# Patient Record
Sex: FEMALE | Race: WHITE | Employment: FULL TIME | URBAN - METROPOLITAN AREA
[De-identification: names, ages, dates, MRNs, and addresses within clinical notes are randomized per-mention and may not be internally consistent; named-entity substitution may affect disease eponyms.]

---

## 2017-12-04 RX ORDER — ASPIRIN 81 MG/1
81 TABLET ORAL EVERY EVENING
COMMUNITY

## 2017-12-04 NOTE — PROGRESS NOTES
Patient pre-assessment complete for [facility] poss with DR Thomason scheduled for 17 at 9:30am, arrival time 7:30am. Patient verified using . Patient instructed to bring all home medications in labeled bottles on the day of procedure. NPO status reinforced. Patient informed to take a full dose aspirin 325mg  or 81 mg x 4 on the day of procedure. Instructed they can take all other medications excluding vitamins & supplements. Patient verbalizes understanding of all instructions & denies any questions at this time.

## 2017-12-05 ENCOUNTER — HOSPITAL ENCOUNTER (OUTPATIENT)
Dept: CARDIAC CATH/INVASIVE PROCEDURES | Age: 47
Discharge: HOME | End: 2017-12-05
Attending: INTERNAL MEDICINE | Admitting: INTERNAL MEDICINE
Payer: COMMERCIAL

## 2017-12-05 VITALS
RESPIRATION RATE: 16 BRPM | DIASTOLIC BLOOD PRESSURE: 58 MMHG | TEMPERATURE: 98.4 F | HEART RATE: 88 BPM | OXYGEN SATURATION: 94 % | BODY MASS INDEX: 32.18 KG/M2 | HEIGHT: 67 IN | WEIGHT: 205 LBS | SYSTOLIC BLOOD PRESSURE: 105 MMHG

## 2017-12-05 LAB
ALBUMIN SERPL-MCNC: 3.4 G/DL (ref 3.5–5)
ALBUMIN/GLOB SERPL: 0.8 {RATIO} (ref 1.2–3.5)
ALP SERPL-CCNC: 91 U/L (ref 50–136)
ALT SERPL-CCNC: 37 U/L (ref 12–65)
ANION GAP SERPL CALC-SCNC: 12 MMOL/L (ref 7–16)
AST SERPL-CCNC: 22 U/L (ref 15–37)
ATRIAL RATE: 80 BPM
BILIRUB SERPL-MCNC: 0.3 MG/DL (ref 0.2–1.1)
BUN SERPL-MCNC: 22 MG/DL (ref 6–23)
CALCIUM SERPL-MCNC: 8.8 MG/DL (ref 8.3–10.4)
CALCULATED P AXIS, ECG09: 12 DEGREES
CALCULATED R AXIS, ECG10: 1 DEGREES
CALCULATED T AXIS, ECG11: 27 DEGREES
CHLORIDE SERPL-SCNC: 99 MMOL/L (ref 98–107)
CO2 SERPL-SCNC: 29 MMOL/L (ref 21–32)
CREAT SERPL-MCNC: 0.72 MG/DL (ref 0.6–1)
DIAGNOSIS, 93000: NORMAL
ERYTHROCYTE [DISTWIDTH] IN BLOOD BY AUTOMATED COUNT: 13.1 % (ref 11.9–14.6)
GLOBULIN SER CALC-MCNC: 4.2 G/DL (ref 2.3–3.5)
GLUCOSE SERPL-MCNC: 129 MG/DL (ref 65–100)
HCT VFR BLD AUTO: 44.8 % (ref 35.8–46.3)
HGB BLD-MCNC: 15.4 G/DL (ref 11.7–15.4)
INR PPP: 1 (ref 0.9–1.2)
MCH RBC QN AUTO: 29.5 PG (ref 26.1–32.9)
MCHC RBC AUTO-ENTMCNC: 34.4 G/DL (ref 31.4–35)
MCV RBC AUTO: 85.8 FL (ref 79.6–97.8)
P-R INTERVAL, ECG05: 136 MS
PLATELET # BLD AUTO: 249 K/UL (ref 150–450)
PMV BLD AUTO: 11.9 FL (ref 10.8–14.1)
POTASSIUM SERPL-SCNC: 3.4 MMOL/L (ref 3.5–5.1)
PROT SERPL-MCNC: 7.6 G/DL (ref 6.3–8.2)
PROTHROMBIN TIME: 10.9 SEC (ref 9.6–12)
Q-T INTERVAL, ECG07: 392 MS
QRS DURATION, ECG06: 100 MS
QTC CALCULATION (BEZET), ECG08: 452 MS
RBC # BLD AUTO: 5.22 M/UL (ref 4.05–5.25)
SODIUM SERPL-SCNC: 140 MMOL/L (ref 136–145)
VENTRICULAR RATE, ECG03: 80 BPM
WBC # BLD AUTO: 12.3 K/UL (ref 4.3–11.1)

## 2017-12-05 PROCEDURE — 99153 MOD SED SAME PHYS/QHP EA: CPT

## 2017-12-05 PROCEDURE — 85610 PROTHROMBIN TIME: CPT | Performed by: INTERNAL MEDICINE

## 2017-12-05 PROCEDURE — 93458 L HRT ARTERY/VENTRICLE ANGIO: CPT

## 2017-12-05 PROCEDURE — 74011250636 HC RX REV CODE- 250/636: Performed by: INTERNAL MEDICINE

## 2017-12-05 PROCEDURE — C1769 GUIDE WIRE: HCPCS

## 2017-12-05 PROCEDURE — 92920 PRQ TRLUML C ANGIOP 1ART&/BR: CPT

## 2017-12-05 PROCEDURE — 77030012468 HC VLV BLEEDBK CNTRL ABBT -B

## 2017-12-05 PROCEDURE — C1725 CATH, TRANSLUMIN NON-LASER: HCPCS

## 2017-12-05 PROCEDURE — 80053 COMPREHEN METABOLIC PANEL: CPT | Performed by: INTERNAL MEDICINE

## 2017-12-05 PROCEDURE — 77030002888 HC SUT CHRMC J&J -A

## 2017-12-05 PROCEDURE — 74011250637 HC RX REV CODE- 250/637: Performed by: INTERNAL MEDICINE

## 2017-12-05 PROCEDURE — 77030004534 HC CATH ANGI DX INFN CARD -A

## 2017-12-05 PROCEDURE — 74011250636 HC RX REV CODE- 250/636

## 2017-12-05 PROCEDURE — C1894 INTRO/SHEATH, NON-LASER: HCPCS

## 2017-12-05 PROCEDURE — 99152 MOD SED SAME PHYS/QHP 5/>YRS: CPT

## 2017-12-05 PROCEDURE — 74011636320 HC RX REV CODE- 636/320: Performed by: INTERNAL MEDICINE

## 2017-12-05 PROCEDURE — 82810 BLOOD GASES O2 SAT ONLY: CPT

## 2017-12-05 PROCEDURE — C1887 CATHETER, GUIDING: HCPCS

## 2017-12-05 PROCEDURE — 74011000258 HC RX REV CODE- 258: Performed by: INTERNAL MEDICINE

## 2017-12-05 PROCEDURE — 77030013794 HC KT TRNSDUC BLD EDWD -B

## 2017-12-05 PROCEDURE — 85027 COMPLETE CBC AUTOMATED: CPT | Performed by: INTERNAL MEDICINE

## 2017-12-05 PROCEDURE — 74011000250 HC RX REV CODE- 250: Performed by: INTERNAL MEDICINE

## 2017-12-05 PROCEDURE — 93005 ELECTROCARDIOGRAM TRACING: CPT | Performed by: INTERNAL MEDICINE

## 2017-12-05 RX ORDER — FENTANYL CITRATE 50 UG/ML
25-50 INJECTION, SOLUTION INTRAMUSCULAR; INTRAVENOUS
Status: DISCONTINUED | OUTPATIENT
Start: 2017-12-05 | End: 2017-12-05 | Stop reason: HOSPADM

## 2017-12-05 RX ORDER — HEPARIN SODIUM 200 [USP'U]/100ML
3 INJECTION, SOLUTION INTRAVENOUS CONTINUOUS
Status: DISCONTINUED | OUTPATIENT
Start: 2017-12-05 | End: 2017-12-05 | Stop reason: HOSPADM

## 2017-12-05 RX ORDER — ONDANSETRON 2 MG/ML
4 INJECTION INTRAMUSCULAR; INTRAVENOUS AS NEEDED
Status: DISCONTINUED | OUTPATIENT
Start: 2017-12-05 | End: 2017-12-05 | Stop reason: HOSPADM

## 2017-12-05 RX ORDER — DIAZEPAM 5 MG/1
5 TABLET ORAL ONCE
Status: COMPLETED | OUTPATIENT
Start: 2017-12-05 | End: 2017-12-05

## 2017-12-05 RX ORDER — PRAVASTATIN SODIUM 40 MG/1
40 TABLET ORAL
Qty: 30 TAB | Refills: 11 | Status: SHIPPED | OUTPATIENT
Start: 2017-12-05 | End: 2018-01-15 | Stop reason: SDUPTHER

## 2017-12-05 RX ORDER — SODIUM CHLORIDE 9 MG/ML
75 INJECTION, SOLUTION INTRAVENOUS CONTINUOUS
Status: DISCONTINUED | OUTPATIENT
Start: 2017-12-05 | End: 2017-12-05 | Stop reason: HOSPADM

## 2017-12-05 RX ORDER — MIDAZOLAM HYDROCHLORIDE 1 MG/ML
.5-2 INJECTION, SOLUTION INTRAMUSCULAR; INTRAVENOUS
Status: DISCONTINUED | OUTPATIENT
Start: 2017-12-05 | End: 2017-12-05 | Stop reason: HOSPADM

## 2017-12-05 RX ORDER — GUAIFENESIN 100 MG/5ML
81-324 LIQUID (ML) ORAL ONCE
Status: DISCONTINUED | OUTPATIENT
Start: 2017-12-05 | End: 2017-12-05 | Stop reason: HOSPADM

## 2017-12-05 RX ORDER — LIDOCAINE HYDROCHLORIDE 20 MG/ML
60 INJECTION, SOLUTION INFILTRATION; PERINEURAL ONCE
Status: COMPLETED | OUTPATIENT
Start: 2017-12-05 | End: 2017-12-05

## 2017-12-05 RX ADMIN — LIDOCAINE HYDROCHLORIDE 60 MG: 20 INJECTION, SOLUTION INFILTRATION; PERINEURAL at 10:20

## 2017-12-05 RX ADMIN — SODIUM CHLORIDE 75 ML/HR: 900 INJECTION, SOLUTION INTRAVENOUS at 08:33

## 2017-12-05 RX ADMIN — FENTANYL CITRATE 50 MCG: 50 INJECTION, SOLUTION INTRAMUSCULAR; INTRAVENOUS at 10:02

## 2017-12-05 RX ADMIN — FENTANYL CITRATE 50 MCG: 50 INJECTION, SOLUTION INTRAMUSCULAR; INTRAVENOUS at 10:32

## 2017-12-05 RX ADMIN — DIAZEPAM 5 MG: 5 TABLET ORAL at 08:33

## 2017-12-05 RX ADMIN — MIDAZOLAM HYDROCHLORIDE 2 MG: 1 INJECTION, SOLUTION INTRAMUSCULAR; INTRAVENOUS at 10:02

## 2017-12-05 RX ADMIN — MIDAZOLAM HYDROCHLORIDE 2 MG: 1 INJECTION, SOLUTION INTRAMUSCULAR; INTRAVENOUS at 10:31

## 2017-12-05 RX ADMIN — ONDANSETRON 4 MG: 2 INJECTION INTRAMUSCULAR; INTRAVENOUS at 09:08

## 2017-12-05 RX ADMIN — BIVALIRUDIN 1.75 MG/KG/HR: 250 INJECTION, POWDER, LYOPHILIZED, FOR SOLUTION INTRAVENOUS at 10:30

## 2017-12-05 RX ADMIN — HEPARIN SODIUM 3 ML/HR: 200 INJECTION, SOLUTION INTRAVENOUS at 10:15

## 2017-12-05 RX ADMIN — IOPAMIDOL 140 ML: 755 INJECTION, SOLUTION INTRAVENOUS at 10:51

## 2017-12-05 NOTE — PROCEDURES
lv fxn ok  Mild prox lad disease continued distal lad occlusion   circ ok  rca 30-40% pda lesion  Unable to cross  of lad

## 2017-12-05 NOTE — IP AVS SNAPSHOT
Anabelle Choi 
 
 
 [address] [facility] [address] [facility] 
[phone] Patient: Lamine Badillo MRN: [id number] AMA:[birth year] Discharge Summary 12/5/2017 Lamine Badillo MRN[de-identified]  [id number] Admission Information Provider Pager Service Admission Date Expected D/C Date Rio Ibarra MD  CARDIAC CATH LAB 12/5/2017 12/5/2017 Actual LOS Patient Class 0 days OUTPATIENT Follow-up Information Follow up With Details Comments Contact Info Omari Harding MD   [address] [facility] [address] [facility] [address] 
[phone] Rio Ibarra MD On 12/19/2017 Follow up on December 19th at 3:15pm [facility])  [address] [facility] [address] 
[phone] My Medications TAKE these medications as instructed Instructions Each Dose to Equal  
 Morning Noon Evening Bedtime  
 albuterol 2.5 mg /3 mL (0.083 %) nebulizer solution Commonly known as:  PROVENTIL VENTOLIN Your last dose was: Your next dose is:    
   
   
 1 vial via nebulizer qid  
     
   
   
   
  
 aspirin delayed-release 81 mg tablet Your last dose was: Your next dose is: Take 81 mg by mouth every evening. 81 mg  
    
   
   
   
  
 ATIVAN 0.5 mg tablet Generic drug:  LORazepam  
   
Your last dose was: Your next dose is: Take 0.5 mg by mouth every four (4) hours as needed for Anxiety. 0.5 mg  
    
   
   
   
  
 chlorthalidone 25 mg tablet Commonly known as:  Vonzella Barrera Your last dose was: Your next dose is: Take 25 mg by mouth every evening. Indications: hypertension 25 mg  
    
   
   
   
  
 DIOVAN 160 mg tablet Generic drug:  valsartan Your last dose was: Your next dose is: Take 160 mg by mouth every evening.  Indications: HYPERTENSION  
 160 mg  
    
   
   
   
  
 * EFFEXOR  mg capsule Generic drug:  venlafaxine-SR Your last dose was: Your next dose is: Take 150 mg by mouth every evening. Takes with 37.5 mg nightlly  Indications: Generalized Anxiety Disorder 150 mg  
    
   
   
   
  
 * venlafaxine-SR 37.5 mg capsule Commonly known as:  EFFEXOR-XR Your last dose was: Your next dose is: Take 37.5 mg by mouth every evening. With the 150mg 37.5 mg  
    
   
   
   
  
 FLONASE 50 mcg/actuation nasal spray Generic drug:  fluticasone Your last dose was: Your next dose is:    
   
   
 2 sprays by Both Nostrils route as needed for Rhinitis. 2 Spray  
    
   
   
   
  
 fluticasone-vilanterol 100-25 mcg/dose inhaler Commonly known as:  AVIS ELLIPTA Your last dose was: Your next dose is:    
   
   
 1 inhalation daily, rinse mouth after use  
     
   
   
   
  
 glimepiride 2 mg tablet Commonly known as:  AMARYL Your last dose was: Your next dose is: Take 4 mg by mouth nightly. 4 mg  
    
   
   
   
  
 levalbuterol tartrate 45 mcg/actuation inhaler Commonly known as:  Shea Wise Your last dose was: Your next dose is: Take 2 Puffs by inhalation every six (6) hours as needed for Wheezing. 2 Puff  
    
   
   
   
  
 nebivolol 10 mg tablet Commonly known as:  BYSTOLIC Your last dose was: Your next dose is: Take 1 Tab by mouth daily. 10 mg  
    
   
   
   
  
 pravastatin 40 mg tablet Commonly known as:  PRAVACHOL Your last dose was: Your next dose is: Take 1 Tab by mouth nightly. 40 mg  
    
   
   
   
  
 ranolazine  mg SR tablet Commonly known as:  RANEXA Your last dose was: Your next dose is: Take 1 Tab by mouth two (2) times a day. 500 mg  
    
   
   
   
  
 ZANTAC 150 mg tablet Generic drug:  raNITIdine Your last dose was: Your next dose is: Take 150 mg by mouth daily as needed. 150 mg  
    
   
   
   
  
 * Notice: This list has 2 medication(s) that are the same as other medications prescribed for you. Read the directions carefully, and ask your doctor or other care provider to review them with you. Where to Get Your Medications Information on where to get these meds will be given to you by the nurse or doctor. ! Ask your nurse or doctor about these medications  
  pravastatin 40 mg tablet General Information Please provide this summary of care documentation to your next provider. Allergies Unspecified:  Keflex [Cephalexin]; Stadol [Butorphanol Tartrate]; Ultram [Tramadol] Low:  Pcn [Penicillins] Current Immunizations  Reviewed on 12/8/2016 Name Date Influenza Vaccine 10/1/2016 Influenza Vaccine Tyshawn Schwarz) 10/4/2016 Pneumococcal Vaccine (Unspecified Type) 10/1/2011 Discharge Instructions Discharge Instructions HEART CATHETERIZATION/ANGIOGRAPHY DISCHARGE INSTRUCTIONS 1. Check puncture site frequently for swelling or bleeding. If there is any bleeding, lie down and apply pressure over the area with a clean towel or washcloth and call 911. Notify your doctor for any redness, swelling, drainage, or oozing from the puncture site. Notify your doctor for any fever or chills. 2. If the extremity becomes cold, numb, or painful call [facility] at [phone]. 
3. Activity should be limited for the next 48 hours. Climb stairs as little as possible and avoid any stooping, bending, or strenuous activity for 48 hours. No heavy lifting (anything over 10 pounds) for 3 days. 4. You may resume your usual diet.  Drink more fluids than usual. 
5. Have a responsible person drive you home and stay with you for at least 24 hours after your heart catheterization/angiography. 6. You may remove bandage from your Right groin in 24 hours. You may shower in 24 hours. No tub baths, hot tubs, or swimming for 1 week. Do not place any lotions, creams, powders, or ointments over puncture site for 1 week. You may place a clean band-aid over the puncture site each day for 5 days. Change daily. I have read the above instructions and have had the opportunity to ask questions. Patient: ________________________   Date: 12/5/2017 Witness: _______________________   Date: 12/5/2017 Discharge Orders None  
  
` Patient Signature:  ____________________________________________________________ Date:  ____________________________________________________________  
  
 Whit Artist Provider Signature:  ____________________________________________________________ Date:  ____________________________________________________________

## 2017-12-05 NOTE — DISCHARGE INSTRUCTIONS
HEART CATHETERIZATION/ANGIOGRAPHY DISCHARGE INSTRUCTIONS    1. Check puncture site frequently for swelling or bleeding. If there is any bleeding, lie down and apply pressure over the area with a clean towel or washcloth and call 911. Notify your doctor for any redness, swelling, drainage, or oozing from the puncture site. Notify your doctor for any fever or chills. 2. If the extremity becomes cold, numb, or painful call [address] Cardiology at [phone].  3. Activity should be limited for the next 48 hours. Climb stairs as little as possible and avoid any stooping, bending, or strenuous activity for 48 hours. No heavy lifting (anything over 10 pounds) for 3 days. 4. You may resume your usual diet. Drink more fluids than usual.  5. Have a responsible person drive you home and stay with you for at least 24 hours after your heart catheterization/angiography. 6. You may remove bandage from your Right groin in 24 hours. You may shower in 24 hours. No tub baths, hot tubs, or swimming for 1 week. Do not place any lotions, creams, powders, or ointments over puncture site for 1 week. You may place a clean band-aid over the puncture site each day for 5 days. Change daily. I have read the above instructions and have had the opportunity to ask questions.       Patient: ________________________   Date: 12/5/2017    Witness: _______________________   Date: 12/5/2017

## 2017-12-05 NOTE — PROGRESS NOTES
6FR sheath removed from 20 minutes. Manual pressure held for RFA until hemostasis achieved. No bleeding or hematoma noted afterwards. Sterile dressing placed. Pt instructed to keep right leg straight and to remain flat. Pt verbalized understanding of post procedural instructions. Call bell within reach. Will continue to monitor. Hemostasis achieved at 1316.

## 2017-12-05 NOTE — PROGRESS NOTES
Pt arrived, ambulated to room with no visible problems, planned LHC for Dr Ortiz Grover. Consent signed, Procedure discussed with pt all questions answered voiced understanding. Medications and history discussed with pt.     Pt prepped per orders

## 2017-12-05 NOTE — DISCHARGE SUMMARY
[facility] Discharge Summary     Patient ID:  Clotilde Hernandez  [id number]  42 y.o.  1970    Admit date: 12/5/2017    Discharge date:  12/5/2017    Admitting Physician: Kimberly Webster MD     Discharge Physician: AKILAH Alva/Dr. Diamond Vásquez    Admission Diagnoses: Chest pain [R07.9]    Discharge Diagnoses:   Patient Active Problem List    Diagnosis Date Noted    RONNIE (obstructive sleep apnea) 10/04/2016    Obesity (BMI 30-39.9) 10/04/2016    Essential hypertension with goal blood pressure less than 140/90 05/17/2016    Dyslipidemia 05/17/2016    Abnormal stress test 05/17/2016    Type 2 diabetes mellitus without complication ([facility] [address] 90/26/3871    Chest pressure 04/15/2016    Diabetes mellitus type 2, controlled ([facility] [address] 04/15/2016       Cardiology Procedures this admission:  Diagnostic left heart catheterization  Consults: None    Hospital Course: Patient was seen at the office of [facility] by Dr. Diamond Vásquez for complaints of exertional chest pain and pressure with associated dyspnea. She was subsequently scheduled for a LHC at [facility] on 12/5/17. Patient underwent cardiac catheterization by Dr. Diamond Vásquez. Patient was found to have an occluded LAD that could not be crossed. The PDA had a 30-40% stenosis. The LCx did not have any high grade obstructions. Continued medical therapy was recommended. She was monitored post procedure. She was feeling well in the afternoon of 12/5/17 and was determined stable and ready for discharge. For maximized medical therapy for CAD, patient will continue ASA, BB, ARB and Ranexa. She is started on a statin as well. She is reluctant to try a statin and she is concerned that it will add to her chronic pain. The importance of medical management of CAD was discussed at length. She is willing to try pravastatin. The patient will follow up with [facility] Dr. Diamond Vásquez on December 19th at 3:15pm [facility].      DISPOSITION: The patient is being discharged home in stable condition on a low saturated fat, low cholesterol and low salt diet. The patient is instructed to advance activities as tolerated to the limit of fatigue or shortness of breath. The patient is instructed to avoid all heavy lifting, straining, stooping or squatting for 3-5 days. The patient is instructed to watch the cath site for bleeding/oozing; if seen, the patient is instructed to apply firm pressure with a clean cloth and call [facility] at [phone]. The patient is instructed to watch for signs of infection which include: increasing area of redness, fever/hot to touch or purulent drainage at the catheterization site. The patient is instructed not to soak in a bathtub for 7-10 days, but is cleared to shower. The patient is instructed to call the office or return to the ER for immediate evaluation for any shortness of breath or chest pain not relieved by NTG.       Discharge Exam:   Visit Vitals    /78    Pulse 76    Temp 98.4 °F (36.9 °C)    Resp 17    Ht 5' 7\" (1.702 m)    Wt 93 kg (205 lb)    SpO2 94%    Breastfeeding No    BMI 32.11 kg/m2       Physical Exam:  General: Well Developed, Obese, No Acute Distress, Alert & Oriented  Neck: supple, no JVD  Heart: S1S2 with RRR  Lungs: Clear throughout auscultation bilaterally  Abd: soft, nontender, nondistended, with good bowel sounds  Ext: warm, no edema, calves supple/nontender, pulses 2+ bilaterally  Skin: warm and dry      Recent Results (from the past 24 hour(s))   CBC W/O DIFF    Collection Time: 12/05/17  8:24 AM   Result Value Ref Range    WBC 12.3 (H) 4.3 - 11.1 K/uL    RBC 5.22 4.05 - 5.25 M/uL    HGB 15.4 11.7 - 15.4 g/dL    HCT 44.8 35.8 - 46.3 %    MCV 85.8 79.6 - 97.8 FL    MCH 29.5 26.1 - 32.9 PG    MCHC 34.4 31.4 - 35.0 g/dL    RDW 13.1 11.9 - 14.6 %    PLATELET 908 626 - 829 K/uL    MPV 11.9 10.8 - 17.3 FL   METABOLIC PANEL, COMPREHENSIVE    Collection Time: 12/05/17  8:24 AM   Result Value Ref Range    Sodium 140 136 - 145 mmol/L    Potassium 3.4 (L) 3.5 - 5.1 mmol/L    Chloride 99 98 - 107 mmol/L    CO2 29 21 - 32 mmol/L    Anion gap 12 7 - 16 mmol/L    Glucose 129 (H) 65 - 100 mg/dL    BUN 22 6 - 23 MG/DL    Creatinine 0.72 0.6 - 1.0 MG/DL    GFR est AA >60 >60 ml/min/1.73m2    GFR est non-AA >60 >60 ml/min/1.73m2    Calcium 8.8 8.3 - 10.4 MG/DL    Bilirubin, total 0.3 0.2 - 1.1 MG/DL    ALT (SGPT) 37 12 - 65 U/L    AST (SGOT) 22 15 - 37 U/L    Alk. phosphatase 91 50 - 136 U/L    Protein, total 7.6 6.3 - 8.2 g/dL    Albumin 3.4 (L) 3.5 - 5.0 g/dL    Globulin 4.2 (H) 2.3 - 3.5 g/dL    A-G Ratio 0.8 (L) 1.2 - 3.5     PROTHROMBIN TIME + INR    Collection Time: 12/05/17  8:24 AM   Result Value Ref Range    Prothrombin time 10.9 9.6 - 12.0 sec    INR 1.0 0.9 - 1.2     EKG, 12 LEAD, INITIAL    Collection Time: 12/05/17  8:28 AM   Result Value Ref Range    Ventricular Rate 80 BPM    Atrial Rate 80 BPM    P-R Interval 136 ms    QRS Duration 100 ms    Q-T Interval 392 ms    QTC Calculation (Bezet) 452 ms    Calculated P Axis 12 degrees    Calculated R Axis 1 degrees    Calculated T Axis 27 degrees    Diagnosis       Normal sinus rhythm  Normal ECG  When compared with ECG of 19-MAY-2016 12:08,  No significant change was found  Confirmed by Radhika Urena MD (), JOJO BEAVERS ([id number]) on 12/5/2017 11:12:40 AM           Patient Instructions:   Current Discharge Medication List      START taking these medications    Details   pravastatin (PRAVACHOL) 40 mg tablet Take 1 Tab by mouth nightly. Qty: 30 Tab, Refills: 11         CONTINUE these medications which have NOT CHANGED    Details   aspirin delayed-release 81 mg tablet Take 81 mg by mouth every evening. venlafaxine-SR (EFFEXOR-XR) 37.5 mg capsule Take 37.5 mg by mouth every evening. With the 150mg   Refills: 5      nebivolol (BYSTOLIC) 10 mg tablet Take 1 Tab by mouth daily.   Qty: 90 Tab, Refills: 3      fluticasone-vilanterol (BREO ELLIPTA) 100-25 mcg/dose inhaler 1 inhalation daily, rinse mouth after use  Qty: 1 Inhaler, Refills: 11      ranolazine ER (RANEXA) 500 mg SR tablet Take 1 Tab by mouth two (2) times a day. Qty: 180 Tab, Refills: 3      glimepiride (AMARYL) 2 mg tablet Take 4 mg by mouth nightly. chlorthalidone (HYGROTEN) 25 mg tablet Take 25 mg by mouth every evening. Indications: hypertension       venlafaxine-SR (EFFEXOR XR) 150 mg capsule Take 150 mg by mouth every evening. Takes with 37.5 mg nightlly  Indications: Generalized Anxiety Disorder      valsartan (DIOVAN) 160 mg tablet Take 160 mg by mouth every evening. Indications: HYPERTENSION      raNITIdine (ZANTAC) 150 mg tablet Take 150 mg by mouth daily as needed. albuterol (PROVENTIL VENTOLIN) 2.5 mg /3 mL (0.083 %) nebulizer solution 1 vial via nebulizer qid  Qty: 120 Each, Refills: 11      levalbuterol tartrate (XOPENEX HFA) 45 mcg/actuation inhaler Take 2 Puffs by inhalation every six (6) hours as needed for Wheezing. Qty: 1 Inhaler, Refills: 5      LORazepam (ATIVAN) 0.5 mg tablet Take 0.5 mg by mouth every four (4) hours as needed for Anxiety. fluticasone (FLONASE) 50 mcg/actuation nasal spray 2 sprays by Both Nostrils route as needed for Rhinitis.                   Signed:  Faina Fortune PA-C  12/5/2017  1:11 PM

## 2017-12-05 NOTE — PROGRESS NOTES
Pt arrived with open wound on right arm. Pt had carpal tunnel surgery 2 weeks ago. Covered with telfa and coban.
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
## 2017-12-05 NOTE — PROGRESS NOTES
TRANSFER - OUT REPORT:    Verbal report given to Romina(name) on Colleen Kuhn  being transferred to cpru(unit) for routine progression of care       Report consisted of patients Situation, Background, Assessment and   Recommendations(SBAR). Information from the following report(s) SBAR was reviewed with the receiving nurse. Opportunity for questions and clarification was provided. Dr. Cleotilde Mcburney  Procedure: [facility]   Finding Summary: balloon angioplasty to LAD(cath/pci/pacer settings)  Location: Rgroin    Closure Device: 6F sheath/art line in place(yes/no/description)  Post Site Assessment: Tegaderm dry and intact. No bleeding/no hematoma noted. Intra Procedure Meds:    Versed: 4mg  Fentanyl: 100mcg  Angiomax: 14ml bolus/33ml/hr infusion  Angiomax Stop Time: 6688  MD Thomason advises no additional anti-platelets, states pt. Will continue ASA therapy. Peripheral IV 12/05/17 Right Forearm (Active)       Peripheral IV 12/05/17 Left Antecubital (Active)     Sheath 12/05/17 (Active)   Site Assessment Clean, dry, & intact 12/5/2017 10:52 AM   Dressing Status Clean, dry, & intact 12/5/2017 10:52 AM   Dressing Type Transparent 12/5/2017 10:52 AM   Hub Color/Line Status Green 12/5/2017 10:52 AM     Post-Procedure Site Assessment (1)  Wound Type: Catheter entry/exit  Location: Groin  Orientation : Right  Site Assessment: Transparent                       is allergic to keflex [cephalexin]; stadol [butorphanol tartrate]; ultram [tramadol]; and pcn [penicillins].     Past Medical History:   Diagnosis Date    Asthma     seasonal    CAD (coronary artery disease)     Depression with anxiety     Diabetes (HCC)     checks QD, last A1c 6.5, hyposymptoms at 80 , normal 100    Difficult intubation     during hysterectomy pt states small throat , request glidescope    GERD (gastroesophageal reflux disease)     Hypertension     Nausea & vomiting     Obesity (BMI 30-39.9) 34    Panic attacks     Prediabetes     bs: 140's. diet controlled    Psychiatric disorder     ANXIETY    Thromboembolus ([facility] [address]     2014- ? ? Pulm Embolism after hysterectomy    Unspecified adverse effect of anesthesia     \"my heart stopped and quit breathing after hysterectomy on the way to PACU 11/7/14    Unspecified sleep apnea     uses cpap.       Visit Vitals    /64    Pulse 88    Temp 98.4 °F (36.9 °C)    Resp 16    Ht 5' 7\" (1.702 m)    Wt 93 kg (205 lb)    SpO2 99%    Breastfeeding No    BMI 32.11 kg/m2

## 2017-12-05 NOTE — PROGRESS NOTES
Patient up to bedside, vital signs and site stable. Patient ambulated to bathroom without difficulty. Patient voided without difficulty. Vascular site stable. 1623 Discharge instructions and home medications reviewed with patient. Time allowed for questions and answers. 1627 Patient ambulated second time without difficulty. Site stable after ambulation. Peripheral IV sites dc'd without difficulty with tips intact. 1645 Patient discharged to home with family.

## 2017-12-05 NOTE — PROGRESS NOTES
Report received from [address] [facility] [id number]. Procedural findings communicated. Intra procedural  medication administration reviewed. Progression of care discussed.      Patient received into [id number] [facility] 7 : 6fr sheath to RFA connected to art line    Access site without bleeding or swelling yes    Dressing dry and intact yes    Patient instructed to limit movement to right lower extremity    Routine post procedural vital signs and site assessment initiated yes

## 2017-12-05 NOTE — PROCEDURES
Xenia Ball 43       Name:  Malina Gonzalez   MR#:  [id number]   :  1970   Account #:  [de-identified]   Date of Adm:  2017       PROCEDURE:   1. Left heart catheterization. 2. Selective coronary angiography. 3. Left ventriculogram with balloon angioplasty of an occluded   LAD. COMPLICATIONS: None. INDICATIONS: Angina. TIME: 10:03 to 10:51    SEDATION: 4 mg of Versed and 100 mcg of fentanyl were given by   [employer]. HEMODYNAMICS: Aortic pressure 120/97. LVEDP 41. CONTRAST: 140 mL. ACCESS: Right femoral access was used. Ivonne left 4, Ivonne   right 4, straight pigtail were used for diagnostics. Attempted   intervention was done with a Q4 guide, Prowater wire and a 2.0   x 12 balloon. FINDINGS:   Left ventriculogram done in GRIJALVA projection shows EF 65% with   minor apical hypokinesis. No gradient on pullback. The left main arises normally, bifurcates into an LAD and   circumflex. The left main has no significant disease. The LAD courses towards the apex. The proximal and mid LAD have   some diffuse mild disease. Diagonal has diffuse mild disease. At   the beginning of the terminal portion terminal 1/3 of the LAD   there is a total occlusion. This has been present in the past.   There is no antegrade or retrograde collaterals to the LAD. The circumflex artery in the AV groove is a large artery. It is   nondominant and courses as a large OM to the apex. There is   essentially no disease in the circumflex. The right coronary artery is a large, dominant artery with   diffuse minimal irregularity in the RCA proper. There is a 30-  40% lesion in the proximal PDA. Posterolateral has no   significant disease. Posterolateral and PDA are large vessels. PDA extends to the apex. The patient continues to have this chronically occluded LAD with   no collateral filling in either the antegrade or retrograde   position.  At this juncture though it is felt that an attempts at   crossing the  would be warranted. The patient was then   anticoagulated and a [employer] wire was placed into the area of   the total occlusion. The wire preferentially continued to slip   into a diagonal branch. The true LAD lumen to the apex could   never be entered. It was felt that a balloon angioplasty into the   diagonal was at least warranted and that it may unmask more   collaterals to the apex. A balloon angioplasty x3 was performed   with flow then into the previously occluded diagonal, but no   flow into the LAD. CONCLUSIONS: Coronary artery disease with continued chronic   occlusion of the apical LAD, status post balloon angioplasty of   a very small diagonal branch. RECOMMENDATIONS: Continue medical therapy.         MD Danika Browne / Tal Venegas   D:  12/05/2017   11:05   T:  12/05/2017   13:52   Job #:  [id number]

## 2018-08-01 ENCOUNTER — HOSPITAL ENCOUNTER (OUTPATIENT)
Dept: LAB | Age: 48
Discharge: HOME | End: 2018-08-01
Payer: COMMERCIAL

## 2018-08-01 DIAGNOSIS — E78.5 DYSLIPIDEMIA: ICD-10-CM

## 2018-08-01 DIAGNOSIS — I25.10 ATHEROSCLEROSIS OF NATIVE CORONARY ARTERY OF NATIVE HEART WITHOUT ANGINA PECTORIS: ICD-10-CM

## 2018-08-01 DIAGNOSIS — E11.65 CONTROLLED TYPE 2 DIABETES MELLITUS WITH HYPERGLYCEMIA, WITHOUT LONG-TERM CURRENT USE OF INSULIN (HCC): ICD-10-CM

## 2018-08-01 DIAGNOSIS — I10 ESSENTIAL HYPERTENSION WITH GOAL BLOOD PRESSURE LESS THAN 140/90: ICD-10-CM

## 2018-08-01 PROCEDURE — 82306 VITAMIN D 25 HYDROXY: CPT | Performed by: INTERNAL MEDICINE

## 2018-08-01 PROCEDURE — 36415 COLL VENOUS BLD VENIPUNCTURE: CPT | Performed by: INTERNAL MEDICINE

## 2018-08-02 LAB — 25(OH)D3+25(OH)D2 SERPL-MCNC: 38.4 NG/ML (ref 30–100)

## 2018-11-08 ENCOUNTER — APPOINTMENT (RX ONLY)
Dept: URBAN - METROPOLITAN AREA CLINIC 24 | Facility: CLINIC | Age: 48
Setting detail: DERMATOLOGY
End: 2018-11-08

## 2018-11-08 DIAGNOSIS — D49.2 NEOPLASM OF UNSPECIFIED BEHAVIOR OF BONE, SOFT TISSUE, AND SKIN: ICD-10-CM

## 2018-11-08 DIAGNOSIS — Z71.89 OTHER SPECIFIED COUNSELING: ICD-10-CM

## 2018-11-08 DIAGNOSIS — L91.8 OTHER HYPERTROPHIC DISORDERS OF THE SKIN: ICD-10-CM

## 2018-11-08 DIAGNOSIS — D22 MELANOCYTIC NEVI: ICD-10-CM

## 2018-11-08 DIAGNOSIS — L82.1 OTHER SEBORRHEIC KERATOSIS: ICD-10-CM

## 2018-11-08 DIAGNOSIS — L81.4 OTHER MELANIN HYPERPIGMENTATION: ICD-10-CM

## 2018-11-08 PROBLEM — M12.9 ARTHROPATHY, UNSPECIFIED: Status: ACTIVE | Noted: 2018-11-08

## 2018-11-08 PROBLEM — L55.1 SUNBURN OF SECOND DEGREE: Status: ACTIVE | Noted: 2018-11-08

## 2018-11-08 PROBLEM — L85.3 XEROSIS CUTIS: Status: ACTIVE | Noted: 2018-11-08

## 2018-11-08 PROBLEM — E78.5 HYPERLIPIDEMIA, UNSPECIFIED: Status: ACTIVE | Noted: 2018-11-08

## 2018-11-08 PROBLEM — F32.9 MAJOR DEPRESSIVE DISORDER, SINGLE EPISODE, UNSPECIFIED: Status: ACTIVE | Noted: 2018-11-08

## 2018-11-08 PROBLEM — D22.5 MELANOCYTIC NEVI OF TRUNK: Status: ACTIVE | Noted: 2018-11-08

## 2018-11-08 PROBLEM — J45.909 UNSPECIFIED ASTHMA, UNCOMPLICATED: Status: ACTIVE | Noted: 2018-11-08

## 2018-11-08 PROBLEM — I25.10 ATHEROSCLEROTIC HEART DISEASE OF NATIVE CORONARY ARTERY WITHOUT ANGINA PECTORIS: Status: ACTIVE | Noted: 2018-11-08

## 2018-11-08 PROBLEM — F41.9 ANXIETY DISORDER, UNSPECIFIED: Status: ACTIVE | Noted: 2018-11-08

## 2018-11-08 PROBLEM — E13.9 OTHER SPECIFIED DIABETES MELLITUS WITHOUT COMPLICATIONS: Status: ACTIVE | Noted: 2018-11-08

## 2018-11-08 PROBLEM — I10 ESSENTIAL (PRIMARY) HYPERTENSION: Status: ACTIVE | Noted: 2018-11-08

## 2018-11-08 PROCEDURE — 11100: CPT

## 2018-11-08 PROCEDURE — 99203 OFFICE O/P NEW LOW 30 MIN: CPT | Mod: 25

## 2018-11-08 PROCEDURE — ? BIOPSY BY SHAVE METHOD

## 2018-11-08 PROCEDURE — ? COUNSELING

## 2018-11-08 ASSESSMENT — LOCATION SIMPLE DESCRIPTION DERM
LOCATION SIMPLE: RIGHT UPPER BACK
LOCATION SIMPLE: LEFT UPPER BACK
LOCATION SIMPLE: LEFT ANTERIOR NECK
LOCATION SIMPLE: RIGHT SHOULDER
LOCATION SIMPLE: RIGHT WRIST
LOCATION SIMPLE: LEFT PRETIBIAL REGION
LOCATION SIMPLE: UPPER BACK
LOCATION SIMPLE: RIGHT FOREARM

## 2018-11-08 ASSESSMENT — LOCATION DETAILED DESCRIPTION DERM
LOCATION DETAILED: INFERIOR THORACIC SPINE
LOCATION DETAILED: LEFT PROXIMAL PRETIBIAL REGION
LOCATION DETAILED: LEFT INFERIOR LATERAL NECK
LOCATION DETAILED: RIGHT INFERIOR MEDIAL UPPER BACK
LOCATION DETAILED: RIGHT POSTERIOR SHOULDER
LOCATION DETAILED: RIGHT VENTRAL PROXIMAL FOREARM
LOCATION DETAILED: RIGHT VENTRAL WRIST
LOCATION DETAILED: RIGHT SUPERIOR UPPER BACK
LOCATION DETAILED: LEFT SUPERIOR UPPER BACK

## 2018-11-08 ASSESSMENT — LOCATION ZONE DERM
LOCATION ZONE: LEG
LOCATION ZONE: NECK
LOCATION ZONE: ARM
LOCATION ZONE: TRUNK

## 2018-11-08 NOTE — PROCEDURE: BIOPSY BY SHAVE METHOD
Detail Level: Detailed
Bill 26322 For Specimen Handling/Conveyance To Laboratory?: no
Was A Bandage Applied: Yes
Biopsy Method: Personna blade
Wound Care: Vaseline
Dressing: bandage
Electrodesiccation And Curettage Text: The wound bed was treated with electrodesiccation and curettage after the biopsy was performed.
Curettage Text: The wound bed was treated with curettage after the biopsy was performed.
Anesthesia Type: 1% lidocaine without epinephrine and a 1:12 solution of 8.4% sodium bicarbonate
Billing Type: Third-Party Bill
Additional Anesthesia Volume In Cc (Will Not Render If 0): 0
Anesthesia Volume In Cc: 0.1
Cryotherapy Text: The wound bed was treated with cryotherapy after the biopsy was performed.
Electrodesiccation Text: The wound bed was treated with electrodesiccation after the biopsy was performed.
Silver Nitrate Text: The wound bed was treated with silver nitrate after the biopsy was performed.
Notification Instructions: Patient will be notified of biopsy results. However, patient instructed to call the office if not contacted within 2 weeks.
Post-Care Instructions: I reviewed with the patient in detail post-care instructions. Patient is to keep the biopsy site dry overnight, and then apply vaseline twice daily until healed. Patient may apply hydrogen peroxide soaks to remove any crusting. Patient given a wound care sheet.
Hemostasis: Aluminum Chloride
Depth Of Biopsy: dermis
Type Of Destruction Used: Curettage
Consent: Written consent was obtained and risks were reviewed including but not limited to scarring, infection, bleeding, scabbing, incomplete removal, nerve damage and allergy to anesthesia.
Biopsy Type: H and E

## 2019-06-30 ENCOUNTER — APPOINTMENT (OUTPATIENT)
Dept: GENERAL RADIOLOGY | Age: 49
DRG: 247 | End: 2019-06-30
Attending: EMERGENCY MEDICINE
Payer: COMMERCIAL

## 2019-06-30 ENCOUNTER — HOSPITAL ENCOUNTER (INPATIENT)
Age: 49
LOS: 2 days | Discharge: HOME | DRG: 247 | End: 2019-07-03
Attending: EMERGENCY MEDICINE | Admitting: INTERNAL MEDICINE
Payer: COMMERCIAL

## 2019-06-30 DIAGNOSIS — I21.09 ST ELEVATION MYOCARDIAL INFARCTION (STEMI) OF ANTERIOR WALL (HCC): Primary | ICD-10-CM

## 2019-06-30 PROBLEM — I21.3 STEMI (ST ELEVATION MYOCARDIAL INFARCTION) (HCC): Status: ACTIVE | Noted: 2019-06-30

## 2019-06-30 LAB
BASOPHILS # BLD: 0.1 K/UL (ref 0–0.2)
BASOPHILS NFR BLD: 1 % (ref 0–2)
DIFFERENTIAL METHOD BLD: ABNORMAL
EOSINOPHIL # BLD: 0.1 K/UL (ref 0–0.8)
EOSINOPHIL NFR BLD: 1 % (ref 0.5–7.8)
ERYTHROCYTE [DISTWIDTH] IN BLOOD BY AUTOMATED COUNT: 12.5 % (ref 11.9–14.6)
HCT VFR BLD AUTO: 49 % (ref 35.8–46.3)
HGB BLD-MCNC: 16.8 G/DL (ref 11.7–15.4)
IMM GRANULOCYTES # BLD AUTO: 0.1 K/UL (ref 0–0.5)
IMM GRANULOCYTES NFR BLD AUTO: 0 % (ref 0–5)
LYMPHOCYTES # BLD: 4.5 K/UL (ref 0.5–4.6)
LYMPHOCYTES NFR BLD: 31 % (ref 13–44)
MCH RBC QN AUTO: 28.9 PG (ref 26.1–32.9)
MCHC RBC AUTO-ENTMCNC: 34.3 G/DL (ref 31.4–35)
MCV RBC AUTO: 84.2 FL (ref 79.6–97.8)
MONOCYTES # BLD: 1.1 K/UL (ref 0.1–1.3)
MONOCYTES NFR BLD: 7 % (ref 4–12)
NEUTS SEG # BLD: 8.9 K/UL (ref 1.7–8.2)
NEUTS SEG NFR BLD: 60 % (ref 43–78)
NRBC # BLD: 0 K/UL (ref 0–0.2)
PLATELET # BLD AUTO: 240 K/UL (ref 150–450)
PMV BLD AUTO: 12.3 FL (ref 9.4–12.3)
RBC # BLD AUTO: 5.82 M/UL (ref 4.05–5.2)
WBC # BLD AUTO: 14.7 K/UL (ref 4.3–11.1)

## 2019-06-30 PROCEDURE — 74011250636 HC RX REV CODE- 250/636

## 2019-06-30 PROCEDURE — C1887 CATHETER, GUIDING: HCPCS

## 2019-06-30 PROCEDURE — 77030015766

## 2019-06-30 PROCEDURE — 84484 ASSAY OF TROPONIN QUANT: CPT

## 2019-06-30 PROCEDURE — 77030012468 HC VLV BLEEDBK CNTRL ABBT -B

## 2019-06-30 PROCEDURE — 71045 X-RAY EXAM CHEST 1 VIEW: CPT

## 2019-06-30 PROCEDURE — 74011250636 HC RX REV CODE- 250/636: Performed by: INTERNAL MEDICINE

## 2019-06-30 PROCEDURE — 83735 ASSAY OF MAGNESIUM: CPT

## 2019-06-30 PROCEDURE — C1769 GUIDE WIRE: HCPCS

## 2019-06-30 PROCEDURE — 99284 EMERGENCY DEPT VISIT MOD MDM: CPT | Performed by: EMERGENCY MEDICINE

## 2019-06-30 PROCEDURE — C1874 STENT, COATED/COV W/DEL SYS: HCPCS

## 2019-06-30 PROCEDURE — C1757 CATH, THROMBECTOMY/EMBOLECT: HCPCS

## 2019-06-30 PROCEDURE — 74011000250 HC RX REV CODE- 250: Performed by: INTERNAL MEDICINE

## 2019-06-30 PROCEDURE — 85730 THROMBOPLASTIN TIME PARTIAL: CPT

## 2019-06-30 PROCEDURE — 85025 COMPLETE CBC W/AUTO DIFF WBC: CPT

## 2019-06-30 PROCEDURE — 96374 THER/PROPH/DIAG INJ IV PUSH: CPT | Performed by: EMERGENCY MEDICINE

## 2019-06-30 PROCEDURE — 74011250637 HC RX REV CODE- 250/637: Performed by: EMERGENCY MEDICINE

## 2019-06-30 PROCEDURE — C1725 CATH, TRANSLUMIN NON-LASER: HCPCS

## 2019-06-30 PROCEDURE — 93005 ELECTROCARDIOGRAM TRACING: CPT | Performed by: INTERNAL MEDICINE

## 2019-06-30 PROCEDURE — C1894 INTRO/SHEATH, NON-LASER: HCPCS

## 2019-06-30 PROCEDURE — 85610 PROTHROMBIN TIME: CPT

## 2019-06-30 PROCEDURE — 80053 COMPREHEN METABOLIC PANEL: CPT

## 2019-06-30 PROCEDURE — 74011250636 HC RX REV CODE- 250/636: Performed by: EMERGENCY MEDICINE

## 2019-06-30 RX ORDER — GUAIFENESIN 100 MG/5ML
162 LIQUID (ML) ORAL
Status: COMPLETED | OUTPATIENT
Start: 2019-06-30 | End: 2019-06-30

## 2019-06-30 RX ORDER — HEPARIN SODIUM 5000 [USP'U]/ML
5000 INJECTION, SOLUTION INTRAVENOUS; SUBCUTANEOUS
Status: COMPLETED | OUTPATIENT
Start: 2019-06-30 | End: 2019-06-30

## 2019-06-30 RX ORDER — MORPHINE SULFATE 2 MG/ML
2 INJECTION, SOLUTION INTRAMUSCULAR; INTRAVENOUS
Status: DISCONTINUED | OUTPATIENT
Start: 2019-06-30 | End: 2019-06-30 | Stop reason: SDUPTHER

## 2019-06-30 RX ORDER — MORPHINE SULFATE 4 MG/ML
2 INJECTION INTRAVENOUS
Status: ACTIVE | OUTPATIENT
Start: 2019-06-30 | End: 2019-07-01

## 2019-06-30 RX ORDER — MORPHINE SULFATE 2 MG/ML
4 INJECTION, SOLUTION INTRAMUSCULAR; INTRAVENOUS
Status: DISCONTINUED | OUTPATIENT
Start: 2019-06-30 | End: 2019-06-30

## 2019-06-30 RX ADMIN — LIDOCAINE HYDROCHLORIDE 2 ML: 10 INJECTION, SOLUTION INFILTRATION; PERINEURAL at 23:58

## 2019-06-30 RX ADMIN — NITROGLYCERIN 1 INCH: 20 OINTMENT TOPICAL at 23:36

## 2019-06-30 RX ADMIN — HEPARIN SODIUM 2 ML: 10000 INJECTION, SOLUTION INTRAVENOUS; SUBCUTANEOUS at 23:58

## 2019-06-30 RX ADMIN — HEPARIN SODIUM 3 ML/HR: 200 INJECTION, SOLUTION INTRAVENOUS at 23:50

## 2019-06-30 RX ADMIN — ASPIRIN 81 MG 162 MG: 81 TABLET ORAL at 23:28

## 2019-06-30 RX ADMIN — FENTANYL CITRATE 50 MCG: 50 INJECTION, SOLUTION INTRAMUSCULAR; INTRAVENOUS at 23:55

## 2019-06-30 RX ADMIN — MIDAZOLAM HYDROCHLORIDE 2 MG: 1 INJECTION, SOLUTION INTRAMUSCULAR; INTRAVENOUS at 23:55

## 2019-06-30 RX ADMIN — HEPARIN SODIUM 5000 UNITS: 5000 INJECTION, SOLUTION INTRAVENOUS; SUBCUTANEOUS at 23:29

## 2019-07-01 PROBLEM — I21.19 ACUTE INFERIOR MYOCARDIAL INFARCTION (HCC): Status: ACTIVE | Noted: 2019-07-01

## 2019-07-01 LAB
ACT BLD: 186 SECS (ref 70–128)
ACT BLD: 307 SECS (ref 70–128)
ACT BLD: >1000 SECS (ref 70–128)
ALBUMIN SERPL-MCNC: 4 G/DL (ref 3.5–5)
ALBUMIN/GLOB SERPL: 1 {RATIO} (ref 1.2–3.5)
ALP SERPL-CCNC: 93 U/L (ref 50–136)
ALT SERPL-CCNC: 26 U/L (ref 12–65)
ANION GAP SERPL CALC-SCNC: 7 MMOL/L (ref 7–16)
ANION GAP SERPL CALC-SCNC: 8 MMOL/L (ref 7–16)
APTT PPP: 32.2 SEC (ref 24.7–39.8)
AST SERPL-CCNC: 26 U/L (ref 15–37)
ATRIAL RATE: 70 BPM
ATRIAL RATE: 87 BPM
ATRIAL RATE: 93 BPM
BILIRUB SERPL-MCNC: 0.4 MG/DL (ref 0.2–1.1)
BUN SERPL-MCNC: 25 MG/DL (ref 6–23)
BUN SERPL-MCNC: 30 MG/DL (ref 6–23)
CALCIUM SERPL-MCNC: 8.7 MG/DL (ref 8.3–10.4)
CALCIUM SERPL-MCNC: 9.6 MG/DL (ref 8.3–10.4)
CALCULATED P AXIS, ECG09: 33 DEGREES
CALCULATED P AXIS, ECG09: 59 DEGREES
CALCULATED P AXIS, ECG09: 68 DEGREES
CALCULATED R AXIS, ECG10: -25 DEGREES
CALCULATED R AXIS, ECG10: -27 DEGREES
CALCULATED R AXIS, ECG10: 45 DEGREES
CALCULATED T AXIS, ECG11: 14 DEGREES
CALCULATED T AXIS, ECG11: 33 DEGREES
CALCULATED T AXIS, ECG11: 78 DEGREES
CHLORIDE SERPL-SCNC: 100 MMOL/L (ref 98–107)
CHLORIDE SERPL-SCNC: 100 MMOL/L (ref 98–107)
CO2 SERPL-SCNC: 32 MMOL/L (ref 21–32)
CO2 SERPL-SCNC: 33 MMOL/L (ref 21–32)
CREAT SERPL-MCNC: 0.7 MG/DL (ref 0.6–1)
CREAT SERPL-MCNC: 0.94 MG/DL (ref 0.6–1)
DIAGNOSIS, 93000: NORMAL
EST. AVERAGE GLUCOSE BLD GHB EST-MCNC: 151 MG/DL
GLOBULIN SER CALC-MCNC: 4.2 G/DL (ref 2.3–3.5)
GLUCOSE BLD STRIP.AUTO-MCNC: 147 MG/DL (ref 65–100)
GLUCOSE BLD STRIP.AUTO-MCNC: 151 MG/DL (ref 65–100)
GLUCOSE BLD STRIP.AUTO-MCNC: 167 MG/DL (ref 65–100)
GLUCOSE BLD STRIP.AUTO-MCNC: 195 MG/DL (ref 65–100)
GLUCOSE SERPL-MCNC: 164 MG/DL (ref 65–100)
GLUCOSE SERPL-MCNC: 164 MG/DL (ref 65–100)
HBA1C MFR BLD: 6.9 % (ref 4.8–6)
INR PPP: 1
MAGNESIUM SERPL-MCNC: 2 MG/DL (ref 1.8–2.4)
P-R INTERVAL, ECG05: 136 MS
P-R INTERVAL, ECG05: 140 MS
P-R INTERVAL, ECG05: 150 MS
POTASSIUM SERPL-SCNC: 3 MMOL/L (ref 3.5–5.1)
POTASSIUM SERPL-SCNC: 3.2 MMOL/L (ref 3.5–5.1)
PROT SERPL-MCNC: 8.2 G/DL (ref 6.3–8.2)
PROTHROMBIN TIME: 13.2 SEC (ref 11.7–14.5)
Q-T INTERVAL, ECG07: 378 MS
Q-T INTERVAL, ECG07: 412 MS
Q-T INTERVAL, ECG07: 456 MS
QRS DURATION, ECG06: 104 MS
QRS DURATION, ECG06: 112 MS
QRS DURATION, ECG06: 114 MS
QTC CALCULATION (BEZET), ECG08: 469 MS
QTC CALCULATION (BEZET), ECG08: 492 MS
QTC CALCULATION (BEZET), ECG08: 495 MS
SODIUM SERPL-SCNC: 140 MMOL/L (ref 136–145)
SODIUM SERPL-SCNC: 140 MMOL/L (ref 136–145)
TROPONIN I SERPL-MCNC: 0.8 NG/ML (ref 0.02–0.05)
VENTRICULAR RATE, ECG03: 70 BPM
VENTRICULAR RATE, ECG03: 87 BPM
VENTRICULAR RATE, ECG03: 93 BPM

## 2019-07-01 PROCEDURE — 74011636637 HC RX REV CODE- 636/637: Performed by: NURSE PRACTITIONER

## 2019-07-01 PROCEDURE — 99152 MOD SED SAME PHYS/QHP 5/>YRS: CPT

## 2019-07-01 PROCEDURE — 4A023N7 MEASUREMENT OF CARDIAC SAMPLING AND PRESSURE, LEFT HEART, PERCUTANEOUS APPROACH: ICD-10-PCS | Performed by: INTERNAL MEDICINE

## 2019-07-01 PROCEDURE — 92941 PRQ TRLML REVSC TOT OCCL AMI: CPT

## 2019-07-01 PROCEDURE — 93005 ELECTROCARDIOGRAM TRACING: CPT | Performed by: INTERNAL MEDICINE

## 2019-07-01 PROCEDURE — 74011250637 HC RX REV CODE- 250/637: Performed by: NURSE PRACTITIONER

## 2019-07-01 PROCEDURE — 74011000250 HC RX REV CODE- 250: Performed by: INTERNAL MEDICINE

## 2019-07-01 PROCEDURE — C8929 TTE W OR WO FOL WCON,DOPPLER: HCPCS

## 2019-07-01 PROCEDURE — 93460 R&L HRT ART/VENTRICLE ANGIO: CPT

## 2019-07-01 PROCEDURE — 99153 MOD SED SAME PHYS/QHP EA: CPT

## 2019-07-01 PROCEDURE — 80048 BASIC METABOLIC PNL TOTAL CA: CPT

## 2019-07-01 PROCEDURE — 36415 COLL VENOUS BLD VENIPUNCTURE: CPT

## 2019-07-01 PROCEDURE — 92929 HC PLC DE STNT +/-PTA MAJOR COR VESL/BRNCH  ADD RC: CPT

## 2019-07-01 PROCEDURE — B2111ZZ FLUOROSCOPY OF MULTIPLE CORONARY ARTERIES USING LOW OSMOLAR CONTRAST: ICD-10-PCS | Performed by: INTERNAL MEDICINE

## 2019-07-01 PROCEDURE — 65620000000 HC RM CCU GENERAL

## 2019-07-01 PROCEDURE — 74011000258 HC RX REV CODE- 258: Performed by: INTERNAL MEDICINE

## 2019-07-01 PROCEDURE — 83036 HEMOGLOBIN GLYCOSYLATED A1C: CPT

## 2019-07-01 PROCEDURE — B2151ZZ FLUOROSCOPY OF LEFT HEART USING LOW OSMOLAR CONTRAST: ICD-10-PCS | Performed by: INTERNAL MEDICINE

## 2019-07-01 PROCEDURE — 93454 CORONARY ARTERY ANGIO S&I: CPT

## 2019-07-01 PROCEDURE — 74011250636 HC RX REV CODE- 250/636

## 2019-07-01 PROCEDURE — 74011250636 HC RX REV CODE- 250/636: Performed by: INTERNAL MEDICINE

## 2019-07-01 PROCEDURE — 82962 GLUCOSE BLOOD TEST: CPT

## 2019-07-01 PROCEDURE — 85347 COAGULATION TIME ACTIVATED: CPT

## 2019-07-01 PROCEDURE — 0270356 DILATION OF CORONARY ARTERY, ONE ARTERY, BIFURCATION, WITH TWO DRUG-ELUTING INTRALUMINAL DEVICES, PERCUTANEOUS APPROACH: ICD-10-PCS | Performed by: INTERNAL MEDICINE

## 2019-07-01 PROCEDURE — 74011250637 HC RX REV CODE- 250/637: Performed by: INTERNAL MEDICINE

## 2019-07-01 PROCEDURE — 74011636320 HC RX REV CODE- 636/320: Performed by: INTERNAL MEDICINE

## 2019-07-01 RX ORDER — HEPARIN SODIUM 200 [USP'U]/100ML
3 INJECTION, SOLUTION INTRAVENOUS CONTINUOUS
Status: DISCONTINUED | OUTPATIENT
Start: 2019-07-01 | End: 2019-07-03 | Stop reason: HOSPADM

## 2019-07-01 RX ORDER — ONDANSETRON 2 MG/ML
INJECTION INTRAMUSCULAR; INTRAVENOUS
Status: COMPLETED
Start: 2019-07-01 | End: 2019-07-01

## 2019-07-01 RX ORDER — GLIMEPIRIDE 2 MG/1
2 TABLET ORAL 2 TIMES DAILY
Status: DISCONTINUED | OUTPATIENT
Start: 2019-07-01 | End: 2019-07-03 | Stop reason: HOSPADM

## 2019-07-01 RX ORDER — GUAIFENESIN 100 MG/5ML
81 LIQUID (ML) ORAL DAILY
Status: DISCONTINUED | OUTPATIENT
Start: 2019-07-01 | End: 2019-07-03 | Stop reason: HOSPADM

## 2019-07-01 RX ORDER — CHLORTHALIDONE 25 MG/1
25 TABLET ORAL EVERY EVENING
Status: DISCONTINUED | OUTPATIENT
Start: 2019-07-01 | End: 2019-07-03 | Stop reason: HOSPADM

## 2019-07-01 RX ORDER — ONDANSETRON 2 MG/ML
4 INJECTION INTRAMUSCULAR; INTRAVENOUS
Status: DISCONTINUED | OUTPATIENT
Start: 2019-07-01 | End: 2019-07-03 | Stop reason: HOSPADM

## 2019-07-01 RX ORDER — MIDAZOLAM HYDROCHLORIDE 1 MG/ML
.5-2 INJECTION, SOLUTION INTRAMUSCULAR; INTRAVENOUS
Status: DISCONTINUED | OUTPATIENT
Start: 2019-07-01 | End: 2019-07-03 | Stop reason: HOSPADM

## 2019-07-01 RX ORDER — LORATADINE 10 MG/1
10 TABLET ORAL DAILY
Status: DISCONTINUED | OUTPATIENT
Start: 2019-07-01 | End: 2019-07-01 | Stop reason: SDUPTHER

## 2019-07-01 RX ORDER — VENLAFAXINE HYDROCHLORIDE 37.5 MG/1
37.5 CAPSULE, EXTENDED RELEASE ORAL EVERY EVENING
Status: DISCONTINUED | OUTPATIENT
Start: 2019-07-01 | End: 2019-07-03 | Stop reason: HOSPADM

## 2019-07-01 RX ORDER — ALBUTEROL SULFATE 0.83 MG/ML
2.5 SOLUTION RESPIRATORY (INHALATION)
Status: DISCONTINUED | OUTPATIENT
Start: 2019-07-01 | End: 2019-07-03 | Stop reason: HOSPADM

## 2019-07-01 RX ORDER — ROSUVASTATIN CALCIUM 20 MG/1
40 TABLET, COATED ORAL
Status: DISCONTINUED | OUTPATIENT
Start: 2019-07-01 | End: 2019-07-03 | Stop reason: HOSPADM

## 2019-07-01 RX ORDER — GABAPENTIN 300 MG/1
300 CAPSULE ORAL 2 TIMES DAILY
Status: DISCONTINUED | OUTPATIENT
Start: 2019-07-01 | End: 2019-07-03 | Stop reason: HOSPADM

## 2019-07-01 RX ORDER — POTASSIUM CHLORIDE 20 MEQ/1
40 TABLET, EXTENDED RELEASE ORAL 2 TIMES DAILY
Status: COMPLETED | OUTPATIENT
Start: 2019-07-01 | End: 2019-07-01

## 2019-07-01 RX ORDER — VENLAFAXINE HYDROCHLORIDE 150 MG/1
150 CAPSULE, EXTENDED RELEASE ORAL EVERY EVENING
Status: DISCONTINUED | OUTPATIENT
Start: 2019-07-01 | End: 2019-07-03 | Stop reason: HOSPADM

## 2019-07-01 RX ORDER — FAMOTIDINE 20 MG/1
40 TABLET, FILM COATED ORAL 2 TIMES DAILY
Status: DISCONTINUED | OUTPATIENT
Start: 2019-07-01 | End: 2019-07-03 | Stop reason: HOSPADM

## 2019-07-01 RX ORDER — FENTANYL CITRATE 50 UG/ML
25-50 INJECTION, SOLUTION INTRAMUSCULAR; INTRAVENOUS
Status: DISCONTINUED | OUTPATIENT
Start: 2019-07-01 | End: 2019-07-03 | Stop reason: HOSPADM

## 2019-07-01 RX ORDER — ACETAMINOPHEN 325 MG/1
650 TABLET ORAL
Status: DISCONTINUED | OUTPATIENT
Start: 2019-07-01 | End: 2019-07-03 | Stop reason: HOSPADM

## 2019-07-01 RX ORDER — SODIUM CHLORIDE 0.9 % (FLUSH) 0.9 %
5-40 SYRINGE (ML) INJECTION AS NEEDED
Status: DISCONTINUED | OUTPATIENT
Start: 2019-07-01 | End: 2019-07-03 | Stop reason: HOSPADM

## 2019-07-01 RX ORDER — RANOLAZINE 500 MG/1
500 TABLET, EXTENDED RELEASE ORAL 2 TIMES DAILY
Status: DISCONTINUED | OUTPATIENT
Start: 2019-07-01 | End: 2019-07-03 | Stop reason: HOSPADM

## 2019-07-01 RX ORDER — LOSARTAN POTASSIUM 50 MG/1
50 TABLET ORAL DAILY
Status: DISCONTINUED | OUTPATIENT
Start: 2019-07-02 | End: 2019-07-03 | Stop reason: HOSPADM

## 2019-07-01 RX ORDER — LORAZEPAM 0.5 MG/1
0.5 TABLET ORAL
Status: DISCONTINUED | OUTPATIENT
Start: 2019-07-01 | End: 2019-07-03 | Stop reason: HOSPADM

## 2019-07-01 RX ORDER — LORAZEPAM 1 MG/1
1 TABLET ORAL
Status: DISCONTINUED | OUTPATIENT
Start: 2019-07-01 | End: 2019-07-03 | Stop reason: HOSPADM

## 2019-07-01 RX ORDER — LORATADINE 10 MG/1
10 TABLET ORAL DAILY
Status: DISCONTINUED | OUTPATIENT
Start: 2019-07-02 | End: 2019-07-03 | Stop reason: HOSPADM

## 2019-07-01 RX ORDER — CLOPIDOGREL BISULFATE 75 MG/1
75 TABLET ORAL DAILY
Status: DISCONTINUED | OUTPATIENT
Start: 2019-07-03 | End: 2019-07-03 | Stop reason: HOSPADM

## 2019-07-01 RX ORDER — SODIUM CHLORIDE 9 MG/ML
75 INJECTION, SOLUTION INTRAVENOUS CONTINUOUS
Status: DISPENSED | OUTPATIENT
Start: 2019-07-01 | End: 2019-07-01

## 2019-07-01 RX ORDER — NEBIVOLOL 10 MG/1
10 TABLET ORAL DAILY
Status: DISCONTINUED | OUTPATIENT
Start: 2019-07-02 | End: 2019-07-02

## 2019-07-01 RX ORDER — HEPARIN SODIUM 10000 [USP'U]/ML
40-80 INJECTION, SOLUTION INTRAVENOUS; SUBCUTANEOUS
Status: DISCONTINUED | OUTPATIENT
Start: 2019-07-01 | End: 2019-07-03 | Stop reason: HOSPADM

## 2019-07-01 RX ORDER — CLOPIDOGREL BISULFATE 75 MG/1
300 TABLET ORAL ONCE
Status: COMPLETED | OUTPATIENT
Start: 2019-07-02 | End: 2019-07-02

## 2019-07-01 RX ORDER — INSULIN LISPRO 100 [IU]/ML
INJECTION, SOLUTION INTRAVENOUS; SUBCUTANEOUS
Status: DISCONTINUED | OUTPATIENT
Start: 2019-07-01 | End: 2019-07-03 | Stop reason: HOSPADM

## 2019-07-01 RX ORDER — MORPHINE SULFATE 2 MG/ML
2 INJECTION, SOLUTION INTRAMUSCULAR; INTRAVENOUS
Status: DISCONTINUED | OUTPATIENT
Start: 2019-07-01 | End: 2019-07-03 | Stop reason: HOSPADM

## 2019-07-01 RX ORDER — NEBIVOLOL 10 MG/1
10 TABLET ORAL DAILY
Status: DISCONTINUED | OUTPATIENT
Start: 2019-07-01 | End: 2019-07-01 | Stop reason: SDUPTHER

## 2019-07-01 RX ORDER — LIDOCAINE HYDROCHLORIDE 10 MG/ML
10-200 INJECTION INFILTRATION; PERINEURAL ONCE
Status: COMPLETED | OUTPATIENT
Start: 2019-07-01 | End: 2019-06-30

## 2019-07-01 RX ORDER — SODIUM CHLORIDE 0.9 % (FLUSH) 0.9 %
5-40 SYRINGE (ML) INJECTION EVERY 8 HOURS
Status: DISCONTINUED | OUTPATIENT
Start: 2019-07-01 | End: 2019-07-03 | Stop reason: HOSPADM

## 2019-07-01 RX ORDER — LOSARTAN POTASSIUM 50 MG/1
50 TABLET ORAL DAILY
Status: DISCONTINUED | OUTPATIENT
Start: 2019-07-01 | End: 2019-07-01 | Stop reason: SDUPTHER

## 2019-07-01 RX ORDER — NITROGLYCERIN 0.4 MG/1
0.4 TABLET SUBLINGUAL
Status: DISCONTINUED | OUTPATIENT
Start: 2019-07-01 | End: 2019-07-03 | Stop reason: HOSPADM

## 2019-07-01 RX ADMIN — INSULIN LISPRO 2 UNITS: 100 INJECTION, SOLUTION INTRAVENOUS; SUBCUTANEOUS at 17:23

## 2019-07-01 RX ADMIN — INSULIN LISPRO 2 UNITS: 100 INJECTION, SOLUTION INTRAVENOUS; SUBCUTANEOUS at 11:56

## 2019-07-01 RX ADMIN — ONDANSETRON 4 MG: 2 INJECTION INTRAMUSCULAR; INTRAVENOUS at 22:45

## 2019-07-01 RX ADMIN — TICAGRELOR 90 MG: 90 TABLET ORAL at 13:48

## 2019-07-01 RX ADMIN — Medication 10 ML: at 21:28

## 2019-07-01 RX ADMIN — POTASSIUM CHLORIDE 40 MEQ: 20 TABLET, EXTENDED RELEASE ORAL at 08:41

## 2019-07-01 RX ADMIN — AMIODARONE HYDROCHLORIDE 150 MG: 50 INJECTION, SOLUTION INTRAVENOUS at 00:01

## 2019-07-01 RX ADMIN — TICAGRELOR 180 MG: 90 TABLET ORAL at 00:42

## 2019-07-01 RX ADMIN — GLIMEPIRIDE 2 MG: 2 TABLET ORAL at 08:42

## 2019-07-01 RX ADMIN — SODIUM CHLORIDE 75 ML/HR: 900 INJECTION, SOLUTION INTRAVENOUS at 01:10

## 2019-07-01 RX ADMIN — HEPARIN SODIUM 5500 UNITS: 10000 INJECTION, SOLUTION INTRAVENOUS; SUBCUTANEOUS at 00:05

## 2019-07-01 RX ADMIN — TIROFIBAN 2300 MCG: 3.75 INJECTION, SOLUTION INTRAVENOUS at 00:09

## 2019-07-01 RX ADMIN — ROSUVASTATIN CALCIUM 40 MG: 20 TABLET, COATED ORAL at 21:28

## 2019-07-01 RX ADMIN — Medication 10 ML: at 13:49

## 2019-07-01 RX ADMIN — FENTANYL CITRATE 25 MCG: 50 INJECTION, SOLUTION INTRAMUSCULAR; INTRAVENOUS at 00:51

## 2019-07-01 RX ADMIN — GLIMEPIRIDE 2 MG: 2 TABLET ORAL at 17:13

## 2019-07-01 RX ADMIN — RANOLAZINE 500 MG: 500 TABLET, FILM COATED, EXTENDED RELEASE ORAL at 17:12

## 2019-07-01 RX ADMIN — INSULIN LISPRO 2 UNITS: 100 INJECTION, SOLUTION INTRAVENOUS; SUBCUTANEOUS at 08:40

## 2019-07-01 RX ADMIN — VENLAFAXINE HYDROCHLORIDE 150 MG: 150 CAPSULE, EXTENDED RELEASE ORAL at 18:00

## 2019-07-01 RX ADMIN — FENTANYL CITRATE 25 MCG: 50 INJECTION, SOLUTION INTRAMUSCULAR; INTRAVENOUS at 00:21

## 2019-07-01 RX ADMIN — POTASSIUM CHLORIDE 40 MEQ: 20 TABLET, EXTENDED RELEASE ORAL at 17:13

## 2019-07-01 RX ADMIN — LORAZEPAM 0.5 MG: 1 TABLET ORAL at 13:49

## 2019-07-01 RX ADMIN — Medication 10 ML: at 01:10

## 2019-07-01 RX ADMIN — ASPIRIN 81 MG 81 MG: 81 TABLET ORAL at 08:42

## 2019-07-01 RX ADMIN — TIROFIBAN 0.15 MCG/KG/MIN: 5 INJECTION, SOLUTION INTRAVENOUS at 00:09

## 2019-07-01 RX ADMIN — PERFLUTREN 1 ML: 6.52 INJECTION, SUSPENSION INTRAVENOUS at 13:00

## 2019-07-01 RX ADMIN — CHLORTHALIDONE 25 MG: 25 TABLET ORAL at 17:13

## 2019-07-01 RX ADMIN — IOPAMIDOL 265 ML: 755 INJECTION, SOLUTION INTRAVENOUS at 00:50

## 2019-07-01 RX ADMIN — VENLAFAXINE HYDROCHLORIDE 37.5 MG: 37.5 CAPSULE, EXTENDED RELEASE ORAL at 17:12

## 2019-07-01 RX ADMIN — RANOLAZINE 500 MG: 500 TABLET, FILM COATED, EXTENDED RELEASE ORAL at 08:41

## 2019-07-01 RX ADMIN — MORPHINE SULFATE 2 MG: 2 INJECTION, SOLUTION INTRAMUSCULAR; INTRAVENOUS at 07:48

## 2019-07-01 RX ADMIN — SODIUM CHLORIDE 75 ML/HR: 900 INJECTION, SOLUTION INTRAVENOUS at 05:12

## 2019-07-01 RX ADMIN — Medication 10 ML: at 05:12

## 2019-07-01 RX ADMIN — NITROGLYCERIN 0.1 MG: 200 INJECTION, SOLUTION INTRAVENOUS at 00:34

## 2019-07-01 NOTE — PROGRESS NOTES
TRANSFER - OUT REPORT:    Verbal report given to RN on Alden Aldridge  being transferred to CCU for routine progression of care       Report consisted of patients Situation, Background, Assessment and Recommendations(SBAR). Information from the following report(s) SBAR, Kardex, Procedure Summary and MAR was reviewed with the receiving nurse. Opportunity for questions and clarification was provided.       STEMI with Dr Radha Cabrera  aggrastat bolus and drip   5500 heparin  180 brilinta  150 amiodarone  2 versed  100 fentanyl  Right radial Rband 10ml at [place] [address]

## 2019-07-01 NOTE — PROGRESS NOTES
Bedside and Verbal shift change report given to Kassidy Henry RN (oncoming nurse) by Jeb Redd RN (offgoing nurse). Report included the following information SBAR, Kardex, ED Summary, Procedure Summary, Intake/Output, MAR, Recent Results, Cardiac Rhythm NSR and Alarm Parameters .     R radial site assessed- dressing c/d/i with no hematoma, no bleeding, no ecchymosis

## 2019-07-01 NOTE — PROCEDURES
[address] [facility]  CARDIAC CATH    Name:  Linsey Winn  MR#:  [id number]  :  1970  ACCOUNT #:  [de-identified]  DATE OF SERVICE:  2019    PRIMARY CARE:  Stepan Vick MD    PRIMARY CARDIOLOGIST:  Maynor Raphael MD    PROCEDURES PERFORMED:  Left heart cath with coronary angiography, aspiration catheter to the distal right coronary artery, bifurcation V-stenting of the posterior descending and posterolateral branches of the right coronary using [place] drug-eluting stents. PREOPERATIVE DIAGNOSIS:  Acute inferior myocardial infarction. POSTOPERATIVE DIAGNOSIS:   Acute inferior myocardial infarction. SURGEON:  Henri Ross MD    ASSISTANT:  None. ESTIMATED BLOOD LOSS:  Less than 5 mL. SPECIMENS REMOVED:  None. COMPLICATIONS:  None. IMPLANTS:  See below. ANESTHESIA:  Conscious sedation. The patient was sedated by Remi Pina RN with a frailty score of 4 using a total of 2 mg of Versed, 100 mcg fentanyl and monitored from 11:53 p.m. until 12:51 a.m. TOTAL CONTRAST:  265 mL of Isovue. PROCEDURE TECHNIQUE:  The patient was urgently brought from the emergency room to the cath lab, prepped and draped in the usual fashion. A 6-Czech sheath was placed in the right radial artery via the micropuncture modified Seldinger technique and left heart catheterization performed using standard 5-Czech Tiger and 6-Czech JR-4 guiding catheter for the right coronary injection. Ventriculogram was not performed to conserve contrast.  Heparin and Aggrastat were used for the intervention with a final ACT of 307 seconds. 180 mg of Brilinta was administered. The patient had aspirin in the Emergency Department and prior to arrival as well at home. Manual pressure will be applied to the patient's access site via TR band protocol. PRESSURE RESULTS:  Aorta 140/75.     Left ventriculogram not performed to conserve contrast.    CORONARY ANATOMY:  The left main has mild irregularity dividing into an LAD and circumflex in the usual fashion. The LAD has diffuse moderate disease extending from the mid into the distal vessel. At the takeoff of a small second diagonal branch, the distal LAD is flush occluded. This is a chronic finding and was unable to be crossed in 2017. The proximal LAD has mild irregularity but there is diffuse midvessel disease up to about 50%. There are two diagonal branches, both of which have moderate proximal irregularity of 30-40% . The circumflex is a fairly large, but nondominant system which gives off a large first obtuse marginal branch. The entire circumflex distribution has smooth disease up to about 20% utmost.    The right coronary is a large dominant vessel supplying a large posterior descending and a moderate size posterolateral branch. The right coronary proper has mild irregularities with a focal 20% proximal narrowing. There is critical bifurcation disease with sluggish flow in both the posterior descending and posterolateral branches. There is an ostial 80% hazy stenosis in the posterior descending branch and a 99% thrombotic lesion in the posterolateral ostium. PERCUTANEOUS INTERVENTION REPORT:  After systemic anticoagulation with heparin and initiation of Aggrastat and verification of therapeutic ACT, a Runthrough wire was advanced into the posterolateral branch and a Prowater into the posterior descending branch. We aspirated the posterolateral branch with an Houston catheter and then sequentially dilated with a 2.5-mm compliant balloon to nominal atmospheres in the posterolateral ostium and then the posterior descending ostium. There was marked improvement in flow and marked reduction in stenosis in both vessels. We then performed a V-stent deploying a 2.5 x 15 mm Hakeem in the posterolateral and a 2.75 x 30 mm Lagrange in the posterior descending branch.   Both stents were deployed initially to nominal atmospheres and then inflated to 14 atmospheres. We postdilated with a 2.75-mm noncompliant balloon in the posterior descending and a 2.5-mm noncompliant balloon in the posterolateral branch, but the posterolateral ostium appeared underexpanded. We kissed the final time with two 2.75-mm noncompliant balloons at the ostium of both vessels and in the distal right coronary proper, both to 16 atmospheres. There was good stent expansion and apposition with less than 10% residual stenosis, no dissection and SCAR III flow in both large vessels. The procedure was terminated. CONCLUSIONS:  1. Complex bifurcation stenting in the distal right coronary bifurcation with [place] drug-eluting stents extending into the proximal portion of both the posterior descending and posterolateral branches. 2.  Chronically occluded apical LAD. 3.  Ventricular function will be assessed by echocardiography in the morning. 4.  Recommend aggressive risk factor modification in the outpatient setting.       Paulo Quiles MD      [id number]  D:  07/01/2019 1:06  T:  07/01/2019 1:10  JOB #:  [id number]  CC:  MD Stepan Otto MD       [facility] Cardiology

## 2019-07-01 NOTE — PROGRESS NOTES
TRANSFER - IN REPORT:    Verbal report received from Lubna RN (name) on Darnella Pencil  being received from cath lab (unit) for routine progression of care      Report consisted of patients Situation, Background, Assessment and   Recommendations(SBAR). Information from the following report(s) SBAR, Kardex, Procedure Summary, MAR, Recent Results, Cardiac Rhythm NSR and Alarm Parameters  was reviewed with the receiving nurse. Opportunity for questions and clarification was provided.

## 2019-07-01 NOTE — PROGRESS NOTES
Patient arrived to unit and placed on cardiac and respiratory monitors. Chlorhexidene bath given and dual skin assessment performed with ROGELIO Sharma. Scattered abrasions and ecchymosis noted. Scars to bilateral breasts and left foot from bunion surgery and permanent toenail removals. No skin breakdown or pressure ulcers noted.

## 2019-07-01 NOTE — INTERDISCIPLINARY ROUNDS
Interdisciplinary team rounds were held 7/1/2019 with the following team members:Care Management, Nursing, Nurse Practitioner, Nutrition, Palliative Care, Pastoral Care, Pharmacy, Physical Therapy, Physician, Respiratory Therapy and Clinical Coordinator and the patient. Plan of care discussed. See clinical pathway and/or care plan for interventions and desired outcomes.

## 2019-07-01 NOTE — PROGRESS NOTES
Pt seen in CCU s/p admission STEMI and heart cath. Alert and oriented currently. Family at bedside. Confirms demographics. No needs at present voiced at present for d/c. Discussed importance of follow up with medication and cardiology. CM will follow for any needs per MD.     Care Management Interventions  PCP Verified by CM: Yes(confirms PCP)  Mode of Transport at Discharge:  Other (see comment)  Transition of Care Consult (CM Consult): Discharge Planning  Discharge Durable Medical Equipment: (glucometer)  Current Support Network: Lives with Spouse, Own Home(lives with spouse, Sharad [phone] and daughter, Ye Carlisle [phone])  Confirm Follow Up Transport: Self  Plan discussed with Pt/Family/Caregiver: Yes  Freedom of Choice Offered: Yes  [employer] Information Provided?: (confirms [employer] - able to get rx)  Discharge Location  Discharge Placement: Unable to determine at this time
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
## 2019-07-01 NOTE — ED TRIAGE NOTES
C/o substernal chest pain radiating through to back and down bilateral lower extremities. Onset approx 2230 while sitting on couch. Reports shortness of breath, nausea and diaphoresis. Describes as \"pressure and heaviness\". States constant since onset. Reports same pain as MI in 5/2016. Last heart cath 12/2017. Told blockage to LAD however unable to stent. Attempted ativan 0.5mg, asa 81mg pta.  Followed by dr Matthew Ivey
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
## 2019-07-02 LAB
ANION GAP SERPL CALC-SCNC: 9 MMOL/L (ref 7–16)
ATRIAL RATE: 98 BPM
BASOPHILS # BLD: 0 K/UL (ref 0–0.2)
BASOPHILS NFR BLD: 0 % (ref 0–2)
BUN SERPL-MCNC: 19 MG/DL (ref 6–23)
CALCIUM SERPL-MCNC: 9 MG/DL (ref 8.3–10.4)
CALCULATED P AXIS, ECG09: 52 DEGREES
CALCULATED R AXIS, ECG10: -30 DEGREES
CALCULATED T AXIS, ECG11: -15 DEGREES
CHLORIDE SERPL-SCNC: 102 MMOL/L (ref 98–107)
CO2 SERPL-SCNC: 26 MMOL/L (ref 21–32)
CREAT SERPL-MCNC: 0.77 MG/DL (ref 0.6–1)
DIAGNOSIS, 93000: NORMAL
DIFFERENTIAL METHOD BLD: ABNORMAL
EOSINOPHIL # BLD: 0.1 K/UL (ref 0–0.8)
EOSINOPHIL NFR BLD: 0 % (ref 0.5–7.8)
ERYTHROCYTE [DISTWIDTH] IN BLOOD BY AUTOMATED COUNT: 12.6 % (ref 11.9–14.6)
GLUCOSE BLD STRIP.AUTO-MCNC: 151 MG/DL (ref 65–100)
GLUCOSE BLD STRIP.AUTO-MCNC: 157 MG/DL (ref 65–100)
GLUCOSE BLD STRIP.AUTO-MCNC: 189 MG/DL (ref 65–100)
GLUCOSE BLD STRIP.AUTO-MCNC: 193 MG/DL (ref 65–100)
GLUCOSE SERPL-MCNC: 229 MG/DL (ref 65–100)
HCT VFR BLD AUTO: 49.5 % (ref 35.8–46.3)
HGB BLD-MCNC: 17.2 G/DL (ref 11.7–15.4)
IMM GRANULOCYTES # BLD AUTO: 0.1 K/UL (ref 0–0.5)
IMM GRANULOCYTES NFR BLD AUTO: 0 % (ref 0–5)
LYMPHOCYTES # BLD: 1.6 K/UL (ref 0.5–4.6)
LYMPHOCYTES NFR BLD: 8 % (ref 13–44)
MAGNESIUM SERPL-MCNC: 1.8 MG/DL (ref 1.8–2.4)
MCH RBC QN AUTO: 29.3 PG (ref 26.1–32.9)
MCHC RBC AUTO-ENTMCNC: 34.7 G/DL (ref 31.4–35)
MCV RBC AUTO: 84.3 FL (ref 79.6–97.8)
MONOCYTES # BLD: 0.8 K/UL (ref 0.1–1.3)
MONOCYTES NFR BLD: 4 % (ref 4–12)
NEUTS SEG # BLD: 16.8 K/UL (ref 1.7–8.2)
NEUTS SEG NFR BLD: 87 % (ref 43–78)
NRBC # BLD: 0 K/UL (ref 0–0.2)
P-R INTERVAL, ECG05: 138 MS
PLATELET # BLD AUTO: 200 K/UL (ref 150–450)
PMV BLD AUTO: 12.1 FL (ref 9.4–12.3)
POTASSIUM SERPL-SCNC: 3.9 MMOL/L (ref 3.5–5.1)
Q-T INTERVAL, ECG07: 394 MS
QRS DURATION, ECG06: 104 MS
QTC CALCULATION (BEZET), ECG08: 503 MS
RBC # BLD AUTO: 5.87 M/UL (ref 4.05–5.2)
SODIUM SERPL-SCNC: 137 MMOL/L (ref 136–145)
VENTRICULAR RATE, ECG03: 98 BPM
WBC # BLD AUTO: 19.4 K/UL (ref 4.3–11.1)

## 2019-07-02 PROCEDURE — 93005 ELECTROCARDIOGRAM TRACING: CPT | Performed by: INTERNAL MEDICINE

## 2019-07-02 PROCEDURE — 85025 COMPLETE CBC W/AUTO DIFF WBC: CPT

## 2019-07-02 PROCEDURE — 83735 ASSAY OF MAGNESIUM: CPT

## 2019-07-02 PROCEDURE — 65660000000 HC RM CCU STEPDOWN

## 2019-07-02 PROCEDURE — 74011636637 HC RX REV CODE- 636/637: Performed by: NURSE PRACTITIONER

## 2019-07-02 PROCEDURE — 74011250637 HC RX REV CODE- 250/637: Performed by: INTERNAL MEDICINE

## 2019-07-02 PROCEDURE — 80048 BASIC METABOLIC PNL TOTAL CA: CPT

## 2019-07-02 PROCEDURE — 94760 N-INVAS EAR/PLS OXIMETRY 1: CPT

## 2019-07-02 PROCEDURE — 36415 COLL VENOUS BLD VENIPUNCTURE: CPT

## 2019-07-02 PROCEDURE — 74011636637 HC RX REV CODE- 636/637: Performed by: INTERNAL MEDICINE

## 2019-07-02 PROCEDURE — 82962 GLUCOSE BLOOD TEST: CPT

## 2019-07-02 RX ORDER — METOPROLOL SUCCINATE 50 MG/1
50 TABLET, EXTENDED RELEASE ORAL DAILY
Status: DISCONTINUED | OUTPATIENT
Start: 2019-07-02 | End: 2019-07-03 | Stop reason: HOSPADM

## 2019-07-02 RX ADMIN — Medication 10 ML: at 21:07

## 2019-07-02 RX ADMIN — VENLAFAXINE HYDROCHLORIDE 150 MG: 150 CAPSULE, EXTENDED RELEASE ORAL at 17:05

## 2019-07-02 RX ADMIN — GLIMEPIRIDE 2 MG: 2 TABLET ORAL at 17:06

## 2019-07-02 RX ADMIN — CLOPIDOGREL BISULFATE 300 MG: 75 TABLET ORAL at 00:55

## 2019-07-02 RX ADMIN — FAMOTIDINE 40 MG: 20 TABLET ORAL at 17:04

## 2019-07-02 RX ADMIN — LORAZEPAM 0.5 MG: 1 TABLET ORAL at 00:02

## 2019-07-02 RX ADMIN — INSULIN LISPRO 2 UNITS: 100 INJECTION, SOLUTION INTRAVENOUS; SUBCUTANEOUS at 11:31

## 2019-07-02 RX ADMIN — Medication 10 ML: at 05:04

## 2019-07-02 RX ADMIN — GLIMEPIRIDE 2 MG: 2 TABLET ORAL at 09:08

## 2019-07-02 RX ADMIN — ASPIRIN 81 MG 81 MG: 81 TABLET ORAL at 09:08

## 2019-07-02 RX ADMIN — Medication 5 ML: at 13:49

## 2019-07-02 RX ADMIN — CHLORTHALIDONE 25 MG: 25 TABLET ORAL at 17:06

## 2019-07-02 RX ADMIN — INSULIN LISPRO 2 UNITS: 100 INJECTION, SOLUTION INTRAVENOUS; SUBCUTANEOUS at 16:43

## 2019-07-02 RX ADMIN — METOPROLOL SUCCINATE 50 MG: 50 TABLET, EXTENDED RELEASE ORAL at 21:12

## 2019-07-02 RX ADMIN — LOSARTAN POTASSIUM 50 MG: 50 TABLET ORAL at 21:08

## 2019-07-02 RX ADMIN — RANOLAZINE 500 MG: 500 TABLET, FILM COATED, EXTENDED RELEASE ORAL at 17:05

## 2019-07-02 RX ADMIN — ROSUVASTATIN CALCIUM 40 MG: 20 TABLET, COATED ORAL at 21:09

## 2019-07-02 RX ADMIN — INSULIN LISPRO 2 UNITS: 100 INJECTION, SOLUTION INTRAVENOUS; SUBCUTANEOUS at 21:06

## 2019-07-02 RX ADMIN — INSULIN LISPRO 2 UNITS: 100 INJECTION, SOLUTION INTRAVENOUS; SUBCUTANEOUS at 07:46

## 2019-07-02 RX ADMIN — RANOLAZINE 500 MG: 500 TABLET, FILM COATED, EXTENDED RELEASE ORAL at 09:08

## 2019-07-02 RX ADMIN — VENLAFAXINE HYDROCHLORIDE 37.5 MG: 37.5 CAPSULE, EXTENDED RELEASE ORAL at 17:05

## 2019-07-02 NOTE — PROGRESS NOTES
TRANSFER - IN REPORT:    Verbal report received from Lisa Taylor RN on Brynn Screws being received from CCU for routine progression of care      Report consisted of patients Situation, Background, Assessment and Recommendations(SBAR). Information from the following report(s) SBAR, Kardex, Intake/Output and Recent Results was reviewed with the receiving nurse. Opportunity for questions and clarification was provided. Assessment completed upon patients arrival to unit and care assumed. Patient arrived to floor. Telemetry monitor checked. Admission and skin assessment completed upon arrival to floor. Patient educated to call for assistance when getting out of bed. Patient verbalized understanding of education. Bed low and locked. Call light within reach. Side rails X2. Skin assessment completed. Sacrum pink blanchable. Heels are intact. R radial site s/p LHC. No other skin issues noted at this time.

## 2019-07-02 NOTE — PROGRESS NOTES
Care Management Interventions  PCP Verified by CM: Yes(confirms PCP)  Mode of Transport at Discharge: Other (see comment)  Transition of Care Consult (CM Consult): Discharge Planning  Discharge Durable Medical Equipment: (glucometer)  Current Support Network: Lives with Spouse, Own Home(lives with spouse, Nima [phone] and daughter, Amber Yoo [phone])  Confirm Follow Up Transport: Self  Plan discussed with Pt/Family/Caregiver: Yes  Freedom of Choice Offered: Yes  [employer] Information Provided?: (confirms [employer] - able to get rx)  Discharge Location  Discharge Placement: Unable to determine at this time      Pt transferred from ICU to 3rd floor telemetry. Agree with previous CM assessment, CM to continue to monitor for dc needs.

## 2019-07-02 NOTE — INTERDISCIPLINARY ROUNDS
Interdisciplinary team rounds were held 7/2/2019 with the following team members:Care Management, Nursing, Nurse Practitioner, Nutrition, Palliative Care, Pastoral Care, Pharmacy, Physical Therapy, Physician, Respiratory Therapy and Clinical Coordinator and the patient. Plan of care discussed. See clinical pathway and/or care plan for interventions and desired outcomes.

## 2019-07-02 NOTE — PROGRESS NOTES
[facility] CARDIOLOGY PROGRESS NOTE           7/2/2019 7:27 AM    Admit Date: 6/30/2019      Subjective:   Patient feels better. Moderate nausea. Hemodynamics are stable. ROS:  Cardiovascular:  As noted above    Objective:      Vitals:    07/02/19 0301 07/02/19 0400 07/02/19 0500 07/02/19 0600   BP:  129/84 101/69 106/67   Pulse: 92 92 88 83   Resp: 14 18 20 20   Temp:       SpO2: 95% 91% 90% 91%   Weight:       Height:           Physical Exam:  General-No Acute Distress  Neck- supple, no JVD  CV- regular rate and rhythm no MRG  Lung- clear bilaterally  Abd- soft, nontender, nondistended  Ext- no edema bilaterally. Skin- warm and dry    Data Review:   Recent Labs     07/02/19  0423 07/01/19  0527 06/30/19  2332    140 140   K 3.9 3.0* 3.2*   MG 1.8  --  2.0   BUN 19 25* 30*   CREA 0.77 0.70 0.94   * 164* 164*   WBC 19.4*  --  14.7*   HGB 17.2*  --  16.8*   HCT 49.5*  --  49.0*     --  240   INR  --   --  1.0   TROIQ  --   --  0.80*       Assessment/Plan:     Principal Problem:    [facility] (6/30/2019)    IMI - s/p PCI of the PDA/JAMES. Bifurcation stenting. On ASA and clopidogrel. EF 45-50%. Change Bystolic to metoprolol succinate and continue losartan.       Active Problems:    Diabetes mellitus type 2, controlled ([facility] (4/15/2016)    BG remain elevated       Essential hypertension with goal blood pressure less than 140/90 (5/17/2016)    This is stable       Dyslipidemia (5/17/2016)    On rosuvastatin       RONNIE (obstructive sleep apnea) (10/4/2016)    Chronic       Obesity (BMI 30-39.9) (10/4/2016)    Chronic       Acute inferior myocardial infarction ([facility] (7/1/2019)    As above    Transfer to [facility]          Clifford Potter MD  7/2/2019 7:27 AM

## 2019-07-02 NOTE — PROGRESS NOTES
Patient experienced emesis occurrence, denies chest pain. Patient states she has been nauseous since dinner and believes it is the food that caused the vomiting. Prn zofran administered as ordered. Patient refusing monitoring equipment at this time and ambulating in room with nurse at bedside.

## 2019-07-02 NOTE — PROGRESS NOTES
TRANSFER - OUT REPORT:    Verbal report given to Brad Cole RN(name) on Enma All  being transferred to telemetry(unit) for routine progression of care       Report consisted of patients Situation, Background, Assessment and   Recommendations(SBAR). Information from the following report(s) SBAR, Procedure Summary, Intake/Output, MAR, Recent Results, Med Rec Status and Cardiac Rhythm NSR was reviewed with the receiving nurse. Lines:   Peripheral IV 06/30/19 Left Forearm (Active)   Site Assessment Clean, dry, & intact 7/2/2019  7:28 AM   Phlebitis Assessment 0 7/2/2019  7:28 AM   Infiltration Assessment 0 7/2/2019  7:28 AM   Dressing Status Clean, dry, & intact 7/2/2019  7:28 AM   Dressing Type Tape;Transparent 7/2/2019  7:28 AM   Hub Color/Line Status Green;Patent; Flushed 7/2/2019  7:28 AM   Alcohol Cap Used No 7/2/2019  7:28 AM        Opportunity for questions and clarification was provided.       Patient transported with:   Monitor  Registered Nurse

## 2019-07-02 NOTE — PROGRESS NOTES
Bedside and Verbal shift change report given to April, RN (oncoming nurse) by self Jose chicas). Report included the following information SBAR, Kardex, Intake/Output, MAR, Recent Results and Med Rec Status. R radial site assessed at bedside.

## 2019-07-02 NOTE — PROGRESS NOTES
Critical Care Outreach Nurse Progress Report:    Subjective: In to assess pt secondary to recent transfer from CCU. MEWS Score: 1 (07/02/19 1031)    Vitals:    07/02/19 0500 07/02/19 0600 07/02/19 0728 07/02/19 1031   BP: 101/69 106/67 123/86 123/82   Pulse: 88 83 99 94   Resp: 20 20 17 18   Temp:   98.1 °F (36.7 °C) 98.5 °F (36.9 °C)   SpO2: 90% 91% 93% 94%   Weight:       Height:    5' 7\" (1.702 m)        Objective: Patient sitting up on the side of the bed eating. Pain Intensity 1: 0 (07/02/19 1017)  Pain Location 1: Chest  Pain Intervention(s) 1: Medication (see MAR)  Patient Stated Pain Goal: 0    Assessment: Patient alert and oriented. States she is ready to go home! Denies pain or shortness of breath. She did have some nausea and vomiting last night. Right radial procedural site CDI. On remote telemetry. Plan: Will continue to monitor.

## 2019-07-02 NOTE — PROGRESS NOTES
Bedside and Verbal shift change report given to [address] (oncoming nurse) by Lionel Everett (offgoing nurse). Report included the following information SBAR, Kardex, Intake/Output, MAR, Recent Results, Cardiac Rhythm NSR and Alarm Parameters .

## 2019-07-03 VITALS
RESPIRATION RATE: 17 BRPM | HEIGHT: 67 IN | WEIGHT: 193.31 LBS | OXYGEN SATURATION: 98 % | BODY MASS INDEX: 30.34 KG/M2 | HEART RATE: 91 BPM | SYSTOLIC BLOOD PRESSURE: 120 MMHG | DIASTOLIC BLOOD PRESSURE: 82 MMHG | TEMPERATURE: 97.4 F

## 2019-07-03 PROBLEM — E87.6 HYPOKALEMIA: Status: ACTIVE | Noted: 2019-07-03

## 2019-07-03 LAB
ANION GAP SERPL CALC-SCNC: 8 MMOL/L (ref 7–16)
BASOPHILS # BLD: 0 K/UL (ref 0–0.2)
BASOPHILS NFR BLD: 0 % (ref 0–2)
BUN SERPL-MCNC: 19 MG/DL (ref 6–23)
CALCIUM SERPL-MCNC: 8.7 MG/DL (ref 8.3–10.4)
CHLORIDE SERPL-SCNC: 101 MMOL/L (ref 98–107)
CO2 SERPL-SCNC: 31 MMOL/L (ref 21–32)
CREAT SERPL-MCNC: 0.68 MG/DL (ref 0.6–1)
DIFFERENTIAL METHOD BLD: ABNORMAL
EOSINOPHIL # BLD: 0.2 K/UL (ref 0–0.8)
EOSINOPHIL NFR BLD: 2 % (ref 0.5–7.8)
ERYTHROCYTE [DISTWIDTH] IN BLOOD BY AUTOMATED COUNT: 12.6 % (ref 11.9–14.6)
GLUCOSE BLD STRIP.AUTO-MCNC: 117 MG/DL (ref 65–100)
GLUCOSE SERPL-MCNC: 116 MG/DL (ref 65–100)
HCT VFR BLD AUTO: 46.7 % (ref 35.8–46.3)
HGB BLD-MCNC: 16.1 G/DL (ref 11.7–15.4)
IMM GRANULOCYTES # BLD AUTO: 0.1 K/UL (ref 0–0.5)
IMM GRANULOCYTES NFR BLD AUTO: 0 % (ref 0–5)
LYMPHOCYTES # BLD: 3.1 K/UL (ref 0.5–4.6)
LYMPHOCYTES NFR BLD: 22 % (ref 13–44)
MCH RBC QN AUTO: 29.2 PG (ref 26.1–32.9)
MCHC RBC AUTO-ENTMCNC: 34.5 G/DL (ref 31.4–35)
MCV RBC AUTO: 84.6 FL (ref 79.6–97.8)
MONOCYTES # BLD: 1.2 K/UL (ref 0.1–1.3)
MONOCYTES NFR BLD: 8 % (ref 4–12)
NEUTS SEG # BLD: 9.6 K/UL (ref 1.7–8.2)
NEUTS SEG NFR BLD: 68 % (ref 43–78)
NRBC # BLD: 0 K/UL (ref 0–0.2)
PLATELET # BLD AUTO: 195 K/UL (ref 150–450)
PMV BLD AUTO: 12.6 FL (ref 9.4–12.3)
POTASSIUM SERPL-SCNC: 3 MMOL/L (ref 3.5–5.1)
RBC # BLD AUTO: 5.52 M/UL (ref 4.05–5.2)
SODIUM SERPL-SCNC: 140 MMOL/L (ref 136–145)
WBC # BLD AUTO: 14.2 K/UL (ref 4.3–11.1)

## 2019-07-03 PROCEDURE — 80048 BASIC METABOLIC PNL TOTAL CA: CPT

## 2019-07-03 PROCEDURE — 82962 GLUCOSE BLOOD TEST: CPT

## 2019-07-03 PROCEDURE — 74011250637 HC RX REV CODE- 250/637: Performed by: NURSE PRACTITIONER

## 2019-07-03 PROCEDURE — 36415 COLL VENOUS BLD VENIPUNCTURE: CPT

## 2019-07-03 PROCEDURE — 74011250637 HC RX REV CODE- 250/637: Performed by: INTERNAL MEDICINE

## 2019-07-03 PROCEDURE — 85025 COMPLETE CBC W/AUTO DIFF WBC: CPT

## 2019-07-03 RX ORDER — CLOPIDOGREL BISULFATE 75 MG/1
75 TABLET ORAL DAILY
Qty: 30 TAB | Refills: 11 | Status: SHIPPED | OUTPATIENT
Start: 2019-07-03 | End: 2019-07-09 | Stop reason: SDUPTHER

## 2019-07-03 RX ORDER — NITROGLYCERIN 0.4 MG/1
0.4 TABLET SUBLINGUAL
Qty: 1 BOTTLE | Refills: 3 | Status: SHIPPED | OUTPATIENT
Start: 2019-07-03 | End: 2019-07-09 | Stop reason: SDUPTHER

## 2019-07-03 RX ORDER — POTASSIUM CHLORIDE 20 MEQ/1
20 TABLET, EXTENDED RELEASE ORAL DAILY
Qty: 30 TAB | Refills: 6 | Status: SHIPPED | OUTPATIENT
Start: 2019-07-04 | End: 2019-12-18 | Stop reason: SDUPTHER

## 2019-07-03 RX ORDER — POTASSIUM CHLORIDE 20 MEQ/1
40 TABLET, EXTENDED RELEASE ORAL EVERY 4 HOURS
Status: COMPLETED | OUTPATIENT
Start: 2019-07-03 | End: 2019-07-03

## 2019-07-03 RX ORDER — METOPROLOL SUCCINATE 50 MG/1
50 TABLET, EXTENDED RELEASE ORAL DAILY
Qty: 30 TAB | Refills: 6 | Status: SHIPPED | OUTPATIENT
Start: 2019-07-03 | End: 2019-07-09 | Stop reason: SDUPTHER

## 2019-07-03 RX ORDER — POTASSIUM CHLORIDE 20 MEQ/1
40 TABLET, EXTENDED RELEASE ORAL DAILY
Status: DISCONTINUED | OUTPATIENT
Start: 2019-07-04 | End: 2019-07-03 | Stop reason: HOSPADM

## 2019-07-03 RX ORDER — ROSUVASTATIN CALCIUM 40 MG/1
40 TABLET, COATED ORAL
Qty: 30 TAB | Refills: 6 | Status: SHIPPED | OUTPATIENT
Start: 2019-07-03 | End: 2019-07-09 | Stop reason: SDUPTHER

## 2019-07-03 RX ADMIN — Medication 10 ML: at 05:52

## 2019-07-03 RX ADMIN — ASPIRIN 81 MG 81 MG: 81 TABLET ORAL at 08:37

## 2019-07-03 RX ADMIN — GLIMEPIRIDE 2 MG: 2 TABLET ORAL at 08:35

## 2019-07-03 RX ADMIN — POTASSIUM CHLORIDE 40 MEQ: 20 TABLET, EXTENDED RELEASE ORAL at 10:04

## 2019-07-03 RX ADMIN — FAMOTIDINE 40 MG: 20 TABLET ORAL at 08:38

## 2019-07-03 RX ADMIN — RANOLAZINE 500 MG: 500 TABLET, FILM COATED, EXTENDED RELEASE ORAL at 08:37

## 2019-07-03 RX ADMIN — CLOPIDOGREL BISULFATE 75 MG: 75 TABLET ORAL at 08:37

## 2019-07-03 RX ADMIN — POTASSIUM CHLORIDE 40 MEQ: 20 TABLET, EXTENDED RELEASE ORAL at 08:36

## 2019-07-03 NOTE — PROGRESS NOTES
Discharge instructions were reviewed with patient. An opportunity was given for questions. All medications were reviewed, and information was given on the new medications - plavix, toprol XL, nitrostat, potassium, crestor. Patient verbalized understanding, and has no questions at this time. IV and telemetry monitor removed by primary RN.

## 2019-07-03 NOTE — PROGRESS NOTES
Verbal bedside report received from April, RN. Assumed care of patient. R radial site assessed  at bedside with outgoing RN.

## 2019-07-03 NOTE — DISCHARGE SUMMARY
Physician Discharge Summary     Patient ID:  Cory Wheeler  [id number]  51 y.o.  1970    Admit date: 6/30/2019    Discharge date and time: 7/3/19    Admitting Physician: Jevon Whatley MD     Primary Cardiologist:Dr. Yeimy Richardson     Primary Care MD:Shilpi Foss MD    Discharge Physician: Shalom Benton NP    Admission Diagnoses: STEMI (ST elevation myocardial infarction) [facility]) [I21.3]  Acute inferior myocardial infarction [facility]) [I21.19]    Discharge Diagnoses:   Patient Active Problem List    Diagnosis Date Noted    Hypokalemia 07/03/2019    Acute inferior myocardial infarction [facility]) 07/01/2019    STEMI (ST elevation myocardial infarction) ([facility] [address] 06/30/2019    RONNIE (obstructive sleep apnea) 10/04/2016    Obesity (BMI 30-39.9) 10/04/2016    Essential hypertension with goal blood pressure less than 140/90 05/17/2016    Dyslipidemia 05/17/2016    Abnormal stress test 05/17/2016    Type 2 diabetes mellitus without complication ([facility] [address] 99/38/2700    Chest pressure 04/15/2016    Diabetes mellitus type 2, controlled ([facility] [address] 04/15/2016           Hospital Course: Cory Wheeler was admitted to the hospital on 6/30/2019 with complaints of new onset chest pain. ECG consistent for IMI. She was taken to cardiac cath lab and found to have the following:    PROCEDURES PERFORMED:  Left heart cath with coronary angiography, aspiration catheter to the distal right coronary artery, bifurcation V-stenting of the posterior descending and posterolateral branches of the right coronary using [place] drug-eluting stents.     PREOPERATIVE DIAGNOSIS:  Acute inferior myocardial infarction.     POSTOPERATIVE DIAGNOSIS:   Acute inferior myocardial infarction.     SURGEON:  Benson Mendoza MD     ASSISTANT:  None.     ESTIMATED BLOOD LOSS:  Less than 5 mL.     SPECIMENS REMOVED:  None.     COMPLICATIONS:  None.     IMPLANTS:  See below.     ANESTHESIA:  Conscious sedation.   The patient was sedated by Chrystal Bustos RN with a frailty score of 4 using a total of 2 mg of Versed, 100 mcg fentanyl and monitored from 11:53 p.m. until 12:51 a.m.     TOTAL CONTRAST:  265 mL of Isovue.     PROCEDURE TECHNIQUE:  The patient was urgently brought from the emergency room to the cath lab, prepped and draped in the usual fashion. A 6-[place] sheath was placed in the right radial artery via the micropuncture modified Seldinger technique and left heart catheterization performed using standard 5-[place] Tiger and 6-[place] JR-4 guiding catheter for the right coronary injection. Ventriculogram was not performed to conserve contrast.  Heparin and Aggrastat were used for the intervention with a final ACT of 307 seconds. 180 mg of Brilinta was administered. The patient had aspirin in the Emergency Department and prior to arrival as well at home. Manual pressure will be applied to the patient's access site via TR band protocol.     PRESSURE RESULTS:  Aorta 140/75.     Left ventriculogram not performed to conserve contrast.     CORONARY ANATOMY:  The left main has mild irregularity dividing into an LAD and circumflex in the usual fashion.     The LAD has diffuse moderate disease extending from the mid into the distal vessel. At the takeoff of a small second diagonal branch, the distal LAD is flush occluded. This is a chronic finding and was unable to be crossed in 2017. The proximal LAD has mild irregularity but there is diffuse midvessel disease up to about 50%. There are two diagonal branches, both of which have moderate proximal irregularity of 30-40% .    The circumflex is a fairly large, but nondominant system which gives off a large first obtuse marginal branch. The entire circumflex distribution has smooth disease up to about 20% utmost.     The right coronary is a large dominant vessel supplying a large posterior descending and a moderate size posterolateral branch.   The right coronary proper has mild irregularities with a focal 20% proximal narrowing. There is critical bifurcation disease with sluggish flow in both the posterior descending and posterolateral branches. There is an ostial 80% hazy stenosis in the posterior descending branch and a 99% thrombotic lesion in the posterolateral ostium.     PERCUTANEOUS INTERVENTION REPORT:  After systemic anticoagulation with heparin and initiation of Aggrastat and verification of therapeutic ACT, a [place] wire was advanced into the posterolateral branch and a [place] into the posterior descending branch. We aspirated the posterolateral branch with an [place] catheter and then sequentially dilated with a 2.5-mm compliant balloon to nominal atmospheres in the posterolateral ostium and then the posterior descending ostium. There was marked improvement in flow and marked reduction in stenosis in both vessels. We then performed a V-stent deploying a 2.5 x 15 mm [place] in the posterolateral and a 2.75 x 30 mm [place] in the posterior descending branch. Both stents were deployed initially to nominal atmospheres and then inflated to 14 atmospheres. We postdilated with a 2.75-mm noncompliant balloon in the posterior descending and a 2.5-mm noncompliant balloon in the posterolateral branch, but the posterolateral ostium appeared underexpanded. We kissed the final time with two 2.75-mm noncompliant balloons at the ostium of both vessels and in the distal right coronary proper, both to 16 atmospheres. There was good stent expansion and apposition with less than 10% residual stenosis, no dissection and SCAR III flow in both large vessels. The procedure was terminated.     CONCLUSIONS:  1. Complex bifurcation stenting in the distal right coronary bifurcation with [place] drug-eluting stents extending into the proximal portion of both the posterior descending and posterolateral branches. 2.  Chronically occluded apical LAD. 3.  Ventricular function will be assessed by echocardiography in the morning.   4. Recommend aggressive risk factor modification in the outpatient setting.     ------------------------------------------------------------------------------------------------------------------------------------------------    Echocardiogram post infarct noted mild reduction in EF. She will continue on cozaar, Bystolic was switched to Toprol. She was started on DAPT of asa and plavix. Her crestor was increased. She was referred to cardiac rehab. She was noted to be hypokalemic and replacement and follow up labs have been ordered. She was seen and evaluated by Dr. Carter Liu this am and felt to be acceptable for discharge. It was discussed with patient the importance of dual platelet therapy, to take this every day and monitor stools for signs and symptoms of GI bleed. Report to us or PCP any dark tarry stools or polly blood in stool. DISPOSITION: The patient is being discharged to home on a low saturated fat, low cholesterol diet. Pt is instructed to abstain from any heavy lifting, straining, stooping or driving for 5 days. Pt is instructed to watch groin site ( if groin access was performed) for bleeding/oozing. If so,pt is instructed to apply firm pressure with clean cloth and call office at [phone]. Pt is instructed to watch for signs of infection which include increasing area of redness around site, fever/hot to touch or purulent drainage. Pt is instructed not to soak in a tub bath for 1 week, but it is okay to shower. Discharge Exam:     Visit Vitals  /82   Pulse 91   Temp 97.4 °F (36.3 °C)   Resp 17   Ht 5' 7\" (1.702 m)   Wt 87.7 kg (193 lb 5 oz)   SpO2 98%   BMI 30.28 kg/m²     General Appearance:  Well developed, well nourished,alert and oriented x 3, and individual in no acute distress. Ears/Nose/Mouth/Throat:   Hearing grossly normal.         Neck: Supple. Chest:   Lungs clear to auscultation bilaterally. Cardiovascular:  Regular rate and rhythm, S1, S2 normal, no murmur. Abdomen:   Soft, non-tender, bowel sounds are active. Extremities: No edema bilaterally. Skin: Warm and dry. Final Laboratory Data:  Recent Results (from the past 24 hour(s))   GLUCOSE, POC    Collection Time: 07/02/19 11:13 AM   Result Value Ref Range    Glucose (POC) 189 (H) 65 - 100 mg/dL   GLUCOSE, POC    Collection Time: 07/02/19  4:16 PM   Result Value Ref Range    Glucose (POC) 157 (H) 65 - 100 mg/dL   GLUCOSE, POC    Collection Time: 07/02/19  9:00 PM   Result Value Ref Range    Glucose (POC) 193 (H) 65 - 304 mg/dL   METABOLIC PANEL, BASIC    Collection Time: 07/03/19  4:30 AM   Result Value Ref Range    Sodium 140 136 - 145 mmol/L    Potassium 3.0 (L) 3.5 - 5.1 mmol/L    Chloride 101 98 - 107 mmol/L    CO2 31 21 - 32 mmol/L    Anion gap 8 7 - 16 mmol/L    Glucose 116 (H) 65 - 100 mg/dL    BUN 19 6 - 23 MG/DL    Creatinine 0.68 0.6 - 1.0 MG/DL    GFR est AA >60 >60 ml/min/1.73m2    GFR est non-AA >60 >60 ml/min/1.73m2    Calcium 8.7 8.3 - 10.4 MG/DL   CBC WITH AUTOMATED DIFF    Collection Time: 07/03/19  4:30 AM   Result Value Ref Range    WBC 14.2 (H) 4.3 - 11.1 K/uL    RBC 5.52 (H) 4.05 - 5.2 M/uL    HGB 16.1 (H) 11.7 - 15.4 g/dL    HCT 46.7 (H) 35.8 - 46.3 %    MCV 84.6 79.6 - 97.8 FL    MCH 29.2 26.1 - 32.9 PG    MCHC 34.5 31.4 - 35.0 g/dL    RDW 12.6 11.9 - 14.6 %    PLATELET 322 221 - 483 K/uL    MPV 12.6 (H) 9.4 - 12.3 FL    ABSOLUTE NRBC 0.00 0.0 - 0.2 K/uL    DF AUTOMATED      NEUTROPHILS 68 43 - 78 %    LYMPHOCYTES 22 13 - 44 %    MONOCYTES 8 4.0 - 12.0 %    EOSINOPHILS 2 0.5 - 7.8 %    BASOPHILS 0 0.0 - 2.0 %    IMMATURE GRANULOCYTES 0 0.0 - 5.0 %    ABS. NEUTROPHILS 9.6 (H) 1.7 - 8.2 K/UL    ABS. LYMPHOCYTES 3.1 0.5 - 4.6 K/UL    ABS. MONOCYTES 1.2 0.1 - 1.3 K/UL    ABS. EOSINOPHILS 0.2 0.0 - 0.8 K/UL    ABS. BASOPHILS 0.0 0.0 - 0.2 K/UL    ABS. IMM.  GRANS. 0.1 0.0 - 0.5 K/UL   GLUCOSE, POC    Collection Time: 07/03/19  6:25 AM   Result Value Ref Range    Glucose (POC) 117 (H) 65 - 100 mg/dL       Disposition: home    Patient Instructions:   Current Discharge Medication List      START taking these medications    Details   clopidogrel (PLAVIX) 75 mg tab Take 1 Tab by mouth daily. Qty: 30 Tab, Refills: 11      metoprolol succinate (TOPROL-XL) 50 mg XL tablet Take 1 Tab by mouth daily. Qty: 30 Tab, Refills: 6      nitroglycerin (NITROSTAT) 0.4 mg SL tablet 1 Tab by SubLINGual route every five (5) minutes as needed for Chest Pain. Up to 3 doses. Qty: 1 Bottle, Refills: 3      potassium chloride (K-DUR, KLOR-CON) 20 mEq tablet Take 1 Tab by mouth daily. Qty: 30 Tab, Refills: 6         CONTINUE these medications which have CHANGED    Details   rosuvastatin (CRESTOR) 40 mg tablet Take 1 Tab by mouth nightly. Qty: 30 Tab, Refills: 6         CONTINUE these medications which have NOT CHANGED    Details   semaglutide (OZEMPIC) 0.25 mg/0.2 mL (2 mg/1.5 mL) sub-q pen 0.25 mg by SubCUTAneous route every seven (7) days. insulin lispro (HUMALOG KWIKPEN INSULIN SC) 3 Units by SubCUTAneous route Before breakfast, lunch, and dinner. gabapentin (NEURONTIN) 300 mg capsule Take 300 mg by mouth. fluticasone (FLONASE) 50 mcg/actuation nasal spray 2 sprays by Both Nostrils route as needed for Rhinitis. losartan (COZAAR) 50 mg tablet Take 1 Tab by mouth daily. Qty: 90 Tab, Refills: 3      cetirizine (ZYRTEC) 10 mg tablet Take 10 mg by mouth. cholecalciferol, vitamin D3, 4,000 unit cap Take 8,000 Units by mouth. NOVOLOG FLEXPEN U-100 INSULIN 100 unit/mL inpn       TOUJEO SOLOSTAR U-300 INSULIN 300 unit/mL (1.5 mL) inpn       dulaglutide (TRULICITY) 7.78 PQ/2.5 mL sub-q pen 0.75 mg by SubCUTAneous route every seven (7) days. aspirin delayed-release 81 mg tablet Take 81 mg by mouth every evening. !! venlafaxine-SR (EFFEXOR-XR) 37.5 mg capsule Take 37.5 mg by mouth every evening.  With the 150mg   Refills: 5      raNITIdine (ZANTAC) 150 mg tablet Take 150 mg by mouth daily as needed. albuterol (PROVENTIL VENTOLIN) 2.5 mg /3 mL (0.083 %) nebulizer solution 1 vial via nebulizer qid  Qty: 120 Each, Refills: 11      ranolazine ER (RANEXA) 500 mg SR tablet Take 1 Tab by mouth two (2) times a day. Qty: 180 Tab, Refills: 3      levalbuterol tartrate (XOPENEX HFA) 45 mcg/actuation inhaler Take 2 Puffs by inhalation every six (6) hours as needed for Wheezing. Qty: 1 Inhaler, Refills: 5      glimepiride (AMARYL) 2 mg tablet Take 2 mg by mouth two (2) times a day. chlorthalidone (HYGROTEN) 25 mg tablet Take 25 mg by mouth every evening. Indications: hypertension      LORazepam (ATIVAN) 0.5 mg tablet Take 0.5 mg by mouth every four (4) hours as needed for Anxiety. !! venlafaxine-SR (EFFEXOR XR) 150 mg capsule Take 150 mg by mouth every evening. Takes with 37.5 mg nightlly  Indications: Generalized Anxiety Disorder       !! - Potential duplicate medications found. Please discuss with provider. STOP taking these medications       nebivolol (BYSTOLIC) 10 mg tablet Comments:   Reason for Stopping:               Referenced discharge instructions provided by nursing for diet and activity. Follow-up:  Primary CardiologistDr. Guy Perez in one week   PCP: (Yoav Persaud MD) in about 4 weeks.     Signed:  Maryjo Mcguire NP  7/3/2019  8:46 AM

## 2019-07-03 NOTE — PROGRESS NOTES
Cardiac Rehab: Spoke with patient regarding referral to cardiac rehab. Patient meets admission criteria based on STEMI with PCI(7/1/19). Written information about Cardiac Rehab given and reviewed with patient. Discussed lifestyle modifications to promote cardiac wellness. Patient indicated that she wants to participate in the cardiac rehab program and her orientation has been scheduled. Her Cardiologist is Dr. Blu Nelson.       Thank you,  MARY KAY Wilson, RN  Cardiopulmonary Rehabilitation Nurse Liaison  [facility]

## 2019-07-03 NOTE — DISCHARGE INSTRUCTIONS
DISPOSITION: The patient is being discharged to home on a low saturated fat, low cholesterol diet. Pt is instructed to abstain from any heavy lifting, straining, stooping or driving for 5 days. Pt is instructed to watch groin site ( if groin access was performed) for bleeding/oozing. If so,pt is instructed to apply firm pressure with clean cloth and call office at [phone]. Pt is instructed to watch for signs of infection which include increasing area of redness around site, fever/hot to touch or purulent drainage. Pt is instructed not to soak in a tub bath for 1 week, but it is okay to shower. Heart Attack: Care Instructions  Your Care Instructions    A heart attack (myocardial infarction, or MI) occurs when one or more of the coronary arteries, which supply the heart with oxygen-rich blood, is blocked. A blockage usually occurs when plaque inside the artery breaks open and a blood clot forms in the artery. After a heart attack, you may be worried about your future. Over the next several weeks, your heart will start to heal. Though it can be hard to break old habits, you can prevent another heart attack by making some lifestyle changes and by taking medicines. You may use this information for ideas about what to do at home to speed your recovery. Follow-up care is a key part of your treatment and safety. Be sure to make and go to all appointments, and call your doctor if you are having problems. It's also a good idea to know your test results and keep a list of the medicines you take. How can you care for yourself at home? Activity    · Until your doctor says it is okay, do not do strenuous exercise. And do not lift, pull, or push anything heavy. Ask your doctor what types of activities are safe for you.     · If your doctor has not set you up with a cardiac rehabilitation (rehab) program, talk to him or her about whether that is right for you. Cardiac rehab includes supervised exercise.  It also includes help with diet and lifestyle changes and emotional support. It may reduce your risk of future heart problems.     · Increase your activities slowly. Take short rest breaks when you get tired.     · If your doctor recommends it, get more exercise. Walking is a good choice. Bit by bit, increase the amount you walk every day. Try for at least 30 minutes on most days of the week. You also may want to swim, bike, or do other activities. Talk with your doctor before you start an exercise program to make sure it is safe for you.     · Ask your doctor when you can drive, go back to work, and do other daily activities again.     · You can have sex as soon as you feel ready for it. Often this means when you can easily walk around or climb stairs. Talk with your doctor if you have any concerns. If you are taking nitroglycerin, do not take erection-enhancing medicine such as sildenafil (Viagra), tadalafil (Cialis), or vardenafil (Levitra) .    Lifestyle changes    · Do not smoke. Smoking increases your risk of another heart attack. If you need help quitting, talk to your doctor about stop-smoking programs and medicines. These can increase your chances of quitting for good.     · Eat a heart-healthy diet that is low in saturated fat and salt, and is full of fruits, vegetables and whole-grains. Eat at least two servings of fish each week. You may get more details about how to eat healthy. But these tips can help you get started.     · Stay at a healthy weight, or lose weight if you need to. Medicines    · Be safe with medicines. Take your medicines exactly as prescribed. Call your doctor if you think you are having a problem with your medicine. You will get more details on the specific medicines your doctor prescribes. Do not stop taking your medicine unless your doctor tells you to.  Not taking your medicine might raise your risk of having another heart attack.     · You may need several medicines to help lower your risk of another heart attack. These include:  ? Blood pressure medicines such as angiotensin-converting enzyme (ACE) inhibitors, ARBs (angiotensin II receptor blockers), and beta-blockers. ? Cholesterol medicine called statins. ? Aspirin and other blood thinners. These prevent blood clots that can cause a heart attack.     · If your doctor has given you nitroglycerin, keep it with you at all times. If you have angina symptoms, such as chest pain or pressure, sit down and rest. Take the first dose of nitroglycerin as directed. If symptoms get worse or are not getting better within 5 minutes, call 911 right away. Stay on the phone. The emergency  will tell you what to do.     · Do not take any over-the-counter medicines, vitamins, or herbal products without talking to your doctor first.    Staying healthy    · Manage other health conditions such as high blood pressure and diabetes.     · Avoid colds and flu. Get a pneumococcal vaccine shot. If you have had one before, ask your doctor whether you need another dose. Get the flu vaccine every year. If you must be around people with colds or flu, wash your hands often.     · Be sure to tell your doctor about any angina symptoms you have had, even if they went away. Pay attention to your angina symptoms. Know what is typical for you and learn how to control it. Know when to call for help.     · Talk to your family, friends, or a counselor about your feelings. It is normal to feel frightened, angry, hopeless, helpless, and even guilty. Talking openly about bad feelings can help you cope. If you have symptoms of depression, talk to your doctor. When should you call for help? Call 911 anytime you think you may need emergency care. For example, call if:    · You have symptoms of a heart attack. These may include:  ? Chest pain or pressure, or a strange feeling in the chest.  ? Sweating. ? Shortness of breath. ? Nausea or vomiting.   ? Pain, pressure, or a strange feeling in the back, neck, jaw, or upper belly or in one or both shoulders or arms. ? Lightheadedness or sudden weakness. ? A fast or irregular heartbeat. After you call 911, the  may tell you to chew 1 adult-strength or 2 to 4 low-dose aspirin. Wait for an ambulance. Do not try to drive yourself.     · You have angina symptoms (such as chest pain or pressure) that do not go away with rest or are not getting better within 5 minutes after you take a dose of nitroglycerin.     · You passed out (lost consciousness).     · You feel like you are having another heart attack.    Call your doctor now or seek immediate medical care if:    · You are having angina symptoms, such as chest pain or pressure, more often than usual, or the symptoms are different or worse than usual.     · You have new or increased shortness of breath.     · You are dizzy or lightheaded, or you feel like you may faint.    Watch closely for changes in your health, and be sure to contact your doctor if you have any problems. Where can you learn more? Go to http://aaron-gege.info/. Enter [id number] in the search box to learn more about \"Heart Attack: Care Instructions. \"  Current as of: July 22, 2018  Content Version: 11.9  © 2050-6008 [employer]. Care instructions adapted under license by [employer] (which disclaims liability or warranty for this information). If you have questions about a medical condition or this instruction, always ask your healthcare professional. [place] [address] any warranty or liability for your use of this information. Reducing Heart Attack Risk With Daily Medicine: Care Instructions  Your Care Instructions    Heart disease is the number one cause of death. If you are at risk for heart disease, there are many medicines that can reduce your risk. These include:  · ACE inhibitors or ARBs. These are types of blood pressure medicines.  They can reduce the risk of heart attacks and strokes if you are at high risk. · Statin medicines. These lower cholesterol. They can also reduce the risk of heart disease and strokes. · Aspirin and other antiplatelets. These prevent blood clots. They can help certain people lower their risk of a heart attack or stroke. · Beta-blocker medicines. These are a type of blood pressure and heart medicine. They can reduce the chance of early death if you have had a heart attack. All medicines can cause side effects. So it is important to understand the pros and cons of any medicine you take. It is also important to take your medicines exactly as your doctor tells you to. Follow-up care is a key part of your treatment and safety. Be sure to make and go to all appointments, and call your doctor if you are having problems. It's also a good idea to know your test results and keep a list of the medicines you take. ACE inhibitors  ACE (angiotensin-converting enzyme) inhibitors are used for three main reasons. They lower blood pressure, protect the kidneys, and prevent heart attacks and strokes. Examples include benazepril (Lotensin), lisinopril (Prinivil, Zestril), and ramipril (Altace). An angiotensin II receptor blocker (ARB) may be used instead of an ACE inhibitor. ARBs help you in the same ways as ACE inhibitors. Examples include candesartan (Atacand), irbesartan (Avapro), and losartan (Cozaar). Before you start taking an ACE inhibitor or an ARB, make sure your doctor knows if:  · You are taking a water pill (diuretic). · You are taking potassium pills or using salt substitutes. · You are pregnant or breastfeeding. · You have had a kidney transplant or other kidney problems. ACE inhibitors and ARBs can cause side effects. Call your doctor right away if you have:  · Trouble breathing. · Swelling in your face, head, neck, or tongue. · Dizziness or lightheadedness. · A dry cough. Statins  Statins lower cholesterol.  Examples include atorvastatin (Lipitor), lovastatin (Mevacor), pravastatin (Pravachol), and simvastatin (Zocor). Before you start taking a statin, make sure your doctor knows if:  · You have had a kidney transplant or other kidney problems. · You have liver disease. · You take any other prescription medicine, over-the-counter medicine, vitamins, supplements, or herbal remedies. · You are pregnant or breastfeeding. Statins can cause side effects. Call your doctor right away if you have:  · New, severe muscle aches. · Brown urine. Aspirin  Taking an aspirin every day can lower your risk for a heart attack. A heart attack occurs when a blood vessel in the heart gets blocked. When this happens, oxygen can't get to the heart muscle, and part of the heart dies. Aspirin can help prevent blood clots that can block the blood vessels. Talk to your doctor before you start taking aspirin every day. He or she may recommend that you take one low-dose aspirin (81 mg) tablet each day, with a meal and a full glass of water. Taking aspirin isn't right for everyone. This is because it can cause serious bleeding. And you may not be able to use aspirin if you:  · Have asthma. · Have an ulcer or other stomach problem. · Take some other medicine (called a blood thinner) that prevents blood clots. · Are allergic to aspirin. Before having a surgery or procedure, tell your doctor or dentist that you take aspirin. He or she will tell you if you should stop taking aspirin beforehand. Make sure that you understand exactly what your doctor wants you to do. Aspirin can cause side effects. Call your doctor right away if you have:  · Unusual bleeding or bruising. · Nausea, vomiting, or heartburn. · Black or bloody stools. Beta-blockers  Beta-blockers are used for three main reasons. They lower blood pressure, relieve angina symptoms (such as chest pain or pressure), and reduce the chances of a second heart attack.  They include atenolol (Tenormin), carvedilol (Coreg), and metoprolol (Lopressor). Before you start taking a beta-blocker, make sure your doctor knows if you have:  · Severe asthma or frequent asthma attacks. · A very slow pulse (less than 55 beats a minute). Beta-blockers can cause side effects. Call your doctor right away if you have:  · Wheezing or trouble breathing. · Dizziness or lightheadedness. · Asthma that gets worse. When should you call for help? Watch closely for changes in your health, and be sure to contact your doctor if you have any problems. Where can you learn more? Go to http://aaron-gege.info/. Enter R428 in the search box to learn more about \"Reducing Heart Attack Risk With Daily Medicine: Care Instructions. \"  Current as of: July 22, 2018  Content Version: 11.9  © 3398-8750 [employer]. Care instructions adapted under license by [employer] (which disclaims liability or warranty for this information). If you have questions about a medical condition or this instruction, always ask your healthcare professional. [address] any warranty or liability for your use of this information. Percutaneous Coronary Intervention: What to Expect at [facility]    Percutaneous coronary intervention (PCI) is the name for procedures that are used to open a narrowed or blocked coronary artery. The two most common PCI procedures are coronary angioplasty and coronary stent placement. Your groin or arm may have a bruise and feel sore for a day or two after a percutaneous coronary intervention (PCI). You can do light activities around the house, but nothing strenuous for several days. This care sheet gives you a general idea about how long it will take for you to recover. But each person recovers at a different pace. Follow the steps below to get better as quickly as possible. How can you care for yourself at home?   Activity    · If the doctor gave you a sedative:  ? For 24 hours, don't do anything that requires attention to detail. It takes time for the medicine's effects to completely wear off.  ? For your safety, do not drive or operate any machinery that could be dangerous. Wait until the medicine wears off and you can think clearly and react easily.     · Do not do strenuous exercise and do not lift, pull, or push anything heavy until your doctor says it is okay. This may be for a day or two. You can walk around the house and do light activity, such as cooking.     · If the catheter was placed in your groin, try not to walk up stairs for the first couple of days.     · If the catheter was placed in your arm near your wrist, do not bend your wrist deeply for the first couple of days. Be careful using your hand to get into and out of a chair or bed.     · Carry your stent identification card with you at all times.     · If your doctor recommends it, get more exercise. Walking is a good choice. Bit by bit, increase the amount you walk every day. Try for at least 30 minutes on most days of the week. Diet    · Drink plenty of fluids to help your body flush out the dye. If you have kidney, heart, or liver disease and have to limit fluids, talk with your doctor before you increase the amount of fluids you drink.     · Keep eating a heart-healthy diet that has lots of fruits, vegetables, and whole grains. If you have not been eating this way, talk to your doctor. You also may want to talk to a dietitian. This expert can help you to learn about healthy foods and plan meals. Medicines    · Your doctor will tell you if and when you can restart your medicines. He or she will also give you instructions about taking any new medicines.     · If you take blood thinners, such as warfarin (Coumadin), clopidogrel (Plavix), or aspirin, be sure to talk to your doctor. He or she will tell you if and when to start taking those medicines again.  Make sure that you understand exactly what your doctor wants you to do.     · Your doctor will prescribe blood-thinning medicines. You will likely take aspirin plus another antiplatelet, such as clopidogrel (Plavix). It is very important that you take these medicines exactly as directed. These medicines help keep the coronary artery open and reduce your risk of a heart attack.     · Call your doctor if you think you are having a problem with your medicine.    Care of the catheter site    · For 1 or 2 days, keep a bandage over the spot where the catheter was inserted. The bandage probably will fall off in this time.     · Put ice or a cold pack on the area for 10 to 20 minutes at a time to help with soreness or swelling. Put a thin cloth between the ice and your skin.     · You may shower 24 to 48 hours after the procedure, if your doctor okays it. Pat the incision dry.     · Do not soak the catheter site until it is healed. Don't take a bath for 1 week, or until your doctor tells you it is okay.     · If you are bleeding, lie down and press on the area for 15 minutes to try to make it stop. If the bleeding does not stop, call your doctor or seek immediate medical care. Follow-up care is a key part of your treatment and safety. Be sure to make and go to all appointments, and call your doctor if you are having problems. It's also a good idea to know your test results and keep a list of the medicines you take. When should you call for help? Call 911 anytime you think you may need emergency care. For example, call if:    · You passed out (lost consciousness).     · You have severe trouble breathing.     · You have sudden chest pain and shortness of breath, or you cough up blood.     · You have symptoms of a heart attack, such as:  ? Chest pain or pressure. ? Sweating. ? Shortness of breath. ? Nausea or vomiting. ? Pain that spreads from the chest to the neck, jaw, or one or both shoulders or arms. ? Dizziness or lightheadedness.   ? A fast or uneven pulse. After calling 911, chew 1 adult-strength aspirin. Wait for an ambulance. Do not try to drive yourself.     · You have been diagnosed with angina, and you have angina symptoms that do not go away with rest or are not getting better within 5 minutes after you take one dose of nitroglycerin.    Call your doctor now or seek immediate medical care if:    · You are bleeding from the area where the catheter was put in your artery.     · You have a fast-growing, painful lump at the catheter site.     · You have signs of infection, such as:  ? Increased pain, swelling, warmth, or redness. ? Red streaks leading from the catheter site. ? Pus draining from the catheter site. ? A fever.     · Your leg or arm looks blue or feels cold, numb, or tingly.    Watch closely for changes in your health, and be sure to contact your doctor if you have any problems. Where can you learn more? Go to http://aaron-gege.info/. Enter P234 in the search box to learn more about \"Percutaneous Coronary Intervention: What to Expect at Home. \"  Current as of: July 22, 2018  Content Version: 11.9  © 5886-3084 [employer]. Care instructions adapted under license by [employer] (which disclaims liability or warranty for this information). If you have questions about a medical condition or this instruction, always ask your healthcare professional. [address] any warranty or liability for your use of this information. Cardiac Catheterization/Angiography Discharge Instructions    *Check the puncture site frequently for swelling or bleeding. If you see any bleeding, lie down and apply pressure over the area with a clean town or washcloth. Notify your doctor for any redness, swelling, drainage or oozing from the puncture site. Notify your doctor for any fever or chills.     *If the leg or arm with the puncture becomes cold, numb or painful, call [facility] at [phone]. *Activity should be limited for the next 48 hours. Climb stairs as little as possible and avoid any stooping, bending or strenuous activity for 48 hours. No heavy lifting (anything over 10 pounds) for three days. *Do not drive for 48 hours. *You may resume your usual diet. Drink more fluids than usual.    *Have a responsible person drive you home and stay with you for at least 24 hours after your heart catheterization/angiography. *You may remove the bandage from your Right Arm in 24 hours. You may shower in 24 hours. No tub baths, hot tubs or swimming for one week. Do not place any lotions, creams, powders, ointments over the puncture site for one week. You may place a clean band-aid over the puncture site each day for 5 days. Change this daily. Heart-Healthy Diet: Care Instructions  Your Care Instructions    A heart-healthy diet has lots of vegetables, fruits, nuts, beans, and whole grains, and is low in salt. It limits foods that are high in saturated fat, such as meats, cheeses, and fried foods. It may be hard to change your diet, but even small changes can lower your risk of heart attack and heart disease. Follow-up care is a key part of your treatment and safety. Be sure to make and go to all appointments, and call your doctor if you are having problems. It's also a good idea to know your test results and keep a list of the medicines you take. How can you care for yourself at home? Watch your portions  · Learn what a serving is. A \"serving\" and a \"portion\" are not always the same thing. Make sure that you are not eating larger portions than are recommended. For example, a serving of pasta is ½ cup. A serving size of meat is 2 to 3 ounces. A 3-ounce serving is about the size of a deck of cards. Measure serving sizes until you are good at [place]" them.  Keep in mind that restaurants often serve portions that are 2 or 3 times the size of one serving. · To keep your energy level up and keep you from feeling hungry, eat often but in smaller portions. · Eat only the number of calories you need to stay at a healthy weight. If you need to lose weight, eat fewer calories than your body burns (through exercise and other physical activity). Eat more fruits and vegetables  · Eat a variety of fruit and vegetables every day. Dark green, deep orange, red, or yellow fruits and vegetables are especially good for you. Examples include spinach, carrots, peaches, and berries. · Keep carrots, celery, and other veggies handy for snacks. Buy fruit that is in season and store it where you can see it so that you will be tempted to eat it. · Cook dishes that have a lot of veggies in them, such as stir-fries and soups. Limit saturated and trans fat  · Read food labels, and try to avoid saturated and trans fats. They increase your risk of heart disease. Trans fat is found in many processed foods such as cookies and crackers. · Use olive or canola oil when you cook. Try cholesterol-lowering spreads, such as Benecol or Take Control. · Bake, broil, grill, or steam foods instead of frying them. · Choose lean meats instead of high-fat meats such as hot dogs and sausages. Cut off all visible fat when you prepare meat. · Eat fish, skinless poultry, and meat alternatives such as soy products instead of high-fat meats. Soy products, such as tofu, may be especially good for your heart. · Choose low-fat or fat-free milk and dairy products. Eat fish  · Eat at least two servings of fish a week. Certain fish, such as salmon and tuna, contain omega-3 fatty acids, which may help reduce your risk of heart attack. Eat foods high in fiber  · Eat a variety of grain products every day. Include whole-grain foods that have lots of fiber and nutrients. Examples of whole-grain foods include oats, whole wheat bread, and brown rice.   · Buy whole-grain breads and cereals, instead of white bread or pastries. Limit salt and sodium  · Limit how much salt and sodium you eat to help lower your blood pressure. · Taste food before you salt it. Add only a little salt when you think you need it. With time, your taste buds will adjust to less salt. · Eat fewer snack items, fast foods, and other high-salt, processed foods. Check food labels for the amount of sodium in packaged foods. · Choose low-sodium versions of canned goods (such as soups, vegetables, and beans). Limit sugar  · Limit drinks and foods with added sugar. These include candy, desserts, and soda pop. Limit alcohol  · Limit alcohol to no more than 2 drinks a day for men and 1 drink a day for women. Too much alcohol can cause health problems. When should you call for help? Watch closely for changes in your health, and be sure to contact your doctor if:    · You would like help planning heart-healthy meals. Where can you learn more? Go to http://[email or web address]-gege.info/. Enter V137 in the search box to learn more about \"Heart-Healthy Diet: Care Instructions. \"  Current as of: July 22, 2018  Content Version: 11.9  © 0395-8411 [employer]. Care instructions adapted under license by [employer] (which disclaims liability or warranty for this information). If you have questions about a medical condition or this instruction, always ask your healthcare professional. [place] [address] any warranty or liability for your use of this information. DISCHARGE SUMMARY from Nurse    PATIENT INSTRUCTIONS:    After general anesthesia or intravenous sedation, for 24 hours or while taking prescription Narcotics:  · Limit your activities  · Do not drive and operate hazardous machinery  · Do not make important personal or business decisions  · Do  not drink alcoholic beverages  · If you have not urinated within 8 hours after discharge, please contact your surgeon on call.     Report the following to your surgeon:  · Excessive pain, swelling, redness or odor of or around the surgical area  · Temperature over 100.5  · Nausea and vomiting lasting longer than 4 hours or if unable to take medications  · Any signs of decreased circulation or nerve impairment to extremity: change in color, persistent  numbness, tingling, coldness or increase pain  · Any questions    What to do at Home:    *  Please give a list of your current medications to your Primary Care Provider. *  Please update this list whenever your medications are discontinued, doses are      changed, or new medications (including over-the-counter products) are added. *  Please carry medication information at all times in case of emergency situations. These are general instructions for a healthy lifestyle:    No smoking/ No tobacco products/ Avoid exposure to second hand smoke  Surgeon General's Warning:  Quitting smoking now greatly reduces serious risk to your health. Obesity, smoking, and sedentary lifestyle greatly increases your risk for illness    A healthy diet, regular physical exercise & weight monitoring are important for maintaining a healthy lifestyle    You may be retaining fluid if you have a history of heart failure or if you experience any of the following symptoms:  Weight gain of 3 pounds or more overnight or 5 pounds in a week, increased swelling in our hands or feet or shortness of breath while lying flat in bed. Please call your doctor as soon as you notice any of these symptoms; do not wait until your next office visit. The discharge information has been reviewed with the patient. The patient verbalized understanding. Discharge medications reviewed with the patient and appropriate educational materials and side effects teaching were provided.   ___________________________________________________________________________________________________________________________________

## 2019-07-03 NOTE — PROGRESS NOTES
Care Management Interventions  PCP Verified by CM: Yes(confirms PCP)  Mode of Transport at Discharge:  Other (see comment)  Transition of Care Consult (CM Consult): Discharge Planning  Discharge Durable Medical Equipment: (glucometer)  Current Support Network: Lives with Spouse, Own Home(lives with spouse, Washington [phone] and daughter, Ashley Blunt [phone])  Confirm Follow Up Transport: Self  Plan discussed with Pt/Family/Caregiver: Yes  Freedom of Choice Offered: Yes  [employer] Information Provided?: (confirms [employer] - able to get rx)  Discharge Location  Discharge Placement: [address]

## 2019-07-03 NOTE — PROGRESS NOTES
[facility] CARDIOLOGY PROGRESS NOTE           7/3/2019 7:54 AM    Admit Date: 6/30/2019      Subjective:   Feeling better, no CP overnight. No nausea. Cath with PCI to RCA, residual apical LAD lesion--medically managed. ROS:  Cardiovascular:  As noted above    Objective:      Vitals:    07/02/19 1731 07/02/19 2038 07/02/19 2355 07/03/19 0517   BP: 111/76 104/75 107/66 103/72   Pulse: (!) 101 (!) 106 100 93   Resp: 18 18 18 15   Temp: 98.7 °F (37.1 °C) 99.4 °F (37.4 °C) 97.9 °F (36.6 °C) 98.3 °F (36.8 °C)   SpO2: 96% 94% 96% 99%   Weight:    87.7 kg (193 lb 5 oz)   Height:           Physical Exam:  General-No Acute Distress  Neck- supple, no JVD  CV- regular rate and rhythm no MRG  Lung- clear bilaterally  Abd- soft, nontender, nondistended  Ext- no edema bilaterally. Skin- warm and dry    Data Review:   Recent Labs     07/03/19  0430 07/02/19  0423  06/30/19  2332    137   < > 140   K 3.0* 3.9   < > 3.2*   MG  --  1.8  --  2.0   BUN 19 19   < > 30*   CREA 0.68 0.77   < > 0.94   * 229*   < > 164*   WBC 14.2* 19.4*  --  14.7*   HGB 16.1* 17.2*  --  16.8*   HCT 46.7* 49.5*  --  49.0*    200  --  240   INR  --   --   --  1.0   TROIQ  --   --   --  0.80*    < > = values in this interval not displayed. Assessment/Plan:     Principal Problem:    STEMI (ST elevation myocardial infarction) ([facility] (6/30/2019)  On DAPT of asa and plavix-s/p pci to RCA--stressed importance. Echo with mild EF reduction --on cozaar and toprol. Instructed on use of NTG sl, consider adding Imdur and/or increase Ranexa to 1000 mg bid. Active Problems:    Diabetes mellitus type 2, controlled ([facility] (4/15/2016)  Improved, control --stressed importance of tight glycemic control       Essential hypertension with goal blood pressure less than 140/90 (5/17/2016)    Controlled on current meds.        Dyslipidemia (5/17/2016)  crestor 20 mg       RONNIE (obstructive sleep apnea) (10/4/2016)          Obesity (BMI 30-39.9) (10/4/2016)  Encouraged appropriate diet and exercise, plan for enrollment in cardiac rehab. Acute inferior myocardial infarction ([facility] [address] (7/1/2019)        Hypokalemia (7/3/2019)  Replacement ordered, Rx daily replacement as this appears to be chronic and will have repeat labs in one week. Likely discharge today.      Haile Green NP  7/3/2019 7:54 AM

## 2019-07-10 ENCOUNTER — HOSPITAL ENCOUNTER (OUTPATIENT)
Dept: CARDIAC REHAB | Age: 49
Discharge: HOME | End: 2019-07-10

## 2019-07-22 ENCOUNTER — APPOINTMENT (OUTPATIENT)
Dept: CARDIAC REHAB | Age: 49
End: 2019-07-22
Payer: COMMERCIAL

## 2019-07-23 ENCOUNTER — HOSPITAL ENCOUNTER (OUTPATIENT)
Dept: CARDIAC REHAB | Age: 49
Discharge: HOME | End: 2019-07-23
Payer: COMMERCIAL

## 2019-07-23 VITALS — WEIGHT: 196.6 LBS | HEIGHT: 67 IN | BODY MASS INDEX: 30.86 KG/M2

## 2019-07-23 PROCEDURE — 93798 PHYS/QHP OP CAR RHAB W/ECG: CPT

## 2019-07-24 ENCOUNTER — APPOINTMENT (OUTPATIENT)
Dept: CARDIAC REHAB | Age: 49
End: 2019-07-24
Payer: COMMERCIAL

## 2019-07-25 ENCOUNTER — APPOINTMENT (OUTPATIENT)
Dept: CARDIAC REHAB | Age: 49
End: 2019-07-25
Payer: COMMERCIAL

## 2019-07-26 ENCOUNTER — HOSPITAL ENCOUNTER (OUTPATIENT)
Dept: CARDIAC REHAB | Age: 49
Discharge: HOME | End: 2019-07-26
Payer: COMMERCIAL

## 2019-07-26 PROCEDURE — 93798 PHYS/QHP OP CAR RHAB W/ECG: CPT

## 2019-07-29 ENCOUNTER — HOSPITAL ENCOUNTER (OUTPATIENT)
Dept: CARDIAC REHAB | Age: 49
Discharge: HOME | End: 2019-07-29
Payer: COMMERCIAL

## 2019-07-29 PROCEDURE — 93798 PHYS/QHP OP CAR RHAB W/ECG: CPT

## 2019-08-02 ENCOUNTER — HOSPITAL ENCOUNTER (OUTPATIENT)
Dept: CARDIAC REHAB | Age: 49
Discharge: HOME | End: 2019-08-02
Payer: COMMERCIAL

## 2019-08-02 PROCEDURE — 93798 PHYS/QHP OP CAR RHAB W/ECG: CPT

## 2019-08-05 ENCOUNTER — APPOINTMENT (OUTPATIENT)
Dept: CARDIAC REHAB | Age: 49
End: 2019-08-05
Payer: COMMERCIAL

## 2019-08-09 ENCOUNTER — APPOINTMENT (OUTPATIENT)
Dept: CARDIAC REHAB | Age: 49
End: 2019-08-09
Payer: COMMERCIAL

## 2019-08-12 ENCOUNTER — HOSPITAL ENCOUNTER (OUTPATIENT)
Dept: CARDIAC REHAB | Age: 49
Discharge: HOME | End: 2019-08-12
Payer: COMMERCIAL

## 2019-08-12 PROCEDURE — 93798 PHYS/QHP OP CAR RHAB W/ECG: CPT

## 2019-08-14 ENCOUNTER — APPOINTMENT (OUTPATIENT)
Dept: CARDIAC REHAB | Age: 49
End: 2019-08-14
Payer: COMMERCIAL

## 2019-08-21 ENCOUNTER — HOSPITAL ENCOUNTER (OUTPATIENT)
Dept: CARDIAC REHAB | Age: 49
Discharge: HOME | End: 2019-08-21
Payer: COMMERCIAL

## 2019-08-21 VITALS — WEIGHT: 198.8 LBS | BODY MASS INDEX: 31.14 KG/M2

## 2019-08-21 PROCEDURE — 93798 PHYS/QHP OP CAR RHAB W/ECG: CPT

## 2019-08-23 ENCOUNTER — APPOINTMENT (OUTPATIENT)
Dept: CARDIAC REHAB | Age: 49
End: 2019-08-23
Payer: COMMERCIAL

## 2019-08-23 ENCOUNTER — HOSPITAL ENCOUNTER (OUTPATIENT)
Dept: CARDIAC REHAB | Age: 49
Discharge: HOME | End: 2019-08-23
Payer: COMMERCIAL

## 2019-08-23 PROCEDURE — 93798 PHYS/QHP OP CAR RHAB W/ECG: CPT

## 2019-08-23 NOTE — PROGRESS NOTES
51year old female s/p STEMI/ PCI on 7/1/19 enrolled in cardiac rehabilitation, seen today for initial nutrition counseling. States fair energy level and appetite today. Pt has had diabetes management classes in the past.     Stated Nutrition Goals: to learn about weight loss and how to improve HDL and lower LD>    Medical History: asthma, CAD, GERD, DM, anxiety, depression, sleep apnea, obesity  Nutrition related medications/supplements: jardiance, humalog, glimepriride,     Nutrition Related Labs:   (H), HDL 33 (L), LDL 87 (H). Blood Sugar Monitoring: pt states she is checking her BS, would like a new glucometer such as the Luna. Social History: Pt is on disability, lives with spouse and daughter. Physical Activity: Rehabilitation 3x per week. Food and Nutrition Intake History:   Main goal is to lose weight. Tells me that she prefers sweetened processed foods such as donuts, cookies, cakes and pies. Eats out 3-4x per week, seldom eats nuts/seeds and has 2-3 cups of f/v per day. Dislikes meats. She would like to learn to cook more. She has a skillet and grill at home and is comfortable using them. Stated 1 day diet recall includes   Breakfast: protein drink (believes it is is premier protein)  Lunch: apple   Snack: protein drink   Dinner: [employer] water  PM Snack: sugar free pop. Beverages: chocolate milk, juice, water. GI Hx: /Digestive Issues: no complaints. Anthropometric Data:  BMI: 31.14 kg/m² BMI class of obese for age < 65  Height: 5' 7\" (1.702 m). Weight: 90.2 kg (198 lb 12.8 oz). -    Waist measurement (inches): 42- places pt at risk for metabolic diseases.      Estimated Nutritional Needs:  Calories: MSJ x 1.2 (sedentary) weight maintenance: 2000kcal/day MSJ x  (-500kcal) for weight loss: 1500kcal/day  Protein: 75g (20% of estimated energy low end needs)  CHO:  169g (45% of estimated low end energy needs)      Stage of Behavior Change: preparation     Nutrition Diagnosis: Imbalanced intake of nutrients related to food choices/ food related knowledge evidenced by food recall     Nutrition Intervention:    1. Nutrition Education: Educated patient and spouse on cardioprotective meal pattern/Mediterranean meal pattern including   Guidelines for saturated fat (<7% total calories), sodium ( < 2000mg/day or follow MD recommendations), and added sugars ( < 25 grams for women/<35 grams for men) and high sources of each reviewed   Consumption of daily fiber intake with emphasis on 7-13 grams of soluble fiber daily and food sources reviewed.  Emphasis and sources of unsaturated fats and specific benefits of omega 3 fatty acids reviewed for cardioprotective benefits.  Emphasis on cardioprotective benefits of daily intake of plant-focused eating pattern.  Demonstrated portions sizes to support weight loss goals   Demonstrated food label reading and food nalini ([employer]) for home use to support self efficacy goals.    2. Reviewed lab/body comp results/goals, and nutrition modifications that will support improvements in body composition and lab values. 3. Goals Setting: Set specific, proximal goals in collaboration with patient, including personalized plan that patient can achieve his/her goals during cardiac rehabilitation. 4. Weight loss strategies reviewed. Handouts provided for home use:    Lab/body comp with values    Heart healthy eating pattern with 1 day sample menu.  Heart healthy recipes, recipe modification tips.  Peerform plate method   Tips for reducing sodium   Weight loss tips    Nutrition Goals:    5-10% weight loss by increasing f/v to 5 servings per day, and decreasing portion sizes at meals, decrease mindless snacking, by end of cardiac/ pulmonary rehabilitation. Monitoring/Evaluation: RD to follow up with participant during rehab sessions for questions and assessment of progression toward goals.     Compliance: Pt agreeable to increase fish and f/v intake  Barriers: None identified at this time     Rachel Montes MS, RD, LD  Cardiac/Pulmonary Rehab Dietitian

## 2019-08-26 ENCOUNTER — APPOINTMENT (OUTPATIENT)
Dept: CARDIAC REHAB | Age: 49
End: 2019-08-26
Payer: COMMERCIAL

## 2019-08-28 ENCOUNTER — HOSPITAL ENCOUNTER (OUTPATIENT)
Dept: CARDIAC REHAB | Age: 49
Discharge: HOME | End: 2019-08-28
Payer: COMMERCIAL

## 2019-08-28 PROCEDURE — 93798 PHYS/QHP OP CAR RHAB W/ECG: CPT

## 2019-08-30 ENCOUNTER — APPOINTMENT (OUTPATIENT)
Dept: CARDIAC REHAB | Age: 49
End: 2019-08-30
Payer: COMMERCIAL

## 2019-09-04 ENCOUNTER — APPOINTMENT (OUTPATIENT)
Dept: CARDIAC REHAB | Age: 49
End: 2019-09-04
Payer: COMMERCIAL

## 2019-09-09 ENCOUNTER — HOSPITAL ENCOUNTER (OUTPATIENT)
Dept: CARDIAC REHAB | Age: 49
End: 2019-09-09
Payer: COMMERCIAL

## 2019-09-11 ENCOUNTER — HOSPITAL ENCOUNTER (OUTPATIENT)
Dept: CARDIAC REHAB | Age: 49
Discharge: HOME | End: 2019-09-11
Payer: COMMERCIAL

## 2019-09-13 ENCOUNTER — HOSPITAL ENCOUNTER (OUTPATIENT)
Dept: CARDIAC REHAB | Age: 49
Discharge: HOME | End: 2019-09-13
Payer: COMMERCIAL

## 2019-09-13 PROCEDURE — 93798 PHYS/QHP OP CAR RHAB W/ECG: CPT

## 2019-09-16 ENCOUNTER — HOSPITAL ENCOUNTER (OUTPATIENT)
Dept: CARDIAC REHAB | Age: 49
End: 2019-09-16
Payer: COMMERCIAL

## 2019-09-18 ENCOUNTER — HOSPITAL ENCOUNTER (OUTPATIENT)
Dept: CARDIAC REHAB | Age: 49
Discharge: HOME | End: 2019-09-18
Payer: COMMERCIAL

## 2019-09-18 VITALS — BODY MASS INDEX: 30.79 KG/M2 | WEIGHT: 196.6 LBS

## 2019-09-18 PROCEDURE — 93798 PHYS/QHP OP CAR RHAB W/ECG: CPT

## 2019-09-20 ENCOUNTER — APPOINTMENT (OUTPATIENT)
Dept: CARDIAC REHAB | Age: 49
End: 2019-09-20
Payer: COMMERCIAL

## 2019-09-23 ENCOUNTER — APPOINTMENT (OUTPATIENT)
Dept: CARDIAC REHAB | Age: 49
End: 2019-09-23
Payer: COMMERCIAL

## 2019-09-25 ENCOUNTER — APPOINTMENT (OUTPATIENT)
Dept: CARDIAC REHAB | Age: 49
End: 2019-09-25
Payer: COMMERCIAL

## 2019-09-30 ENCOUNTER — HOSPITAL ENCOUNTER (OUTPATIENT)
Dept: CARDIAC REHAB | Age: 49
End: 2019-09-30
Payer: COMMERCIAL

## 2019-10-02 ENCOUNTER — HOSPITAL ENCOUNTER (OUTPATIENT)
Dept: CARDIAC REHAB | Age: 49
Discharge: HOME | End: 2019-10-02

## 2019-10-04 ENCOUNTER — APPOINTMENT (OUTPATIENT)
Dept: CARDIAC REHAB | Age: 49
End: 2019-10-04

## 2019-10-07 ENCOUNTER — APPOINTMENT (OUTPATIENT)
Dept: CARDIAC REHAB | Age: 49
End: 2019-10-07

## 2019-10-09 ENCOUNTER — APPOINTMENT (OUTPATIENT)
Dept: CARDIAC REHAB | Age: 49
End: 2019-10-09

## 2019-10-11 ENCOUNTER — HOSPITAL ENCOUNTER (OUTPATIENT)
Dept: CARDIAC REHAB | Age: 49
Discharge: HOME | End: 2019-10-11

## 2020-07-10 ENCOUNTER — HOSPITAL ENCOUNTER (OUTPATIENT)
Dept: LAB | Age: 50
Discharge: HOME | End: 2020-07-10
Attending: INTERNAL MEDICINE
Payer: COMMERCIAL

## 2020-07-10 DIAGNOSIS — R07.9 CHEST PAIN, UNSPECIFIED TYPE: ICD-10-CM

## 2020-07-10 LAB — TROPONIN-HIGH SENSITIVITY: 4.2 PG/ML (ref 0–14)

## 2020-07-10 PROCEDURE — 84484 ASSAY OF TROPONIN QUANT: CPT

## 2020-07-10 PROCEDURE — 36415 COLL VENOUS BLD VENIPUNCTURE: CPT

## 2021-05-18 VITALS
HEART RATE: 108 BPM | DIASTOLIC BLOOD PRESSURE: 93 MMHG | OXYGEN SATURATION: 95 % | BODY MASS INDEX: 30.29 KG/M2 | HEIGHT: 67 IN | SYSTOLIC BLOOD PRESSURE: 136 MMHG | WEIGHT: 193 LBS | TEMPERATURE: 98.1 F | RESPIRATION RATE: 18 BRPM

## 2021-05-18 LAB
BASOPHILS # BLD: 0.1 K/UL (ref 0–0.2)
BASOPHILS NFR BLD: 1 % (ref 0–2)
DIFFERENTIAL METHOD BLD: ABNORMAL
EOSINOPHIL # BLD: 0.2 K/UL (ref 0–0.8)
EOSINOPHIL NFR BLD: 1 % (ref 0.5–7.8)
ERYTHROCYTE [DISTWIDTH] IN BLOOD BY AUTOMATED COUNT: 12.5 % (ref 11.9–14.6)
HCT VFR BLD AUTO: 47.5 % (ref 35.8–46.3)
HGB BLD-MCNC: 16.5 G/DL (ref 11.7–15.4)
IMM GRANULOCYTES # BLD AUTO: 0.1 K/UL (ref 0–0.5)
IMM GRANULOCYTES NFR BLD AUTO: 0 % (ref 0–5)
LYMPHOCYTES # BLD: 3 K/UL (ref 0.5–4.6)
LYMPHOCYTES NFR BLD: 22 % (ref 13–44)
MCH RBC QN AUTO: 29.2 PG (ref 26.1–32.9)
MCHC RBC AUTO-ENTMCNC: 34.7 G/DL (ref 31.4–35)
MCV RBC AUTO: 84.1 FL (ref 79.6–97.8)
MONOCYTES # BLD: 1 K/UL (ref 0.1–1.3)
MONOCYTES NFR BLD: 7 % (ref 4–12)
NEUTS SEG # BLD: 9.3 K/UL (ref 1.7–8.2)
NEUTS SEG NFR BLD: 68 % (ref 43–78)
NRBC # BLD: 0 K/UL (ref 0–0.2)
PLATELET # BLD AUTO: 214 K/UL (ref 150–450)
PMV BLD AUTO: 12 FL (ref 9.4–12.3)
RBC # BLD AUTO: 5.65 M/UL (ref 4.05–5.2)
WBC # BLD AUTO: 13.5 K/UL (ref 4.3–11.1)

## 2021-05-18 PROCEDURE — 80053 COMPREHEN METABOLIC PANEL: CPT

## 2021-05-18 PROCEDURE — 85025 COMPLETE CBC W/AUTO DIFF WBC: CPT

## 2021-05-18 PROCEDURE — 96374 THER/PROPH/DIAG INJ IV PUSH: CPT

## 2021-05-18 PROCEDURE — 93005 ELECTROCARDIOGRAM TRACING: CPT

## 2021-05-18 PROCEDURE — 84484 ASSAY OF TROPONIN QUANT: CPT

## 2021-05-18 PROCEDURE — 96375 TX/PRO/DX INJ NEW DRUG ADDON: CPT

## 2021-05-18 PROCEDURE — 83690 ASSAY OF LIPASE: CPT

## 2021-05-18 PROCEDURE — 83735 ASSAY OF MAGNESIUM: CPT

## 2021-05-18 PROCEDURE — 99283 EMERGENCY DEPT VISIT LOW MDM: CPT

## 2021-05-18 RX ORDER — SODIUM CHLORIDE 0.9 % (FLUSH) 0.9 %
5-10 SYRINGE (ML) INJECTION AS NEEDED
Status: DISCONTINUED | OUTPATIENT
Start: 2021-05-18 | End: 2021-05-19 | Stop reason: HOSPADM

## 2021-05-18 RX ORDER — SODIUM CHLORIDE 0.9 % (FLUSH) 0.9 %
5-10 SYRINGE (ML) INJECTION EVERY 8 HOURS
Status: DISCONTINUED | OUTPATIENT
Start: 2021-05-18 | End: 2021-05-19 | Stop reason: HOSPADM

## 2021-05-19 ENCOUNTER — APPOINTMENT (OUTPATIENT)
Dept: GENERAL RADIOLOGY | Age: 51
End: 2021-05-19
Attending: EMERGENCY MEDICINE
Payer: COMMERCIAL

## 2021-05-19 ENCOUNTER — HOSPITAL ENCOUNTER (EMERGENCY)
Age: 51
Discharge: HOME | End: 2021-05-19
Attending: EMERGENCY MEDICINE
Payer: COMMERCIAL

## 2021-05-19 DIAGNOSIS — R07.89 ATYPICAL CHEST PAIN: Primary | ICD-10-CM

## 2021-05-19 LAB
ALBUMIN SERPL-MCNC: 3.7 G/DL (ref 3.5–5)
ALBUMIN/GLOB SERPL: 0.9 {RATIO} (ref 1.2–3.5)
ALP SERPL-CCNC: 76 U/L (ref 50–136)
ALT SERPL-CCNC: 45 U/L (ref 12–65)
ANION GAP SERPL CALC-SCNC: 8 MMOL/L (ref 7–16)
AST SERPL-CCNC: 34 U/L (ref 15–37)
ATRIAL RATE: 107 BPM
BILIRUB SERPL-MCNC: 0.3 MG/DL (ref 0.2–1.1)
BUN SERPL-MCNC: 18 MG/DL (ref 6–23)
CALCIUM SERPL-MCNC: 8.7 MG/DL (ref 8.3–10.4)
CALCULATED P AXIS, ECG09: 70 DEGREES
CALCULATED R AXIS, ECG10: 21 DEGREES
CALCULATED T AXIS, ECG11: 65 DEGREES
CHLORIDE SERPL-SCNC: 101 MMOL/L (ref 98–107)
CO2 SERPL-SCNC: 31 MMOL/L (ref 21–32)
CREAT SERPL-MCNC: 0.9 MG/DL (ref 0.6–1)
DIAGNOSIS, 93000: NORMAL
GLOBULIN SER CALC-MCNC: 3.9 G/DL (ref 2.3–3.5)
GLUCOSE SERPL-MCNC: 197 MG/DL (ref 65–100)
LIPASE SERPL-CCNC: 189 U/L (ref 73–393)
MAGNESIUM SERPL-MCNC: 2 MG/DL (ref 1.8–2.4)
P-R INTERVAL, ECG05: 148 MS
POTASSIUM SERPL-SCNC: 3.1 MMOL/L (ref 3.5–5.1)
PROT SERPL-MCNC: 7.6 G/DL (ref 6.3–8.2)
Q-T INTERVAL, ECG07: 366 MS
QRS DURATION, ECG06: 102 MS
QTC CALCULATION (BEZET), ECG08: 488 MS
SODIUM SERPL-SCNC: 140 MMOL/L (ref 136–145)
TROPONIN-HIGH SENSITIVITY: 5.8 PG/ML (ref 0–14)
TROPONIN-HIGH SENSITIVITY: 6 PG/ML (ref 0–14)
VENTRICULAR RATE, ECG03: 107 BPM

## 2021-05-19 PROCEDURE — 74011250636 HC RX REV CODE- 250/636: Performed by: EMERGENCY MEDICINE

## 2021-05-19 PROCEDURE — 84484 ASSAY OF TROPONIN QUANT: CPT

## 2021-05-19 PROCEDURE — 74011250637 HC RX REV CODE- 250/637

## 2021-05-19 PROCEDURE — 71045 X-RAY EXAM CHEST 1 VIEW: CPT

## 2021-05-19 RX ORDER — LORAZEPAM 1 MG/1
TABLET ORAL
Status: COMPLETED
Start: 2021-05-19 | End: 2021-05-19

## 2021-05-19 RX ORDER — MORPHINE SULFATE 4 MG/ML
4 INJECTION INTRAVENOUS
Status: COMPLETED | OUTPATIENT
Start: 2021-05-19 | End: 2021-05-19

## 2021-05-19 RX ORDER — LORAZEPAM 1 MG/1
1 TABLET ORAL
Status: DISCONTINUED | OUTPATIENT
Start: 2021-05-19 | End: 2021-05-19 | Stop reason: HOSPADM

## 2021-05-19 RX ORDER — ONDANSETRON 2 MG/ML
4 INJECTION INTRAMUSCULAR; INTRAVENOUS
Status: COMPLETED | OUTPATIENT
Start: 2021-05-19 | End: 2021-05-19

## 2021-05-19 RX ADMIN — LORAZEPAM: 1 TABLET ORAL at 03:07

## 2021-05-19 RX ADMIN — MORPHINE SULFATE 4 MG: 4 INJECTION INTRAVENOUS at 00:58

## 2021-05-19 RX ADMIN — ONDANSETRON 4 MG: 2 INJECTION INTRAMUSCULAR; INTRAVENOUS at 00:58
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
## 2021-05-19 NOTE — ED TRIAGE NOTES
Arrives ambulatory from home reporting chest pain radiating up into neck and jaw bilaterally since approx 1730. States thought it was her anxiety so took an Ativan with no relief. Then drank a \"White Russian\" at dinner with no relief. Also took NTG SL x 3 without relief. Pain increases with deep inspiration. Slight shob \"when breathing in deep; fullness feeling in neck/shoulder area. Denies nausea or diaphoresis.

## 2021-05-19 NOTE — DISCHARGE INSTRUCTIONS
Your testing done tonight shows no signs of any serious or life-threatening illnesses  Follow-up with your primary care physician as well as your cardiologist  Return to the ER for any new, worsening or life-threatening symptoms

## 2021-05-19 NOTE — ED NOTES
I have reviewed discharge instructions with the patient. The patient verbalized understanding. Patient left ED via Discharge Method: ambulatory to Home with     Opportunity for questions and clarification provided. Patient given 2 scripts. To continue your aftercare when you leave the hospital, you may receive an automated call from our care team to check in on how you are doing. This is a free service and part of our promise to provide the best care and service to meet your aftercare needs.  If you have questions, or wish to unsubscribe from this service please call [phone]. Thank you for Choosing our [facility] Emergency Department.

## 2021-12-15 PROBLEM — I25.10 CAD (CORONARY ARTERY DISEASE): Status: ACTIVE | Noted: 2021-12-15

## 2021-12-16 NOTE — PROGRESS NOTES
Patient pre-assessment complete for [facility] scheduled for [address], arrival time 0730. Patient verified using . Patient instructed to bring a list of all home medications on the day of procedure. NPO status reinforced. Patient informed to take a full dose aspirin 325mg  or 81 mg x 4 on the day of procedure. Patient instructed to HOLD insulin, Jardiance. Instructed they can take all other medications excluding vitamins & supplements. Patient verbalizes understanding of all instructions & denies any questions at this time.

## 2021-12-17 ENCOUNTER — HOSPITAL ENCOUNTER (OUTPATIENT)
Age: 51
Setting detail: OBSERVATION
Discharge: HOME | End: 2021-12-18
Attending: INTERNAL MEDICINE | Admitting: INTERNAL MEDICINE
Payer: COMMERCIAL

## 2021-12-17 ENCOUNTER — APPOINTMENT (OUTPATIENT)
Dept: NON INVASIVE DIAGNOSTICS | Age: 51
End: 2021-12-17
Attending: INTERNAL MEDICINE
Payer: COMMERCIAL

## 2021-12-17 DIAGNOSIS — I25.10 CAD (CORONARY ARTERY DISEASE): ICD-10-CM

## 2021-12-17 DIAGNOSIS — R94.39 ABNORMAL STRESS TEST: ICD-10-CM

## 2021-12-17 DIAGNOSIS — I25.110 CORONARY ARTERY DISEASE INVOLVING NATIVE HEART WITH UNSTABLE ANGINA PECTORIS, UNSPECIFIED VESSEL OR LESION TYPE (HCC): ICD-10-CM

## 2021-12-17 DIAGNOSIS — I25.110 CORONARY ARTERY DISEASE INVOLVING NATIVE CORONARY ARTERY OF NATIVE HEART WITH UNSTABLE ANGINA PECTORIS (HCC): ICD-10-CM

## 2021-12-17 DIAGNOSIS — R07.89 CHEST PRESSURE: ICD-10-CM

## 2021-12-17 LAB
ALBUMIN SERPL-MCNC: 3.6 G/DL (ref 3.5–5)
ALBUMIN/GLOB SERPL: 1 {RATIO} (ref 1.2–3.5)
ALP SERPL-CCNC: 80 U/L (ref 50–136)
ALT SERPL-CCNC: 32 U/L (ref 12–65)
ANION GAP SERPL CALC-SCNC: 7 MMOL/L (ref 7–16)
AST SERPL-CCNC: 24 U/L (ref 15–37)
ATRIAL RATE: 75 BPM
BILIRUB SERPL-MCNC: 0.3 MG/DL (ref 0.2–1.1)
BUN SERPL-MCNC: 24 MG/DL (ref 6–23)
CALCIUM SERPL-MCNC: 9.1 MG/DL (ref 8.3–10.4)
CALCULATED P AXIS, ECG09: 26 DEGREES
CALCULATED R AXIS, ECG10: 7 DEGREES
CALCULATED T AXIS, ECG11: 24 DEGREES
CHLORIDE SERPL-SCNC: 103 MMOL/L (ref 98–107)
CO2 SERPL-SCNC: 29 MMOL/L (ref 21–32)
CREAT SERPL-MCNC: 0.72 MG/DL (ref 0.6–1)
DIAGNOSIS, 93000: NORMAL
ECHO AO ASC DIAM: 2.9 CM
ECHO AO ASCENDING AORTA INDEX: 1.44 CM/M2
ECHO AO ROOT DIAM: 3 CM
ECHO AO ROOT INDEX: 1.49 CM/M2
ECHO AV AREA PEAK VELOCITY: 1.6 CM2
ECHO AV AREA PEAK VELOCITY: 1.6 CM2
ECHO AV PEAK GRADIENT: 6 MMHG
ECHO AV PEAK VELOCITY: 1.3 M/S
ECHO AV VELOCITY RATIO: 0.54
ECHO LA DIAMETER INDEX: 1.69 CM/M2
ECHO LA DIAMETER: 3.4 CM
ECHO LA TO AORTIC ROOT RATIO: 1.13
ECHO LV E' LATERAL VELOCITY: 11 CM/S
ECHO LV E' SEPTAL VELOCITY: 6 CM/S
ECHO LV EDV A2C: 56 ML
ECHO LV EDV A4C: 57 ML
ECHO LV EDV BP: 57 ML (ref 56–104)
ECHO LV EDV BP: 57 ML (ref 56–104)
ECHO LV EDV INDEX A4C: 28 ML/M2
ECHO LV EDV NDEX A2C: 28 ML/M2
ECHO LV EJECTION FRACTION A2C: 57 %
ECHO LV EJECTION FRACTION A4C: 61 %
ECHO LV EJECTION FRACTION BIPLANE: 58 % (ref 55–100)
ECHO LV EJECTION FRACTION BIPLANE: 58 % (ref 55–100)
ECHO LV ESV A2C: 24 ML
ECHO LV ESV A4C: 22 ML
ECHO LV ESV BP: 24 ML (ref 19–49)
ECHO LV ESV INDEX A2C: 12 ML/M2
ECHO LV ESV INDEX A4C: 11 ML/M2
ECHO LV ESV INDEX BP: 12 ML/M2
ECHO LV FRACTIONAL SHORTENING: 38 % (ref 28–44)
ECHO LV INTERNAL DIMENSION DIASTOLE INDEX: 2.24 CM/M2
ECHO LV INTERNAL DIMENSION DIASTOLIC: 4.5 CM (ref 3.9–5.3)
ECHO LV INTERNAL DIMENSION SYSTOLIC INDEX: 1.39 CM/M2
ECHO LV INTERNAL DIMENSION SYSTOLIC: 2.8 CM
ECHO LV IVSD: 0.9 CM (ref 0.6–0.9)
ECHO LV MASS 2D: 132.8 G (ref 67–162)
ECHO LV MASS INDEX 2D: 66.1 G/M2 (ref 43–95)
ECHO LV POSTERIOR WALL DIASTOLIC: 0.9 CM (ref 0.6–0.9)
ECHO LV RELATIVE WALL THICKNESS RATIO: 0.4
ECHO LVOT AREA: 2.8 CM2
ECHO LVOT DIAM: 1.9 CM
ECHO LVOT PEAK GRADIENT: 2 MMHG
ECHO LVOT PEAK VELOCITY: 0.7 M/S
ECHO MV A VELOCITY: 0.78 M/S
ECHO MV AREA PHT: 3.2 CM2
ECHO MV E DECELERATION TIME (DT): 239.3 MS
ECHO MV E VELOCITY: 0.73 M/S
ECHO MV E/A RATIO: 0.94
ECHO MV E/E' LATERAL: 6.64
ECHO MV E/E' RATIO (AVERAGED): 9.4
ECHO MV E/E' SEPTAL: 12.17
ECHO MV PRESSURE HALF TIME (PHT): 69.4 MS
ECHO RV INTERNAL DIMENSION: 2.8 CM
ECHO RV TAPSE: 1.8 CM (ref 1.5–2)
ERYTHROCYTE [DISTWIDTH] IN BLOOD BY AUTOMATED COUNT: 12.5 % (ref 11.9–14.6)
GLOBULIN SER CALC-MCNC: 3.7 G/DL (ref 2.3–3.5)
GLUCOSE SERPL-MCNC: 132 MG/DL (ref 65–100)
HCT VFR BLD AUTO: 47.6 % (ref 35.8–46.3)
HGB BLD-MCNC: 16 G/DL (ref 11.7–15.4)
MCH RBC QN AUTO: 28.9 PG (ref 26.1–32.9)
MCHC RBC AUTO-ENTMCNC: 33.6 G/DL (ref 31.4–35)
MCV RBC AUTO: 85.9 FL (ref 79.6–97.8)
NRBC # BLD: 0 K/UL (ref 0–0.2)
P-R INTERVAL, ECG05: 130 MS
PLATELET # BLD AUTO: 195 K/UL (ref 150–450)
PMV BLD AUTO: 12.4 FL (ref 9.4–12.3)
POTASSIUM SERPL-SCNC: 3.4 MMOL/L (ref 3.5–5.1)
PROT SERPL-MCNC: 7.3 G/DL (ref 6.3–8.2)
Q-T INTERVAL, ECG07: 456 MS
QRS DURATION, ECG06: 106 MS
QTC CALCULATION (BEZET), ECG08: 509 MS
RBC # BLD AUTO: 5.54 M/UL (ref 4.05–5.2)
SODIUM SERPL-SCNC: 139 MMOL/L (ref 136–145)
VENTRICULAR RATE, ECG03: 75 BPM
WBC # BLD AUTO: 10 K/UL (ref 4.3–11.1)

## 2021-12-17 PROCEDURE — 85027 COMPLETE CBC AUTOMATED: CPT

## 2021-12-17 PROCEDURE — 74011000250 HC RX REV CODE- 250: Performed by: INTERNAL MEDICINE

## 2021-12-17 PROCEDURE — 77030015766: Performed by: INTERNAL MEDICINE

## 2021-12-17 PROCEDURE — 92928 PRQ TCAT PLMT NTRAC ST 1 LES: CPT | Performed by: INTERNAL MEDICINE

## 2021-12-17 PROCEDURE — 74011250637 HC RX REV CODE- 250/637: Performed by: INTERNAL MEDICINE

## 2021-12-17 PROCEDURE — 77030042317 HC BND COMPR HEMSTAT -B: Performed by: INTERNAL MEDICINE

## 2021-12-17 PROCEDURE — C1894 INTRO/SHEATH, NON-LASER: HCPCS | Performed by: INTERNAL MEDICINE

## 2021-12-17 PROCEDURE — 99152 MOD SED SAME PHYS/QHP 5/>YRS: CPT | Performed by: INTERNAL MEDICINE

## 2021-12-17 PROCEDURE — C1769 GUIDE WIRE: HCPCS | Performed by: INTERNAL MEDICINE

## 2021-12-17 PROCEDURE — 74011000636 HC RX REV CODE- 636: Performed by: INTERNAL MEDICINE

## 2021-12-17 PROCEDURE — 77030012468 HC VLV BLEEDBK CNTRL ABBT -B: Performed by: INTERNAL MEDICINE

## 2021-12-17 PROCEDURE — 77030016699 HC CATH ANGI DX INFN1 CARD -A: Performed by: INTERNAL MEDICINE

## 2021-12-17 PROCEDURE — 93306 TTE W/DOPPLER COMPLETE: CPT

## 2021-12-17 PROCEDURE — 74011250636 HC RX REV CODE- 250/636: Performed by: INTERNAL MEDICINE

## 2021-12-17 PROCEDURE — 93458 L HRT ARTERY/VENTRICLE ANGIO: CPT | Performed by: INTERNAL MEDICINE

## 2021-12-17 PROCEDURE — 93005 ELECTROCARDIOGRAM TRACING: CPT | Performed by: INTERNAL MEDICINE

## 2021-12-17 PROCEDURE — C1887 CATHETER, GUIDING: HCPCS | Performed by: INTERNAL MEDICINE

## 2021-12-17 PROCEDURE — G0378 HOSPITAL OBSERVATION PER HR: HCPCS

## 2021-12-17 PROCEDURE — C1874 STENT, COATED/COV W/DEL SYS: HCPCS | Performed by: INTERNAL MEDICINE

## 2021-12-17 PROCEDURE — 2709999900 HC NON-CHARGEABLE SUPPLY

## 2021-12-17 PROCEDURE — 74011000258 HC RX REV CODE- 258: Performed by: INTERNAL MEDICINE

## 2021-12-17 PROCEDURE — 99153 MOD SED SAME PHYS/QHP EA: CPT | Performed by: INTERNAL MEDICINE

## 2021-12-17 PROCEDURE — 74011250637 HC RX REV CODE- 250/637: Performed by: NURSE PRACTITIONER

## 2021-12-17 PROCEDURE — 99024 POSTOP FOLLOW-UP VISIT: CPT | Performed by: INTERNAL MEDICINE

## 2021-12-17 PROCEDURE — C1725 CATH, TRANSLUMIN NON-LASER: HCPCS | Performed by: INTERNAL MEDICINE

## 2021-12-17 PROCEDURE — 80053 COMPREHEN METABOLIC PANEL: CPT

## 2021-12-17 DEVICE — STENT RONYX25008UX RESOLUTE ONYX 2.50X08
Type: IMPLANTABLE DEVICE | Status: FUNCTIONAL
Brand: RESOLUTE ONYX™

## 2021-12-17 DEVICE — STENT RONYX20015UX RESOLUTE ONYX 2.00X15
Type: IMPLANTABLE DEVICE | Status: FUNCTIONAL
Brand: RESOLUTE ONYX™

## 2021-12-17 RX ORDER — MIDAZOLAM HYDROCHLORIDE 1 MG/ML
INJECTION, SOLUTION INTRAMUSCULAR; INTRAVENOUS AS NEEDED
Status: DISCONTINUED | OUTPATIENT
Start: 2021-12-17 | End: 2021-12-17 | Stop reason: HOSPADM

## 2021-12-17 RX ORDER — LOSARTAN POTASSIUM 50 MG/1
50 TABLET ORAL DAILY
Status: DISCONTINUED | OUTPATIENT
Start: 2021-12-18 | End: 2021-12-18 | Stop reason: HOSPADM

## 2021-12-17 RX ORDER — NITROGLYCERIN 0.4 MG/1
0.4 TABLET SUBLINGUAL
Status: DISCONTINUED | OUTPATIENT
Start: 2021-12-17 | End: 2021-12-18 | Stop reason: HOSPADM

## 2021-12-17 RX ORDER — SODIUM CHLORIDE 0.9 % (FLUSH) 0.9 %
5-40 SYRINGE (ML) INJECTION AS NEEDED
Status: DISCONTINUED | OUTPATIENT
Start: 2021-12-17 | End: 2021-12-18 | Stop reason: HOSPADM

## 2021-12-17 RX ORDER — LIDOCAINE HYDROCHLORIDE 10 MG/ML
INJECTION INFILTRATION; PERINEURAL AS NEEDED
Status: DISCONTINUED | OUTPATIENT
Start: 2021-12-17 | End: 2021-12-17 | Stop reason: HOSPADM

## 2021-12-17 RX ORDER — CLOPIDOGREL BISULFATE 75 MG/1
75 TABLET ORAL DAILY
Status: DISCONTINUED | OUTPATIENT
Start: 2021-12-18 | End: 2021-12-18 | Stop reason: HOSPADM

## 2021-12-17 RX ORDER — CHLORTHALIDONE 25 MG/1
25 TABLET ORAL EVERY EVENING
Status: DISCONTINUED | OUTPATIENT
Start: 2021-12-17 | End: 2021-12-18 | Stop reason: HOSPADM

## 2021-12-17 RX ORDER — ASPIRIN 81 MG/1
81 TABLET ORAL EVERY EVENING
Status: DISCONTINUED | OUTPATIENT
Start: 2021-12-17 | End: 2021-12-17 | Stop reason: SDUPTHER

## 2021-12-17 RX ORDER — DIAZEPAM 5 MG/1
5 TABLET ORAL ONCE
Status: COMPLETED | OUTPATIENT
Start: 2021-12-17 | End: 2021-12-17

## 2021-12-17 RX ORDER — GUAIFENESIN 100 MG/5ML
324 LIQUID (ML) ORAL
Status: ACTIVE | OUTPATIENT
Start: 2021-12-17 | End: 2021-12-17

## 2021-12-17 RX ORDER — POTASSIUM CHLORIDE 20 MEQ/1
40 TABLET, EXTENDED RELEASE ORAL ONCE
Status: COMPLETED | OUTPATIENT
Start: 2021-12-17 | End: 2021-12-17

## 2021-12-17 RX ORDER — GUAIFENESIN 100 MG/5ML
81 LIQUID (ML) ORAL DAILY
Status: DISCONTINUED | OUTPATIENT
Start: 2021-12-18 | End: 2021-12-18 | Stop reason: HOSPADM

## 2021-12-17 RX ORDER — SODIUM CHLORIDE 0.9 % (FLUSH) 0.9 %
5-40 SYRINGE (ML) INJECTION EVERY 8 HOURS
Status: DISCONTINUED | OUTPATIENT
Start: 2021-12-17 | End: 2021-12-18 | Stop reason: HOSPADM

## 2021-12-17 RX ORDER — RANOLAZINE 500 MG/1
500 TABLET, EXTENDED RELEASE ORAL 2 TIMES DAILY
Status: DISCONTINUED | OUTPATIENT
Start: 2021-12-17 | End: 2021-12-18 | Stop reason: HOSPADM

## 2021-12-17 RX ORDER — MORPHINE SULFATE 2 MG/ML
2 INJECTION, SOLUTION INTRAMUSCULAR; INTRAVENOUS
Status: DISCONTINUED | OUTPATIENT
Start: 2021-12-17 | End: 2021-12-18 | Stop reason: HOSPADM

## 2021-12-17 RX ORDER — METOPROLOL SUCCINATE 25 MG/1
50 TABLET, EXTENDED RELEASE ORAL DAILY
Status: DISCONTINUED | OUTPATIENT
Start: 2021-12-18 | End: 2021-12-18 | Stop reason: HOSPADM

## 2021-12-17 RX ORDER — ACETAMINOPHEN 325 MG/1
650 TABLET ORAL
Status: DISCONTINUED | OUTPATIENT
Start: 2021-12-17 | End: 2021-12-17 | Stop reason: HOSPADM

## 2021-12-17 RX ORDER — SODIUM CHLORIDE 9 MG/ML
75 INJECTION, SOLUTION INTRAVENOUS CONTINUOUS
Status: DISCONTINUED | OUTPATIENT
Start: 2021-12-17 | End: 2021-12-18 | Stop reason: HOSPADM

## 2021-12-17 RX ORDER — HEPARIN SODIUM 200 [USP'U]/100ML
INJECTION, SOLUTION INTRAVENOUS
Status: COMPLETED | OUTPATIENT
Start: 2021-12-17 | End: 2021-12-17

## 2021-12-17 RX ADMIN — CHLORTHALIDONE 25 MG: 25 TABLET ORAL at 17:28

## 2021-12-17 RX ADMIN — SODIUM CHLORIDE 75 ML/HR: 900 INJECTION, SOLUTION INTRAVENOUS at 15:32

## 2021-12-17 RX ADMIN — RANOLAZINE 500 MG: 500 TABLET, FILM COATED, EXTENDED RELEASE ORAL at 17:27

## 2021-12-17 RX ADMIN — POTASSIUM CHLORIDE 40 MEQ: 20 TABLET, EXTENDED RELEASE ORAL at 22:41

## 2021-12-17 RX ADMIN — DIAZEPAM 5 MG: 5 TABLET ORAL at 08:46

## 2021-12-17 RX ADMIN — SODIUM CHLORIDE 75 ML/HR: 900 INJECTION, SOLUTION INTRAVENOUS at 09:14

## 2021-12-17 NOTE — PROGRESS NOTES
Patient received to [address] [place] [address] [address]  Ambulatory from [facility]. Patient scheduled for C today with Dr Jaswant Fong. Procedure reviewed & questions answered, voiced good understanding consent obtained & placed on chart. All medications and medical history reviewed. Will prep patient per orders. Patient & family updated on plan of care. The patient has a fraility score of 3-MANAGING WELL, based on ability to ambulate to room unassisted.       Patient took Aspirin 324 today at 0700 prior to arrival.

## 2021-12-17 NOTE — PROGRESS NOTES
Problem: Falls - Risk of  Goal: *Absence of Falls  Description: Document Floyd Chinchilla Fall Risk and appropriate interventions in the flowsheet.   Outcome: Progressing Towards Goal  Note: Fall Risk Interventions:            Medication Interventions: Teach patient to arise slowly

## 2021-12-17 NOTE — PROGRESS NOTES
TRANSFER - IN REPORT:    Verbal report received from [facility] on [employer] being received from [facility] for routine progression of care. Report consisted of patients Situation, Background, Assessment and Recommendations(SBAR). Information from the following report(s) SBAR, Kardex, Procedure Summary and MAR was reviewed. Opportunity for questions and clarification was provided. Assessment completed upon patients arrival to unit and care assumed. Patient received to room 306. Patient connected to monitor and assessment completed. Plan of care reviewed. Patient oriented to room and call light. Patient aware to use call light to communicate any chest pain or needs. Admission skin assessment completed with second RN and reveals the following: bilateral heels and sacrum clean dry and intact. R radial site s/p cath with TR band inflated. No hematoma or bruising. Swelling noted around TR band. No other wounds noted. Will continue to monitor.

## 2021-12-17 NOTE — PROGRESS NOTES
TRANSFER - IN REPORT:    Verbal report received from [facility] RN(name) on [employer]  being received from [facility](unit) for routine progression of care      Report consisted of patients Situation, Background, Assessment and   Recommendations(SBAR). Information from the following report(s) Procedure Summary was reviewed with the receiving nurse. Opportunity for questions and clarification was provided. Assessment completed upon patients arrival to unit and care assumed.

## 2021-12-17 NOTE — PROGRESS NOTES
TRANSFER - OUT REPORT:    Verbal [facility](name) on [facility]  being transferred to [facility](unit) for routine post - op       Report consisted of patients Situation, Background, Assessment and   Recommendations(SBAR). Information from the following report(s) Procedure Summary and MAR was reviewed with the receiving nurse. Opportunity for questions and clarification was provided.       Patient transported with:   Registered Nurse
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
## 2021-12-17 NOTE — PROGRESS NOTES
TRANSFER - OUT REPORT:    Verbal report given to RN(name) on Sandoval Kenney  being transferred to CPRU(unit) for routine progression of care       Report consisted of patients Situation, Background, Assessment and   Recommendations(SBAR). Information from the following report(s) SBAR was reviewed with the receiving nurse.     [facility] with Dr Alonzo Gamez  2 stents to mid LAD  1 stent to mid RCA  R Radial  TR band at 12mL  Versed 3mg  Heparin 5000 units in radial cocktail  Angiomax bolus and gtt off when current bag hanging is complete 39yo Male with h/o Laparoscopic umbilical hernia rpr with mesh (3yrs ago) and no sig. PMH presents c/o RLQ abdominal pain and diarrhea x3 days, admitted with Terminal Ileitis with inflammatory stranding around the tip of the appendix which may be reactive vs tip appendicitis. SUZETTE, hypokalemia and hyponatremia.

## 2021-12-17 NOTE — PROGRESS NOTES
CM chart reviewed. Pt scheduled for [facility] today with Dr Brynn Khanna. PCP confirmed. Insurance verified. Able to afford her meds. No dc needs identified but will remain available. Care Management Interventions  PCP Verified by CM: Yes ([facility])  Mode of Transport at Discharge:  Other (see comment) (Family)  Transition of Care Consult (CM Consult): Discharge Planning  Discharge Durable Medical Equipment: No  Physical Therapy Consult: No  Occupational Therapy Consult: Yes  Speech Therapy Consult: No  Support Systems: Other Family Member(s),Spouse/Significant Other,Child(sasha),Friend/Neighbor,[employer] [employer]  Confirm Follow Up Transport: Family  The Plan for Transition of Care is Related to the Following Treatment Goals : Return home and back to her baseline  Discharge Location  Discharge Placement: Home

## 2021-12-18 VITALS
BODY MASS INDEX: 31.01 KG/M2 | RESPIRATION RATE: 18 BRPM | HEIGHT: 67 IN | SYSTOLIC BLOOD PRESSURE: 113 MMHG | HEART RATE: 83 BPM | DIASTOLIC BLOOD PRESSURE: 72 MMHG | TEMPERATURE: 98.3 F | WEIGHT: 197.6 LBS | OXYGEN SATURATION: 94 %

## 2021-12-18 LAB
CHOLEST SERPL-MCNC: 213 MG/DL
HDLC SERPL-MCNC: 34 MG/DL (ref 40–60)
HDLC SERPL: 6.3 {RATIO}
LDLC SERPL CALC-MCNC: 129.8 MG/DL
TRIGL SERPL-MCNC: 246 MG/DL (ref 35–150)
VLDLC SERPL CALC-MCNC: 49.2 MG/DL (ref 6–23)

## 2021-12-18 PROCEDURE — 74011250637 HC RX REV CODE- 250/637: Performed by: PHYSICIAN ASSISTANT

## 2021-12-18 PROCEDURE — 80061 LIPID PANEL: CPT

## 2021-12-18 PROCEDURE — 36415 COLL VENOUS BLD VENIPUNCTURE: CPT

## 2021-12-18 PROCEDURE — 74011250637 HC RX REV CODE- 250/637: Performed by: NURSE PRACTITIONER

## 2021-12-18 PROCEDURE — 74011250637 HC RX REV CODE- 250/637: Performed by: INTERNAL MEDICINE

## 2021-12-18 PROCEDURE — G0378 HOSPITAL OBSERVATION PER HR: HCPCS

## 2021-12-18 PROCEDURE — 99217 PR OBSERVATION CARE DISCHARGE MANAGEMENT: CPT | Performed by: INTERNAL MEDICINE

## 2021-12-18 RX ORDER — LORAZEPAM 0.5 MG/1
0.5 TABLET ORAL
Status: DISCONTINUED | OUTPATIENT
Start: 2021-12-18 | End: 2021-12-18 | Stop reason: HOSPADM

## 2021-12-18 RX ORDER — FLUCONAZOLE 100 MG/1
200 TABLET ORAL
Status: COMPLETED | OUTPATIENT
Start: 2021-12-18 | End: 2021-12-18

## 2021-12-18 RX ORDER — FLUCONAZOLE 200 MG/1
200 TABLET ORAL
Qty: 2 TABLET | Refills: 0 | Status: SHIPPED | OUTPATIENT
Start: 2021-12-18 | End: 2021-12-21

## 2021-12-18 RX ORDER — FLUCONAZOLE 200 MG/1
200 TABLET ORAL
Qty: 2 TABLET | Refills: 0 | Status: SHIPPED | OUTPATIENT
Start: 2021-12-18 | End: 2021-12-18 | Stop reason: SDUPTHER

## 2021-12-18 RX ADMIN — FLUCONAZOLE 200 MG: 100 TABLET ORAL at 08:38

## 2021-12-18 RX ADMIN — ASPIRIN 81 MG: 81 TABLET, CHEWABLE ORAL at 08:15

## 2021-12-18 RX ADMIN — RANOLAZINE 500 MG: 500 TABLET, FILM COATED, EXTENDED RELEASE ORAL at 08:15

## 2021-12-18 RX ADMIN — CLOPIDOGREL BISULFATE 75 MG: 75 TABLET ORAL at 08:15

## 2021-12-18 RX ADMIN — LORAZEPAM 0.5 MG: 0.5 TABLET ORAL at 04:50

## 2021-12-18 RX ADMIN — LOSARTAN POTASSIUM 50 MG: 50 TABLET, FILM COATED ORAL at 08:15

## 2021-12-18 RX ADMIN — METOPROLOL SUCCINATE 50 MG: 25 TABLET, EXTENDED RELEASE ORAL at 08:15

## 2021-12-18 NOTE — DISCHARGE SUMMARY
[facility] Cardiology Discharge Summary     Patient ID:  Marc Blizzard  [id number]  12 y.o.  1970    Admit date: 12/17/2021    Discharge date:  12/18/2021    Admitting Physician: Lety Santoyo MD     Discharge Physician: Dr. Zaynab Remy    Admission Diagnoses: CAD (coronary artery disease) [I25.10]    Discharge Diagnoses:    Diagnosis    CAD (coronary artery disease)    Essential hypertension with goal blood pressure less than 140/90    Dyslipidemia    Type 2 diabetes mellitus without complication ([address]    Chest pressure       Cardiology Procedures this admission:  Left heart catheterization with PCI  Consults: None    Hospital Course: Patient was seen at the office of [facility]iology by Dr. Jaswant Fong for complaints of anginal symptoms and was subsequently scheduled for an AM Admission [facility] on 12/17/21. Patient underwent cardiac catheterization by Dr. Jaswant Fong. Patient was found to have 90% stenosis of the mLAD that was stented with a 2.0 x 15-mm Resolute Hakeem SHARON with 0% residual stenosis. Patient was also found to have 80% stenosis of the mRCA that was stented with a 2.5 x 8-mm Resolute Widen SHARON with 0% residual stenosis. Patient tolerated the procedure well and was taken to the telemetry floor for recovery. The morning of discharge, patient was up feeling well without any complaints of chest pain or shortness of breath. Patient's right radial cath site was clean, dry and intact without hematoma or bruit. Patient's labs were WNL. Patient was seen and examined by Dr. Zaynab Remy and determined stable and ready for discharge. Patient was instructed on the importance of medication compliance including taking aspirin and Plavix everyday without missing a dose. After receiving drug eluting stents, the patient will remain on dual anti-platelet therapy for 1 year. For maximized medical therapy for CAD, patient will continue BB and ARB as well.  No statin therapy at the time of discharge due to intolerance to all statins, cont repatha. The patient will follow up with [facility] -- Dr. Velez  in 2-4 weeks- we will call pt with appt. Patient has been referred to cardiac rehab. Given diflucan for intractable yeast infection. 15 minute discussion re hospital course and d/c instructions. All questions answered, all meds refilled that were needed. Indication for treatment and risk of dual antiplatelet therapy was discussed with the patient and he/she understands to seek medical care immediately if any signs of bleeding. Discussed with the patient importance of diet and exercise to prevent further cardiovascular disease. DISPOSITION: The patient is being discharged home in stable condition on a low saturated fat, low cholesterol and low salt diet. The patient is instructed to advance activities as tolerated to the limit of fatigue or shortness of breath. The patient is instructed to avoid all heavy lifting for 3-5 days. The patient is instructed to watch the cath site for bleeding/oozing; if seen, the patient is instructed to apply firm pressure with a clean cloth and call [facility] at [phone]. The patient is instructed to watch for signs of infection which include: increasing area of redness, fever/hot to touch or purulent drainage at the catheterization site. The patient is instructed not to soak in a bathtub for 7-10 days, but is cleared to shower. The patient is instructed to call the office or return to the ER for immediate evaluation for any shortness of breath or chest pain not relieved by NTG. Discharge Exam: Patient has been seen by Dr. Rose Meter: see his progress note for exam details.     Visit Vitals  /72 (BP 1 Location: Left upper arm, BP Patient Position: At rest)   Pulse 83   Temp 98.3 °F (36.8 °C)   Resp 18   Ht 5' 7\" (1.702 m)   Wt 89.6 kg (197 lb 9.6 oz)   SpO2 94%   BMI 30.95 kg/m²       Recent Results (from the past 24 hour(s))   CBC W/O DIFF Collection Time: 12/17/21  8:44 AM   Result Value Ref Range    WBC 10.0 4.3 - 11.1 K/uL    RBC 5.54 (H) 4.05 - 5.2 M/uL    HGB 16.0 (H) 11.7 - 15.4 g/dL    HCT 47.6 (H) 35.8 - 46.3 %    MCV 85.9 79.6 - 97.8 FL    MCH 28.9 26.1 - 32.9 PG    MCHC 33.6 31.4 - 35.0 g/dL    RDW 12.5 11.9 - 14.6 %    PLATELET 892 652 - 917 K/uL    MPV 12.4 (H) 9.4 - 12.3 FL    ABSOLUTE NRBC 0.00 0.0 - 0.2 K/uL   METABOLIC PANEL, COMPREHENSIVE    Collection Time: 12/17/21  8:44 AM   Result Value Ref Range    Sodium 139 136 - 145 mmol/L    Potassium 3.4 (L) 3.5 - 5.1 mmol/L    Chloride 103 98 - 107 mmol/L    CO2 29 21 - 32 mmol/L    Anion gap 7 7 - 16 mmol/L    Glucose 132 (H) 65 - 100 mg/dL    BUN 24 (H) 6 - 23 MG/DL    Creatinine 0.72 0.6 - 1.0 MG/DL    GFR est AA >60 >60 ml/min/1.73m2    GFR est non-AA >60 >60 ml/min/1.73m2    Calcium 9.1 8.3 - 10.4 MG/DL    Bilirubin, total 0.3 0.2 - 1.1 MG/DL    ALT (SGPT) 32 12 - 65 U/L    AST (SGOT) 24 15 - 37 U/L    Alk.  phosphatase 80 50 - 136 U/L    Protein, total 7.3 6.3 - 8.2 g/dL    Albumin 3.6 3.5 - 5.0 g/dL    Globulin 3.7 (H) 2.3 - 3.5 g/dL    A-G Ratio 1.0 (L) 1.2 - 3.5     EKG, 12 LEAD, INITIAL    Collection Time: 12/17/21 10:00 AM   Result Value Ref Range    Ventricular Rate 75 BPM    Atrial Rate 75 BPM    P-R Interval 130 ms    QRS Duration 106 ms    Q-T Interval 456 ms    QTC Calculation (Bezet) 509 ms    Calculated P Axis 26 degrees    Calculated R Axis 7 degrees    Calculated T Axis 24 degrees    Diagnosis       Normal sinus rhythm  Low voltage QRS  Prolonged QT  Abnormal ECG  When compared with ECG of 18-MAY-2021 23:19,  No significant change was found  Confirmed by Yesenia Sanchez ([id number]) on 12/17/2021 10:49:55 AM     ECHO ADULT COMPLETE    Collection Time: 12/17/21 10:02 AM   Result Value Ref Range    IVSd 0.9 0.6 - 0.9 cm    LVIDd 4.5 3.9 - 5.3 cm    LVIDs 2.8 cm    LVOT Diameter 1.9 cm    LVPWd 0.9 0.6 - 0.9 cm    EF BP 58 55 - 100 %    EF BP 58 55 - 100 %    LV Ejection Fraction A2C 57 %    LV Ejection Fraction A4C 61 %    LV EDV A2C 56 mL    LV EDV A4C 57 mL    LV EDV BP 57 56 - 104 mL    LV EDV BP 57 56 - 104 mL    LV ESV A2C 24 mL    LV ESV A4C 22 mL    LV ESV BP 24 19 - 49 mL    LVOT Peak Gradient 2 mmHg    LVOT Peak Velocity 0.7 m/s    RVIDd 2.8 cm    LA Diameter 3.4 cm    AV Area by Peak Velocity 1.6 cm2    AV Area by Peak Velocity 1.6 cm2    AV Peak Gradient 6 mmHg    AV Peak Velocity 1.3 m/s    MV A Velocity 0.78 m/s    MV E Wave Deceleration Time 239.3 ms    MV E Velocity 0.73 m/s    LV E' Lateral Velocity 11 cm/s    LV E' Septal Velocity 6 cm/s    MV PHT 69.4 ms    MV Area by PHT 3.2 cm2    TAPSE 1.8 1.5 - 2.0 cm    Ascending Aorta 2.9 cm    Aortic Root 3.0 cm    Fractional Shortening 2D 38 28 - 44 %    LV ESV Index BP 12 mL/m2    LV ESV Index A4C 11 mL/m2    LV EDV Index A4C 28 mL/m2    LV ESV Index A2C 12 mL/m2    LV EDV Index A2C 28 mL/m2    LVIDd Index 2.24 cm/m2    LVIDs Index 1.39 cm/m2    LV RWT Ratio 0.40     LV Mass 2D 132.8 67 - 162 g    LV Mass 2D Index 66.1 43 - 95 g/m2    MV E/A 0.94     E/E' Ratio (Averaged) 9.40     E/E' Lateral 6.64     E/E' Septal 12.17     LVOT Area 2.8 cm2    LA Size Index 1.69 cm/m2    LA/AO Root Ratio 1.13     Ao Root Index 1.49 cm/m2    Ascending Aorta Index 1.44 cm/m2    AV Velocity Ratio 0.54    LIPID PANEL    Collection Time: 12/18/21  3:54 AM   Result Value Ref Range    Cholesterol, total 213 (H) <200 MG/DL    Triglyceride 246 (H) 35 - 150 MG/DL    HDL Cholesterol 34 (L) 40 - 60 MG/DL    LDL, calculated 129.8 (H) <100 MG/DL    VLDL, calculated 49.2 (H) 6.0 - 23.0 MG/DL    CHOL/HDL Ratio 6.3           Patient Instructions:     Current Discharge Medication List      START taking these medications    Details   fluconazole (DIFLUCAN) 200 mg tablet Take 1 Tablet by mouth every fourty-eight (48) hours for 2 doses. FDA advises cautious prescribing of oral fluconazole in pregnancy.   Qty: 2 Tablet, Refills: 0  Start date: 12/18/2021, End date: 12/21/2021         CONTINUE these medications which have NOT CHANGED    Details   ranolazine ER (Ranexa) 500 mg SR tablet Take 1 Tablet by mouth two (2) times a day. Qty: 180 Tablet, Refills: 3      clopidogreL (PLAVIX) 75 mg tab Take 1 Tablet by mouth daily. Qty: 90 Tablet, Refills: 3      potassium chloride (K-DUR, KLOR-CON M20) 20 mEq tablet Take 1 Tablet by mouth daily. Qty: 90 Tablet, Refills: 3      losartan (COZAAR) 50 mg tablet Take 1 Tablet by mouth daily. Qty: 90 Tablet, Refills: 3      evolocumab (Repatha SureClick) pen injection 1 mL by SubCUTAneous route every fourteen (14) days. Qty: 6 Pen, Refills: 3      metoprolol succinate (TOPROL-XL) 50 mg XL tablet Take 1 Tablet by mouth daily. Qty: 90 Tablet, Refills: 3      Toujeo Max U-300 SoloStar 300 unit/mL (3 mL) inpn INJECT 10 15 UNITS UNDER THE SKIN DAILY      levalbuterol (XOPENEX) 0.63 mg/3 mL nebu Take 0.63 mg by inhalation every six (6) hours as needed. guaiFENesin (Mucinex) 1,200 mg Ta12 ER tablet Take 1,200 mg by mouth two (2) times a day. sertraline (Zoloft) 100 mg tablet Take  by mouth daily. 150 mg daily      empagliflozin (JARDIANCE) 25 mg tablet Take 25 mg by mouth daily. insulin lispro (HUMALOG KWIKPEN INSULIN SC) 3 Units by SubCUTAneous route Before breakfast, lunch, and dinner. cetirizine (ZYRTEC) 10 mg tablet Take 10 mg by mouth. cholecalciferol, vitamin D3, 4,000 unit cap Take 8,000 Units by mouth. aspirin delayed-release 81 mg tablet Take 81 mg by mouth every evening. albuterol (PROVENTIL VENTOLIN) 2.5 mg /3 mL (0.083 %) nebulizer solution 1 vial via nebulizer qid  Qty: 120 Each, Refills: 11      chlorthalidone (HYGROTEN) 25 mg tablet Take 25 mg by mouth every evening. Indications: hypertension      LORazepam (ATIVAN) 0.5 mg tablet Take 0.5 mg by mouth every four (4) hours as needed for Anxiety.       fluticasone (FLONASE) 50 mcg/actuation nasal spray 2 sprays by Both Nostrils route as needed for Rhinitis. ondansetron (ZOFRAN ODT) 4 mg disintegrating tablet Take 4 mg by mouth three (3) times daily as needed. silver sulfADIAZINE (SILVADENE) 1 % topical cream       nitroglycerin (NITROSTAT) 0.4 mg SL tablet 1 Tab by SubLINGual route every five (5) minutes as needed for Chest Pain.   Qty: 1 Bottle, Refills: 3

## 2021-12-18 NOTE — PROGRESS NOTES
Problem: Falls - Risk of  Goal: *Absence of Falls  Description: Document Corby Dove Fall Risk and appropriate interventions in the flowsheet.   Outcome: Progressing Towards Goal  Note: Fall Risk Interventions:            Medication Interventions: Teach patient to arise slowly

## 2021-12-18 NOTE — PROGRESS NOTES
Bedside shift change report given to Cari (oncoming nurse) by self (offgoing nurse).  Report included the following information SBAR, Kardex, Procedure Summary, Intake/Output, MAR and Cardiac Rhythm SR.

## 2021-12-18 NOTE — DISCHARGE INSTRUCTIONS
Patient Education   The patient is being discharged home in stable condition on a low saturated fat, low cholesterol and low salt diet. The patient is instructed to advance activities as tolerated to the limit of fatigue or shortness of breath. The patient is instructed to avoid all heavy lifting for 3-5 days. The patient is instructed to watch the cath site for bleeding/oozing; if seen, the patient is instructed to apply firm pressure with a clean cloth and call [facility] at [phone]. The patient is instructed to watch for signs of infection which include: increasing area of redness, fever/hot to touch or purulent drainage at the catheterization site. The patient is instructed not to soak in a bathtub for 7-10 days, but is cleared to shower. The patient is instructed to call the office or return to the ER for immediate evaluation for any shortness of breath or chest pain not relieved by NTG.   No lifting of 10 lbs or more for 7 days, no tub baths for a week, may shower, no creams, lotions powders, or ointments to site for a week. Coronary Angiogram: What to Expect at [address] [place] [address]  A coronary angiogram is a test to examine the large blood vessel of your heart (coronary artery). The doctor inserted a thin, flexible tube (catheter) into a blood vessel in your thigh. In some cases, the catheter is placed in a blood vessel in the arm or shoulder. Your groin or arm may have a bruise and feel sore for a day or two after a coronary angiogram. You can do light activities around the house but nothing strenuous for several days. This care sheet gives you a general idea about how long it will take for you to recover. But each person recovers at a different pace. Follow the steps below to feel better as quickly as possible. How can you care for yourself at home? Activity  · Do not lift more than 5-10 pounds for three days.   You can walk around the house and do light activity, such as going to the restroom or getting something to eat or drink. No strenuous activity for 3 days. · No driving for 48 hours. · No bending, stooping, or squatting for 3 days. · Try not to walk up stairs for the first couple of days (if the catheter was placed in the artery in your groin). · Go back to regular exercise in one week. Exercise is good for your heart. Get at least 30 minutes of physical activity on most days of the week. Walking is a good choice. Diet  · Drink plenty of fluids to help your body flush out the dye. If you have kidney, heart, or liver disease and have to limit fluids, talk with your doctor before you increase the amount of fluids you drink. · Keep eating a heart-healthy, low-fat diet that has lots of fruits, vegetables, and whole grains. If you have not been eating this way, talk to your doctor. You also may want to talk to a dietitian. This expert can help you to learn about healthy foods and plan meals. Medicines  · Your doctor may prescribe a blood-thinning medicine like aspirin or Plavix. It is very important that you take these medicines exactly as directed in order to keep the coronary artery open and reduce your risk of a heart attack. Call your doctor if you think you are having a problem with your medicine. Care of the catheter site  · For the first 3 days, keep a bandage over the spot where the catheter was inserted. · You may shower in 24 hours, but no tub baths, hot tubs, or swimming for 1 week. · Do not put any lotions, creams, powders, or ointments over site until healed. Follow-up care is a key part of your treatment and safety. Be sure to make and go to all appointments, and call your doctor if you are having problems. Its also a good idea to know your test results and keep a list of the medicines you take. When should you call for help? Call 911 anytime you think you may need emergency care. For example, call if:  · You pass out (lose consciousness).   · You have severe trouble breathing. · You have sudden chest pain and shortness of breath, or you cough up blood. · You have chest pain or pressure. This may occur with:  ¨ Sweating. ¨ Shortness of breath. ¨ Nausea or vomiting. ¨ Pain that spreads from the chest to the neck, jaw, or one or both shoulders or arms. ¨ Dizziness or lightheadedness. ¨ A fast or uneven pulse. ¨ A dot of blood on your bandage is ok, but your bandage should not be soaked with blood. If this occurs at home lay down and have someone place a clean towel or washcloth over the site and hold firm pressure until emergency services gets to you. · If you have been diagnosed with angina, and you have chest pain that does not go away with rest or is not getting better within 5 minutes after you take a dose of nitroglycerin. Call your doctor now or seek immediate medical care if:  · You are bleeding from the area where the catheter was put in your artery. · You have signs of infection, such as:  ¨ Increased pain, swelling, warmth, or redness. ¨ Red streaks leading from the catheter site. ¨ Pus draining from the catheter site. ¨ Swollen lymph nodes in your neck, armpits, or groin. ¨ A fever. · Your leg or arm looks blue or feels cold, numb, or tingly. Watch closely for changes in your health, and be sure to contact your doctor if you have any problems. Where can you learn more? Go to [email or web address]  Enter D192 in the search box to learn more about \"Coronary Angiogram: What to Expect at Home\". This care instruction is for use with your licensed healthcare professional. If you have questions about a medical condition or this instruction, always ask your healthcare professional. Care instructions adapted by [facility] (which disclaims liability or warranty for this information) from [place], [address] [facility] 2008. [facility] disclaims any warranty or liability for your use of this information.           Clopidogrel (By mouth)   Clopidogrel (myty-TPG-[id number]-grel)  Helps prevent stroke, heart attack, and other heart problems. This medicine is a platelet inhibitor. Brand Name(s): Plavix   There may be other brand names for this medicine. When This Medicine Should Not Be Used: This medicine is not right for everyone. Do not use it if you had an allergic reaction to clopidogrel. How to Use This Medicine:   Tablet  · Your doctor will tell you how much medicine to use. Do not use more than directed. · This medicine should come with a Medication Guide. Ask your pharmacist for a copy if you do not have one. · Missed dose: Take a dose as soon as you remember. If it is almost time for your next dose, wait until then and take a regular dose. Do not take extra medicine to make up for a missed dose. · Store the medicine in a closed container at room temperature, away from heat, moisture, and direct light. Drugs and Foods to Avoid:   Ask your doctor or pharmacist before using any other medicine, including over-the-counter medicines, vitamins, and herbal products. · Some medicines can affect how clopidogrel works. Tell your doctor if you are using any of the following:   ¨ Esomeprazole, omeprazole, repaglinide, or warfarin  ¨ Medicine to treat depression  ¨ NSAID pain or arthritis medicine (including celecoxib, diclofenac, ibuprofen, or naproxen)  Warnings While Using This Medicine:   · Tell your doctor if you are pregnant or breastfeeding, or if you have bleeding problems, stomach ulcer or bleeding, a recent stroke, or have a history of bleeding problems. · This medicine can cause you to bleed and bruise more easily. Take precautions to avoid injury. Brush and floss your teeth gently, do not play rough sports, and be careful with sharp objects. Severe bleeding can be life-threatening. · This medicine may cause a rare but serious blood clotting condition called thrombotic thrombocytopenic purpura.   · Make sure any doctor or dentist who treats you knows that you are using this medicine. Tell your doctor if you plan to have surgery or a dental procedure. · Do not stop using this medicine suddenly. Your doctor will need to slowly decrease your dose before you stop it completely. · Your doctor will check your progress and the effects of this medicine at regular visits. Keep all appointments. · Keep all medicine out of the reach of children. Never share your medicine with anyone. Possible Side Effects While Using This Medicine:   Call your doctor right away if you notice any of these side effects:  · Allergic reaction: Itching or hives, swelling in your face or hands, swelling or tingling in your mouth or throat, chest tightness, trouble breathing  · Bloody or black, tarry stools  · Nosebleeds  · Pinpoint red or purple spots on your skin or in your mouth  · Problems with vision, speech, or walking  · Red or dark brown urine  · Seizures  · Severe stomach pain  · Trouble breathing, tiredness, fast heartbeat, yellow skin or eyes  · Unusual bleeding, bruising, or weakness  · Vomiting of blood or vomit that looks like coffee grounds  If you notice other side effects that you think are caused by this medicine, tell your doctor. Call your doctor for medical advice about side effects. You may report side effects to FDA at 5-862-FDA-1150  © 2017 [facility] Information is for End User's use only and may not be sold, redistributed or otherwise used for commercial purposes. The above information is an  only. It is not intended as medical advice for individual conditions or treatments. Talk to your doctor, nurse or pharmacist before following any medical regimen to see if it is safe and effective for you. Learning About Coronary Angioplasty  What is a coronary angioplasty? Coronary angioplasty is a procedure that uses a thin tube called a catheter to open a blocked or narrowed coronary artery.  Coronary arteries are the blood vessels that bring oxygen to the heart muscle. Angioplasty also may be called percutaneous coronary intervention (PCI). Angioplasty can widen an artery that has been narrowed by fatty buildup (plaque) or blocked by a blood clot. The procedure helps blood flow more normally to the heart muscle. How is the procedure done? Coronary angioplasty is done in a cardiac catheterization laboratory (\"cath lab\"). It is done by a heart specialist called a cardiologist. The whole procedure may take 1½ to 3 hours. You lie on a table under a large X-ray machine. You will get medicine through an IV in one of your veins. It helps you relax and not feel pain. You will be awake during the procedure. But you may not be able to remember much about it. The doctor will inject some medicine into your arm or groin to numb the skin. You will feel a small needle poke you. It's like having a blood test. You may feel some pressure when the doctor puts in the catheter. But you will not feel pain. The doctor will look at X-ray pictures on a monitor (like a TV screen) to move the catheter to your heart. The doctor then puts a dye into the catheter. This makes your heart's arteries show up on a screen. The doctor can then see any arteries that are blocked or narrowed. You may feel warm or flushed for a short time when the doctor injects dye into your artery. If you have a blocked or narrow artery, the doctor uses a catheter with a tiny balloon at the tip. The doctor puts it into the blocked or narrow area and inflates it. The balloon presses the fatty buildup against the walls of the artery. This buildup is called plaque. This creates more room for blood to flow. In most cases, the doctor then puts a stent in the artery. A stent is a small, expandable tube. It presses against the walls of the artery. The stent is left in the artery to keep the artery open. This helps blood flow. The catheter is removed from your body.   What happens right after the procedure? The catheter will be removed. A nurse or doctor may press on a bandage on the opening. Then a bandage or a compression device may be placed on your groin or wrist at the catheter insertion site. This prevents bleeding. After the procedure, you will be taken to a room where the catheter site and your heart rate, blood pressure, and temperature will be checked several times. If the catheter was put in your groin, you will need to lie still and keep your leg straight for several hours. If the catheter was put in your arm, you may need to keep your arm still for at least 2 hours. You may go home the same day. Or you may stay at least 1 night in the hospital. When you go home, you will get instructions from your doctor to help you recover well and prevent problems. Make sure to drink plenty of fluids (unless your doctor tells you not to) for several hours after the procedure. This will help flush the dye out of your body. Follow-up care is a key part of your treatment and safety. Be sure to make and go to all appointments, and call your doctor if you are having problems. It's also a good idea to know your test results and keep a list of the medicines you take. Where can you learn more? Go to http://www.gray.com/  Enter M058 in the search box to learn more about \"Learning About Coronary Angioplasty. \"  Current as of: April 29, 2021               Content Version: 13.0  © 2006-2021 [employer]. Care instructions adapted under license by [employer] (which disclaims liability or warranty for this information). If you have questions about a medical condition or this instruction, always ask your healthcare professional. [place] [address] any warranty or liability for your use of this information.     Patient Education      Fluconazole (Diflucan) - (By mouth)   Why this medicine is used:   Prevents and treats fungal infections. Contact a nurse or doctor right away if you have:  · Blistering, peeling, red skin rash  · Fast, pounding, or uneven heartbeat; lightheadedness or fainting  · Dark urine or pale stools, vomiting, loss of appetite  · Yellow skin or eyes     Common side effects:  · Mild nausea, vomiting, stomach pain, diarrhea  · Headache  © 2017 [facility] Information is for End User's use only and may not be sold, redistributed or otherwise used for commercial purposes.

## 2021-12-18 NOTE — PROGRESS NOTES
Pt K was 3.4 this morning and unaddressed during day shift. Messaged Cristian Moody NP. Ordered 40 mg PO.

## 2021-12-18 NOTE — ROUTINE PROCESS
I have reviewed discharge instructions with the patient. The patient verbalized understanding.     Patient to sign for discharge however signature pad will not turn on

## 2021-12-18 NOTE — PROGRESS NOTES
Pt was medically cleared for dc to home today. No dc needs or concerns identified or reported. Care Management Interventions  PCP Verified by CM: Yes ([facility])  Mode of Transport at Discharge:  Other (see comment) (Family)  Transition of Care Consult (CM Consult): Discharge Planning  Discharge Durable Medical Equipment: No  Physical Therapy Consult: No  Occupational Therapy Consult: Yes  Speech Therapy Consult: No  Support Systems: Other Family Member(s),Spouse/Significant Other,Child(sasha),Friend/Neighbor,[place]  Confirm Follow Up Transport: Family  The Plan for Transition of Care is Related to the Following Treatment Goals : Return home and back to her baseline  Discharge Location  Discharge Placement: Home

## 2021-12-18 NOTE — PROGRESS NOTES
Bedside shift change report given to self (oncoming nurse) by Rakesh Quinonez (offgoing nurse).  Report included the following information SBAR, Kardex, Procedure Summary, Intake/Output, MAR and Cardiac Rhythm SR.

## 2022-01-31 ENCOUNTER — HOSPITAL ENCOUNTER (OUTPATIENT)
Dept: GENERAL RADIOLOGY | Age: 52
Discharge: HOME | End: 2022-01-31
Payer: COMMERCIAL

## 2022-01-31 DIAGNOSIS — J45.909 UNCOMPLICATED ASTHMA, UNSPECIFIED ASTHMA SEVERITY, UNSPECIFIED WHETHER PERSISTENT: ICD-10-CM

## 2022-01-31 PROCEDURE — 71046 X-RAY EXAM CHEST 2 VIEWS: CPT

## 2022-03-18 PROBLEM — I21.19 ACUTE INFERIOR MYOCARDIAL INFARCTION (HCC): Status: ACTIVE | Noted: 2019-07-01

## 2022-03-18 PROBLEM — I25.10 CAD (CORONARY ARTERY DISEASE): Status: ACTIVE | Noted: 2021-12-15

## 2022-03-19 PROBLEM — I21.3 STEMI (ST ELEVATION MYOCARDIAL INFARCTION) (HCC): Status: ACTIVE | Noted: 2019-06-30

## 2022-03-19 PROBLEM — E87.6 HYPOKALEMIA: Status: ACTIVE | Noted: 2019-07-03

## 2022-07-22 ENCOUNTER — OFFICE VISIT (OUTPATIENT)
Dept: CARDIOLOGY CLINIC | Age: 52
End: 2022-07-22
Payer: COMMERCIAL

## 2022-07-22 VITALS
WEIGHT: 198.2 LBS | SYSTOLIC BLOOD PRESSURE: 138 MMHG | HEIGHT: 67 IN | BODY MASS INDEX: 31.11 KG/M2 | HEART RATE: 119 BPM | DIASTOLIC BLOOD PRESSURE: 92 MMHG

## 2022-07-22 DIAGNOSIS — G47.33 OSA (OBSTRUCTIVE SLEEP APNEA): ICD-10-CM

## 2022-07-22 DIAGNOSIS — I10 ESSENTIAL HYPERTENSION WITH GOAL BLOOD PRESSURE LESS THAN 140/90: ICD-10-CM

## 2022-07-22 DIAGNOSIS — I25.10 CORONARY ARTERY DISEASE INVOLVING NATIVE CORONARY ARTERY OF NATIVE HEART WITHOUT ANGINA PECTORIS: Primary | ICD-10-CM

## 2022-07-22 DIAGNOSIS — E78.5 DYSLIPIDEMIA: ICD-10-CM

## 2022-07-22 DIAGNOSIS — E11.9 TYPE 2 DIABETES MELLITUS WITHOUT COMPLICATION, WITHOUT LONG-TERM CURRENT USE OF INSULIN (HCC): ICD-10-CM

## 2022-07-22 PROCEDURE — 99214 OFFICE O/P EST MOD 30 MIN: CPT | Performed by: INTERNAL MEDICINE

## 2022-07-22 PROCEDURE — 93000 ELECTROCARDIOGRAM COMPLETE: CPT | Performed by: INTERNAL MEDICINE

## 2022-07-22 RX ORDER — DULAGLUTIDE 0.75 MG/.5ML
0.75 INJECTION, SOLUTION SUBCUTANEOUS
COMMUNITY

## 2022-07-22 RX ORDER — DULOXETIN HYDROCHLORIDE 60 MG/1
60 CAPSULE, DELAYED RELEASE ORAL DAILY
COMMUNITY
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
## 2022-07-24 ENCOUNTER — HOSPITAL ENCOUNTER (INPATIENT)
Age: 52
LOS: 2 days | Discharge: HOME | DRG: 247 | End: 2022-07-26
Attending: INTERNAL MEDICINE | Admitting: INTERNAL MEDICINE
Payer: COMMERCIAL

## 2022-07-24 DIAGNOSIS — I21.4 NSTEMI (NON-ST ELEVATED MYOCARDIAL INFARCTION) (HCC): ICD-10-CM

## 2022-07-24 DIAGNOSIS — R07.9 CHEST PAIN: ICD-10-CM

## 2022-07-24 DIAGNOSIS — I25.119 CORONARY ARTERY DISEASE INVOLVING NATIVE CORONARY ARTERY OF NATIVE HEART WITH ANGINA PECTORIS (HCC): Primary | ICD-10-CM

## 2022-07-24 PROBLEM — I25.10 CAD (CORONARY ARTERY DISEASE): Status: ACTIVE | Noted: 2021-12-15

## 2022-07-24 LAB
APTT PPP: 77.1 SEC (ref 24.1–35.1)
BASOPHILS # BLD: 0 K/UL (ref 0–0.2)
BASOPHILS NFR BLD: 0 % (ref 0–2)
DIFFERENTIAL METHOD BLD: ABNORMAL
EOSINOPHIL # BLD: 0.5 K/UL (ref 0–0.8)
EOSINOPHIL NFR BLD: 5 % (ref 0.5–7.8)
ERYTHROCYTE [DISTWIDTH] IN BLOOD BY AUTOMATED COUNT: 12.9 % (ref 11.9–14.6)
GLUCOSE BLD STRIP.AUTO-MCNC: 215 MG/DL (ref 65–100)
HCT VFR BLD AUTO: 46.8 % (ref 35.8–46.3)
HGB BLD-MCNC: 15.9 G/DL (ref 11.7–15.4)
IMM GRANULOCYTES # BLD AUTO: 0.1 K/UL (ref 0–0.5)
IMM GRANULOCYTES NFR BLD AUTO: 1 % (ref 0–5)
INR PPP: 1.1
LYMPHOCYTES # BLD: 2.6 K/UL (ref 0.5–4.6)
LYMPHOCYTES NFR BLD: 24 % (ref 13–44)
MCH RBC QN AUTO: 30.4 PG (ref 26.1–32.9)
MCHC RBC AUTO-ENTMCNC: 34 G/DL (ref 31.4–35)
MCV RBC AUTO: 89.5 FL (ref 79.6–97.8)
MONOCYTES # BLD: 0.9 K/UL (ref 0.1–1.3)
MONOCYTES NFR BLD: 9 % (ref 4–12)
NEUTS SEG # BLD: 6.6 K/UL (ref 1.7–8.2)
NEUTS SEG NFR BLD: 61 % (ref 43–78)
NRBC # BLD: 0 K/UL (ref 0–0.2)
PLATELET # BLD AUTO: 165 K/UL (ref 150–450)
PMV BLD AUTO: 12.6 FL (ref 9.4–12.3)
PROTHROMBIN TIME: 14.3 SEC (ref 12.6–14.5)
RBC # BLD AUTO: 5.23 M/UL (ref 4.05–5.2)
SERVICE CMNT-IMP: ABNORMAL
TROPONIN I SERPL HS-MCNC: 6.3 PG/ML (ref 0–14)
TROPONIN I SERPL HS-MCNC: 6.6 PG/ML (ref 0–14)
UFH PPP CHRO-ACNC: 0.27 IU/ML (ref 0.3–0.7)
WBC # BLD AUTO: 10.7 K/UL (ref 4.3–11.1)

## 2022-07-24 PROCEDURE — 2580000003 HC RX 258: Performed by: PHYSICIAN ASSISTANT

## 2022-07-24 PROCEDURE — 85610 PROTHROMBIN TIME: CPT

## 2022-07-24 PROCEDURE — 6370000000 HC RX 637 (ALT 250 FOR IP): Performed by: PHYSICIAN ASSISTANT

## 2022-07-24 PROCEDURE — 85025 COMPLETE CBC W/AUTO DIFF WBC: CPT

## 2022-07-24 PROCEDURE — 36415 COLL VENOUS BLD VENIPUNCTURE: CPT

## 2022-07-24 PROCEDURE — 94640 AIRWAY INHALATION TREATMENT: CPT

## 2022-07-24 PROCEDURE — 85520 HEPARIN ASSAY: CPT

## 2022-07-24 PROCEDURE — 85730 THROMBOPLASTIN TIME PARTIAL: CPT

## 2022-07-24 PROCEDURE — 84484 ASSAY OF TROPONIN QUANT: CPT

## 2022-07-24 PROCEDURE — 1100000003 HC PRIVATE W/ TELEMETRY

## 2022-07-24 PROCEDURE — 6360000002 HC RX W HCPCS: Performed by: PHYSICIAN ASSISTANT

## 2022-07-24 PROCEDURE — 82962 GLUCOSE BLOOD TEST: CPT

## 2022-07-24 PROCEDURE — 99223 1ST HOSP IP/OBS HIGH 75: CPT | Performed by: INTERNAL MEDICINE

## 2022-07-24 PROCEDURE — 93005 ELECTROCARDIOGRAM TRACING: CPT | Performed by: PHYSICIAN ASSISTANT

## 2022-07-24 PROCEDURE — 94760 N-INVAS EAR/PLS OXIMETRY 1: CPT

## 2022-07-24 RX ORDER — ONDANSETRON 4 MG/1
4 TABLET, ORALLY DISINTEGRATING ORAL EVERY 8 HOURS PRN
Status: DISCONTINUED | OUTPATIENT
Start: 2022-07-24 | End: 2022-07-26 | Stop reason: HOSPADM

## 2022-07-24 RX ORDER — HEPARIN SODIUM 1000 [USP'U]/ML
4000 INJECTION, SOLUTION INTRAVENOUS; SUBCUTANEOUS ONCE
Status: DISCONTINUED | OUTPATIENT
Start: 2022-07-24 | End: 2022-07-26 | Stop reason: HOSPADM

## 2022-07-24 RX ORDER — ACETAMINOPHEN 650 MG/1
650 SUPPOSITORY RECTAL EVERY 6 HOURS PRN
Status: DISCONTINUED | OUTPATIENT
Start: 2022-07-24 | End: 2022-07-26 | Stop reason: HOSPADM

## 2022-07-24 RX ORDER — RANOLAZINE 500 MG/1
500 TABLET, EXTENDED RELEASE ORAL 2 TIMES DAILY
Status: DISCONTINUED | OUTPATIENT
Start: 2022-07-24 | End: 2022-07-26 | Stop reason: HOSPADM

## 2022-07-24 RX ORDER — FLUTICASONE PROPIONATE 50 MCG
2 SPRAY, SUSPENSION (ML) NASAL PRN
Status: DISCONTINUED | OUTPATIENT
Start: 2022-07-24 | End: 2022-07-26 | Stop reason: HOSPADM

## 2022-07-24 RX ORDER — LEVALBUTEROL TARTRATE 45 UG/1
2 AEROSOL, METERED ORAL EVERY 4 HOURS PRN
Status: DISCONTINUED | OUTPATIENT
Start: 2022-07-24 | End: 2022-07-24

## 2022-07-24 RX ORDER — LORAZEPAM 0.5 MG/1
0.5 TABLET ORAL EVERY 4 HOURS PRN
Status: DISCONTINUED | OUTPATIENT
Start: 2022-07-24 | End: 2022-07-26 | Stop reason: HOSPADM

## 2022-07-24 RX ORDER — SODIUM CHLORIDE 0.9 % (FLUSH) 0.9 %
5-40 SYRINGE (ML) INJECTION PRN
Status: DISCONTINUED | OUTPATIENT
Start: 2022-07-24 | End: 2022-07-26 | Stop reason: HOSPADM

## 2022-07-24 RX ORDER — ASPIRIN 81 MG/1
81 TABLET ORAL EVERY EVENING
Status: DISCONTINUED | OUTPATIENT
Start: 2022-07-24 | End: 2022-07-26 | Stop reason: HOSPADM

## 2022-07-24 RX ORDER — NITROGLYCERIN 0.4 MG/1
0.4 TABLET SUBLINGUAL EVERY 5 MIN PRN
Status: DISCONTINUED | OUTPATIENT
Start: 2022-07-24 | End: 2022-07-26 | Stop reason: HOSPADM

## 2022-07-24 RX ORDER — HEPARIN SODIUM 1000 [USP'U]/ML
4000 INJECTION, SOLUTION INTRAVENOUS; SUBCUTANEOUS PRN
Status: DISCONTINUED | OUTPATIENT
Start: 2022-07-24 | End: 2022-07-26 | Stop reason: HOSPADM

## 2022-07-24 RX ORDER — METOPROLOL SUCCINATE 25 MG/1
50 TABLET, EXTENDED RELEASE ORAL DAILY
Status: DISCONTINUED | OUTPATIENT
Start: 2022-07-24 | End: 2022-07-26 | Stop reason: HOSPADM

## 2022-07-24 RX ORDER — ONDANSETRON 2 MG/ML
4 INJECTION INTRAMUSCULAR; INTRAVENOUS EVERY 6 HOURS PRN
Status: DISCONTINUED | OUTPATIENT
Start: 2022-07-24 | End: 2022-07-26 | Stop reason: HOSPADM

## 2022-07-24 RX ORDER — HEPARIN SODIUM 10000 [USP'U]/100ML
5-30 INJECTION, SOLUTION INTRAVENOUS CONTINUOUS
Status: DISCONTINUED | OUTPATIENT
Start: 2022-07-24 | End: 2022-07-26

## 2022-07-24 RX ORDER — ONDANSETRON 4 MG/1
4 TABLET, ORALLY DISINTEGRATING ORAL 3 TIMES DAILY PRN
Status: DISCONTINUED | OUTPATIENT
Start: 2022-07-24 | End: 2022-07-24

## 2022-07-24 RX ORDER — LOSARTAN POTASSIUM 50 MG/1
50 TABLET ORAL DAILY
Status: DISCONTINUED | OUTPATIENT
Start: 2022-07-24 | End: 2022-07-26 | Stop reason: HOSPADM

## 2022-07-24 RX ORDER — DULOXETIN HYDROCHLORIDE 60 MG/1
60 CAPSULE, DELAYED RELEASE ORAL DAILY
Status: DISCONTINUED | OUTPATIENT
Start: 2022-07-24 | End: 2022-07-26 | Stop reason: HOSPADM

## 2022-07-24 RX ORDER — ALBUTEROL SULFATE 90 UG/1
2 AEROSOL, METERED RESPIRATORY (INHALATION) EVERY 4 HOURS PRN
Status: DISCONTINUED | OUTPATIENT
Start: 2022-07-24 | End: 2022-07-26 | Stop reason: HOSPADM

## 2022-07-24 RX ORDER — CETIRIZINE HYDROCHLORIDE 10 MG/1
10 TABLET ORAL NIGHTLY
Status: DISCONTINUED | OUTPATIENT
Start: 2022-07-24 | End: 2022-07-26 | Stop reason: HOSPADM

## 2022-07-24 RX ORDER — CLOPIDOGREL BISULFATE 75 MG/1
75 TABLET ORAL DAILY
Status: DISCONTINUED | OUTPATIENT
Start: 2022-07-25 | End: 2022-07-26 | Stop reason: HOSPADM

## 2022-07-24 RX ORDER — SODIUM CHLORIDE 0.9 % (FLUSH) 0.9 %
5-40 SYRINGE (ML) INJECTION EVERY 12 HOURS SCHEDULED
Status: DISCONTINUED | OUTPATIENT
Start: 2022-07-24 | End: 2022-07-26 | Stop reason: HOSPADM

## 2022-07-24 RX ORDER — ACETAMINOPHEN 325 MG/1
650 TABLET ORAL EVERY 6 HOURS PRN
Status: DISCONTINUED | OUTPATIENT
Start: 2022-07-24 | End: 2022-07-26 | Stop reason: HOSPADM

## 2022-07-24 RX ORDER — SODIUM CHLORIDE 9 MG/ML
INJECTION, SOLUTION INTRAVENOUS PRN
Status: DISCONTINUED | OUTPATIENT
Start: 2022-07-24 | End: 2022-07-26

## 2022-07-24 RX ORDER — POTASSIUM CHLORIDE 20 MEQ/1
20 TABLET, EXTENDED RELEASE ORAL DAILY
Status: DISCONTINUED | OUTPATIENT
Start: 2022-07-24 | End: 2022-07-26 | Stop reason: HOSPADM

## 2022-07-24 RX ORDER — HEPARIN SODIUM 1000 [USP'U]/ML
2000 INJECTION, SOLUTION INTRAVENOUS; SUBCUTANEOUS PRN
Status: DISCONTINUED | OUTPATIENT
Start: 2022-07-24 | End: 2022-07-26 | Stop reason: HOSPADM

## 2022-07-24 RX ORDER — POLYETHYLENE GLYCOL 3350 17 G/17G
17 POWDER, FOR SOLUTION ORAL DAILY PRN
Status: DISCONTINUED | OUTPATIENT
Start: 2022-07-24 | End: 2022-07-26 | Stop reason: HOSPADM

## 2022-07-24 RX ADMIN — HEPARIN SODIUM 2000 UNITS: 1000 INJECTION INTRAVENOUS; SUBCUTANEOUS at 23:53

## 2022-07-24 RX ADMIN — MOMETASONE FUROATE AND FORMOTEROL FUMARATE DIHYDRATE 2 PUFF: 200; 5 AEROSOL RESPIRATORY (INHALATION) at 19:26

## 2022-07-24 RX ADMIN — ASPIRIN 81 MG: 81 TABLET ORAL at 18:15

## 2022-07-24 RX ADMIN — RANOLAZINE 500 MG: 500 TABLET, FILM COATED, EXTENDED RELEASE ORAL at 20:25

## 2022-07-24 RX ADMIN — CETIRIZINE HYDROCHLORIDE 10 MG: 10 TABLET ORAL at 20:25

## 2022-07-24 RX ADMIN — SODIUM CHLORIDE, PRESERVATIVE FREE 5 ML: 5 INJECTION INTRAVENOUS at 20:25

## 2022-07-24 RX ADMIN — LOSARTAN POTASSIUM 50 MG: 50 TABLET, FILM COATED ORAL at 18:15

## 2022-07-24 ASSESSMENT — PAIN SCALES - GENERAL: PAINLEVEL_OUTOF10: 0

## 2022-07-24 NOTE — H&P
[address] [place] [address]  HISTORY AND PHYSICAL    Name:  Amparo Zayas  MR#:  [id number]  :  1970  ACCOUNT #:  [de-identified]  ADMIT DATE:  2022      ATTENDING CARDIOLOGIST:  Brent Mata MD    CHIEF COMPLAINT:  Abdominal pain/chest pain. HISTORY OF PRESENT ILLNESS:  The patient is a 59-year-old  female who is followed in our office by Dr. Christie Baltazar who in fact was seen by Dr. Evan Cleveland on 2022 and thought to be doing reasonably well. She states that she developed a diffuse abdominal pain with nausea and abdominal distention which required emergency room evaluation at the [address]. She was subsequently admitted to their ICU after her initial troponin was said to be mildly elevated at 0.4. She was placed on a heparin drip. The patient's initial troponin level was 0.41. She had no acute ST-T changes. She states that her abdominal pain improved. However then, she developed a 20-minute episode of severe retrosternal chest pressure while in the emergency room department. Their evaluation included a chest CT which showed no evidence of a pulmonary embolus. However, diffuse bilateral pulmonary nodules were described suggesting possible metastatic disease. Her abdominal CT scan showed no acute findings. The patient was admitted for possible unstable angina and cardiac catheterization was suggested. She states that she did not wish this performed at the [facility] and wanted to be transferred to our hospital for further evaluation. The patient states that presently, she does not have any abdominal or chest discomfort noted. PAST MEDICAL HISTORY:  coronary artery disease. The patient's last cardiac catheterization was on 2021. She received tandem stenting x2 to the mid LAD with 2.0 x 50 mm Rubén drug-eluting stent.   She also had stenting of the ostial posterior lateral branch with a 2.5 x 8 mm Rubén drug-eluting stent. She was said to have preserved LV function at that time. ADDITIONAL PAST MEDICAL HISTORY:  1.  CAD. 2.  Obesity. 3.  Diabetes. 4.  GERD. PAST SURGICAL HISTORY:  Cholecystectomy, hysterectomy, septoplasty, bilateral carpal tunnel release, right great toe surgery. FAMILY HISTORY:  Her mother is alive at [de-identified]years of age with a history of CAD. Father  at 40years of age secondary to an accident. ALLERGIES:  ROSUVASTATIN, TRAMADOL, KEFLEX, PENICILLIN, TICAGRELOR, AND BUTORPHANOL. CURRENT HOME MEDS:  1. Cymbalta 60 mg daily. 2.  Trulicity 0.04 mg q. 10 days. 3.  Aspirin 81 mg daily. 4.  Hygroton 25 mg daily. 5.  Plavix 75 mg q. day. 6.  Jardiance 25 mg daily. 7.  Repatha 140 mg subcu q. 14 days. 8.  Ranexa 500 mg twice a day. 9.  Losartan 50 mg daily. REVIEW OF SYSTEMS:  SKIN:  The patient denies rash. NEUROLOGY:  The patient denies stroke or seizure. PSYCH:  The patient has history of anxiety. HEENT:  The patient denies headache. PULMONARY:  The patient denies productive cough, fever, chills. GI:  The patient denies diarrhea, melena, hematochezia. :  The patient denies hematuria, dysuria. MUSCULOSKELETAL:  The patient denies fall or fractures. PHYSICAL EXAMINATION:  VITAL SIGNS:  Temperature is 98.8, pulse is 94, respirations 18, blood pressure 101/66, O2 saturation 97%. GENERAL:  The patient is a pleasant middle-aged female, in no acute distress. SKIN:  Warm and dry. NEUROLOGY:  Alert and oriented. PSYCH:  Normal mood and affect. HEENT:  Normocephalic, atraumatic. Pupils reactive. NECK:  Supple. 2+ carotid upstrokes without bruits. CHEST:  Clear. CARDIAC:  Exam regular rate and rhythm without murmur, rub or gallop. ABDOMEN:  Abdomen is obese with positive bowel sounds. No masses were felt. EXTREMITIES:  Shows no edema, cyanosis, clubbing. Pulses 1 to 2+ upper and lower extremities bilateral and equal without bruits.     IMPRESSION AND

## 2022-07-24 NOTE — Clinical Note
TRANSFER - OUT REPORT:     Verbal report given to: CPRU. Report consisted of patient's Situation, Background, Assessment and   Recommendations(SBAR). Opportunity for questions and clarification was provided. Patient transported with a Registered Nurse and [address] [place] [address] / [facility] [employer].

## 2022-07-24 NOTE — Clinical Note
TRANSFER - IN REPORT:     Verbal report received from: . Report consisted of patient's Situation, Background, Assessment and   Recommendations(SBAR). Opportunity for questions and clarification was provided. Assessment completed upon patient's arrival to unit and care assumed. Patient transported with a Registered Nurse and [address] [place] [address] / [facility].

## 2022-07-24 NOTE — H&P
[facility] CARDIOLOGY PROGRESS NOTE           7/24/2022 6:17 PM    Admit Date: 7/24/2022      Subjective:   Patient seen and examined. H&P dictated. BNQ#:[id number]. ROS:  GEN:  No fever or chills  Cardiovascular:  As noted above:CP resolved  Pulmonary:  As noted above:no SOB  Neuro:  No new focal motor or sensory loss    Objective:      Vitals:    07/24/22 1700   BP: 101/66   Pulse: 94   Resp: 18   Temp: 97.8 °F (36.6 °C)   TempSrc: Temporal   SpO2: 97%       Physical Exam:  General-NAD  Neck- supple, no JVD  CV- regular rate and rhythm no MRG  Lung- clear bilaterally  Abd- soft, nontender, nondistended  Ext- no edema bilaterally. Skin- warm and dry  Psychiatric:  Normal mood and affect. Neurologic:  Alert and oriented X 3      Data Review:   Recent Labs     07/24/22  1730   WBC 10.7   HGB 15.9*   HCT 46.8*          TELEMETRY: NSR    Assessment/Plan:     Principal Problem:    NSTEMI (non-ST elevated myocardial infarction) (HCC):Repeat cardiac enzymes,BB,ARB,Heparin,and DAPT. cONSIDER lhc ON 7/25/22  Active Problems:    Essential hypertension with goal blood pressure less than 140/90:Continue BB +ARB. CAD (coronary artery disease):Consider repeat LHC,Continue DAPT,ARB,BB,and heparin. Type 2 diabetes mellitus without complication ([facility] [address]    Dyslipidemia:Continue Repatha.               Yecenia Lamas MD  7/24/2022 6:17 PM

## 2022-07-25 ENCOUNTER — APPOINTMENT (OUTPATIENT)
Dept: NON INVASIVE DIAGNOSTICS | Age: 52
DRG: 247 | End: 2022-07-25
Attending: INTERNAL MEDICINE
Payer: COMMERCIAL

## 2022-07-25 ENCOUNTER — TELEPHONE (OUTPATIENT)
Dept: SLEEP MEDICINE | Age: 52
End: 2022-07-25

## 2022-07-25 LAB
ANION GAP SERPL CALC-SCNC: 7 MMOL/L (ref 7–16)
BUN SERPL-MCNC: 18 MG/DL (ref 6–23)
CALCIUM SERPL-MCNC: 7.9 MG/DL (ref 8.3–10.4)
CHLORIDE SERPL-SCNC: 107 MMOL/L (ref 98–107)
CHOLEST SERPL-MCNC: 145 MG/DL
CO2 SERPL-SCNC: 30 MMOL/L (ref 21–32)
CREAT SERPL-MCNC: 0.7 MG/DL (ref 0.6–1)
ECHO AO ASC DIAM: 3 CM
ECHO AO ASCENDING AORTA INDEX: 1.5 CM/M2
ECHO AO ROOT DIAM: 3.1 CM
ECHO AO ROOT INDEX: 1.55 CM/M2
ECHO AV AREA PEAK VELOCITY: 1.6 CM2
ECHO AV AREA VTI: 1.8 CM2
ECHO AV AREA/BSA PEAK VELOCITY: 0.8 CM2/M2
ECHO AV AREA/BSA VTI: 0.9 CM2/M2
ECHO AV MEAN GRADIENT: 6 MMHG
ECHO AV MEAN VELOCITY: 1.2 M/S
ECHO AV PEAK GRADIENT: 13 MMHG
ECHO AV PEAK VELOCITY: 1.8 M/S
ECHO AV VELOCITY RATIO: 0.5
ECHO AV VTI: 34.1 CM
ECHO BSA: 2.05 M2
ECHO BSA: 2.05 M2
ECHO EST RA PRESSURE: 3 MMHG
ECHO IVC PROX: 1.7 CM
ECHO LA AREA 2C: 16.5 CM2
ECHO LA AREA 4C: 14.8 CM2
ECHO LA DIAMETER INDEX: 1.6 CM/M2
ECHO LA DIAMETER: 3.2 CM
ECHO LA MAJOR AXIS: 5.6 CM
ECHO LA MINOR AXIS: 5.5 CM
ECHO LA TO AORTIC ROOT RATIO: 1.03
ECHO LA VOL BP: 35 ML (ref 22–52)
ECHO LA VOL/BSA BIPLANE: 18 ML/M2 (ref 16–34)
ECHO LV E' LATERAL VELOCITY: 11 CM/S
ECHO LV E' SEPTAL VELOCITY: 8 CM/S
ECHO LV EDV A2C: 114 ML
ECHO LV EDV A4C: 115 ML
ECHO LV EDV INDEX A4C: 58 ML/M2
ECHO LV EDV NDEX A2C: 57 ML/M2
ECHO LV EJECTION FRACTION A2C: 63 %
ECHO LV EJECTION FRACTION A4C: 61 %
ECHO LV EJECTION FRACTION BIPLANE: 62 % (ref 55–100)
ECHO LV ESV A2C: 42 ML
ECHO LV ESV A4C: 45 ML
ECHO LV ESV INDEX A2C: 21 ML/M2
ECHO LV ESV INDEX A4C: 23 ML/M2
ECHO LV FRACTIONAL SHORTENING: 28 % (ref 28–44)
ECHO LV INTERNAL DIMENSION DIASTOLE INDEX: 2.7 CM/M2
ECHO LV INTERNAL DIMENSION DIASTOLIC: 5.4 CM (ref 3.9–5.3)
ECHO LV INTERNAL DIMENSION SYSTOLIC INDEX: 1.95 CM/M2
ECHO LV INTERNAL DIMENSION SYSTOLIC: 3.9 CM
ECHO LV IVSD: 0.9 CM (ref 0.6–0.9)
ECHO LV MASS 2D: 167.4 G (ref 67–162)
ECHO LV MASS INDEX 2D: 83.7 G/M2 (ref 43–95)
ECHO LV POSTERIOR WALL DIASTOLIC: 0.8 CM (ref 0.6–0.9)
ECHO LV RELATIVE WALL THICKNESS RATIO: 0.3
ECHO LVOT AREA: 3.1 CM2
ECHO LVOT AV VTI INDEX: 0.57
ECHO LVOT DIAM: 2 CM
ECHO LVOT MEAN GRADIENT: 2 MMHG
ECHO LVOT PEAK GRADIENT: 3 MMHG
ECHO LVOT PEAK VELOCITY: 0.9 M/S
ECHO LVOT STROKE VOLUME INDEX: 30.5 ML/M2
ECHO LVOT SV: 60.9 ML
ECHO LVOT VTI: 19.4 CM
ECHO MV A VELOCITY: 0.97 M/S
ECHO MV AREA VTI: 2 CM2
ECHO MV E DECELERATION TIME (DT): 206 MS
ECHO MV E VELOCITY: 1.07 M/S
ECHO MV E/A RATIO: 1.1
ECHO MV E/E' LATERAL: 9.73
ECHO MV E/E' RATIO (AVERAGED): 11.55
ECHO MV E/E' SEPTAL: 13.38
ECHO MV LVOT VTI INDEX: 1.6
ECHO MV MAX VELOCITY: 1.1 M/S
ECHO MV MEAN GRADIENT: 3 MMHG
ECHO MV MEAN VELOCITY: 0.9 M/S
ECHO MV PEAK GRADIENT: 5 MMHG
ECHO MV VTI: 31.1 CM
ECHO PV ACCELERATION TIME (AT): 69 MS
ECHO PV MAX VELOCITY: 0.9 M/S
ECHO PV PEAK GRADIENT: 3 MMHG
ECHO RV BASAL DIMENSION: 3.6 CM
ECHO RV INTERNAL DIMENSION: 3 CM
ECHO RV TAPSE: 2.1 CM (ref 1.7–?)
EKG ATRIAL RATE: 88 BPM
EKG DIAGNOSIS: NORMAL
EKG P AXIS: 47 DEGREES
EKG P-R INTERVAL: 144 MS
EKG Q-T INTERVAL: 408 MS
EKG QRS DURATION: 114 MS
EKG QTC CALCULATION (BAZETT): 493 MS
EKG R AXIS: -4 DEGREES
EKG T AXIS: 36 DEGREES
EKG VENTRICULAR RATE: 88 BPM
EST. AVERAGE GLUCOSE BLD GHB EST-MCNC: 186 MG/DL
GLUCOSE BLD STRIP.AUTO-MCNC: 142 MG/DL (ref 65–100)
GLUCOSE SERPL-MCNC: 178 MG/DL (ref 65–100)
HBA1C MFR BLD: 8.1 % (ref 4.8–5.6)
HDLC SERPL-MCNC: 26 MG/DL (ref 40–60)
HDLC SERPL: 5.6 {RATIO}
LDLC SERPL CALC-MCNC: 80.4 MG/DL
POTASSIUM SERPL-SCNC: 3.1 MMOL/L (ref 3.5–5.1)
SERVICE CMNT-IMP: ABNORMAL
SODIUM SERPL-SCNC: 144 MMOL/L (ref 136–145)
TRIGL SERPL-MCNC: 193 MG/DL (ref 35–150)
TROPONIN I SERPL HS-MCNC: 6.8 PG/ML (ref 0–14)
UFH PPP CHRO-ACNC: 0.41 IU/ML (ref 0.3–0.7)
VLDLC SERPL CALC-MCNC: 38.6 MG/DL (ref 6–23)

## 2022-07-25 PROCEDURE — 85520 HEPARIN ASSAY: CPT

## 2022-07-25 PROCEDURE — 94760 N-INVAS EAR/PLS OXIMETRY 1: CPT

## 2022-07-25 PROCEDURE — C1769 GUIDE WIRE: HCPCS | Performed by: INTERNAL MEDICINE

## 2022-07-25 PROCEDURE — 93306 TTE W/DOPPLER COMPLETE: CPT | Performed by: INTERNAL MEDICINE

## 2022-07-25 PROCEDURE — 027034Z DILATION OF CORONARY ARTERY, ONE ARTERY WITH DRUG-ELUTING INTRALUMINAL DEVICE, PERCUTANEOUS APPROACH: ICD-10-PCS | Performed by: INTERNAL MEDICINE

## 2022-07-25 PROCEDURE — 6370000000 HC RX 637 (ALT 250 FOR IP): Performed by: PHYSICIAN ASSISTANT

## 2022-07-25 PROCEDURE — 99152 MOD SED SAME PHYS/QHP 5/>YRS: CPT | Performed by: INTERNAL MEDICINE

## 2022-07-25 PROCEDURE — 4A023N7 MEASUREMENT OF CARDIAC SAMPLING AND PRESSURE, LEFT HEART, PERCUTANEOUS APPROACH: ICD-10-PCS | Performed by: INTERNAL MEDICINE

## 2022-07-25 PROCEDURE — 6360000004 HC RX CONTRAST MEDICATION: Performed by: INTERNAL MEDICINE

## 2022-07-25 PROCEDURE — C1887 CATHETER, GUIDING: HCPCS | Performed by: INTERNAL MEDICINE

## 2022-07-25 PROCEDURE — 93452 LEFT HRT CATH W/VENTRCLGRPHY: CPT | Performed by: INTERNAL MEDICINE

## 2022-07-25 PROCEDURE — 2580000003 HC RX 258: Performed by: INTERNAL MEDICINE

## 2022-07-25 PROCEDURE — 2580000003 HC RX 258: Performed by: PHYSICIAN ASSISTANT

## 2022-07-25 PROCEDURE — 6370000000 HC RX 637 (ALT 250 FOR IP): Performed by: INTERNAL MEDICINE

## 2022-07-25 PROCEDURE — 83036 HEMOGLOBIN GLYCOSYLATED A1C: CPT

## 2022-07-25 PROCEDURE — B2111ZZ FLUOROSCOPY OF MULTIPLE CORONARY ARTERIES USING LOW OSMOLAR CONTRAST: ICD-10-PCS | Performed by: INTERNAL MEDICINE

## 2022-07-25 PROCEDURE — 2500000003 HC RX 250 WO HCPCS: Performed by: INTERNAL MEDICINE

## 2022-07-25 PROCEDURE — 80048 BASIC METABOLIC PNL TOTAL CA: CPT

## 2022-07-25 PROCEDURE — C1894 INTRO/SHEATH, NON-LASER: HCPCS | Performed by: INTERNAL MEDICINE

## 2022-07-25 PROCEDURE — 6360000002 HC RX W HCPCS: Performed by: INTERNAL MEDICINE

## 2022-07-25 PROCEDURE — C8929 TTE W OR WO FOL WCON,DOPPLER: HCPCS

## 2022-07-25 PROCEDURE — 82962 GLUCOSE BLOOD TEST: CPT

## 2022-07-25 PROCEDURE — C1725 CATH, TRANSLUMIN NON-LASER: HCPCS | Performed by: INTERNAL MEDICINE

## 2022-07-25 PROCEDURE — 80061 LIPID PANEL: CPT

## 2022-07-25 PROCEDURE — C9600 PERC DRUG-EL COR STENT SING: HCPCS | Performed by: INTERNAL MEDICINE

## 2022-07-25 PROCEDURE — 93458 L HRT ARTERY/VENTRICLE ANGIO: CPT | Performed by: INTERNAL MEDICINE

## 2022-07-25 PROCEDURE — 99232 SBSQ HOSP IP/OBS MODERATE 35: CPT | Performed by: INTERNAL MEDICINE

## 2022-07-25 PROCEDURE — 92928 PRQ TCAT PLMT NTRAC ST 1 LES: CPT | Performed by: INTERNAL MEDICINE

## 2022-07-25 PROCEDURE — 1100000003 HC PRIVATE W/ TELEMETRY

## 2022-07-25 PROCEDURE — B2151ZZ FLUOROSCOPY OF LEFT HEART USING LOW OSMOLAR CONTRAST: ICD-10-PCS | Performed by: INTERNAL MEDICINE

## 2022-07-25 PROCEDURE — C1874 STENT, COATED/COV W/DEL SYS: HCPCS | Performed by: INTERNAL MEDICINE

## 2022-07-25 PROCEDURE — 84484 ASSAY OF TROPONIN QUANT: CPT

## 2022-07-25 PROCEDURE — 36415 COLL VENOUS BLD VENIPUNCTURE: CPT

## 2022-07-25 PROCEDURE — 94640 AIRWAY INHALATION TREATMENT: CPT

## 2022-07-25 PROCEDURE — 2709999900 HC NON-CHARGEABLE SUPPLY: Performed by: INTERNAL MEDICINE

## 2022-07-25 DEVICE — STENT RONYX20018UX RESOLUTE ONYX 2.00X18
Type: IMPLANTABLE DEVICE | Status: FUNCTIONAL
Brand: RESOLUTE ONYX™

## 2022-07-25 RX ORDER — LIDOCAINE HYDROCHLORIDE 10 MG/ML
INJECTION, SOLUTION INFILTRATION; PERINEURAL PRN
Status: DISCONTINUED | OUTPATIENT
Start: 2022-07-25 | End: 2022-07-25 | Stop reason: HOSPADM

## 2022-07-25 RX ORDER — PHENYLEPHRINE HYDROCHLORIDE 10 MG/ML
INJECTION INTRAVENOUS PRN
Status: DISCONTINUED | OUTPATIENT
Start: 2022-07-25 | End: 2022-07-25 | Stop reason: HOSPADM

## 2022-07-25 RX ORDER — SODIUM CHLORIDE 0.9 % (FLUSH) 0.9 %
5-40 SYRINGE (ML) INJECTION PRN
Status: DISCONTINUED | OUTPATIENT
Start: 2022-07-25 | End: 2022-07-26 | Stop reason: HOSPADM

## 2022-07-25 RX ORDER — SODIUM CHLORIDE 9 MG/ML
INJECTION, SOLUTION INTRAVENOUS CONTINUOUS
Status: DISCONTINUED | OUTPATIENT
Start: 2022-07-25 | End: 2022-07-26

## 2022-07-25 RX ORDER — ASPIRIN 81 MG/1
81 TABLET, CHEWABLE ORAL ONCE
Status: COMPLETED | OUTPATIENT
Start: 2022-07-25 | End: 2022-07-25

## 2022-07-25 RX ORDER — CLOPIDOGREL BISULFATE 75 MG/1
TABLET ORAL PRN
Status: DISCONTINUED | OUTPATIENT
Start: 2022-07-25 | End: 2022-07-25 | Stop reason: HOSPADM

## 2022-07-25 RX ORDER — BIVALIRUDIN 250 MG/5ML
INJECTION, POWDER, LYOPHILIZED, FOR SOLUTION INTRAVENOUS PRN
Status: DISCONTINUED | OUTPATIENT
Start: 2022-07-25 | End: 2022-07-25 | Stop reason: HOSPADM

## 2022-07-25 RX ORDER — LOPERAMIDE HCL 1 MG/7.5ML
4 SOLUTION ORAL 3 TIMES DAILY PRN
Status: DISCONTINUED | OUTPATIENT
Start: 2022-07-25 | End: 2022-07-25

## 2022-07-25 RX ORDER — SODIUM CHLORIDE 0.9 % (FLUSH) 0.9 %
5-40 SYRINGE (ML) INJECTION EVERY 12 HOURS SCHEDULED
Status: DISCONTINUED | OUTPATIENT
Start: 2022-07-25 | End: 2022-07-26 | Stop reason: HOSPADM

## 2022-07-25 RX ORDER — HEPARIN SODIUM 200 [USP'U]/100ML
INJECTION, SOLUTION INTRAVENOUS CONTINUOUS PRN
Status: COMPLETED | OUTPATIENT
Start: 2022-07-25 | End: 2022-07-25

## 2022-07-25 RX ORDER — ACETAMINOPHEN 325 MG/1
650 TABLET ORAL EVERY 4 HOURS PRN
Status: DISCONTINUED | OUTPATIENT
Start: 2022-07-25 | End: 2022-07-26 | Stop reason: HOSPADM

## 2022-07-25 RX ORDER — MAGNESIUM HYDROXIDE/ALUMINUM HYDROXICE/SIMETHICONE 120; 1200; 1200 MG/30ML; MG/30ML; MG/30ML
SUSPENSION ORAL PRN
Status: DISCONTINUED | OUTPATIENT
Start: 2022-07-25 | End: 2022-07-25 | Stop reason: HOSPADM

## 2022-07-25 RX ORDER — LOPERAMIDE HYDROCHLORIDE 2 MG/1
4 CAPSULE ORAL 3 TIMES DAILY PRN
Status: DISCONTINUED | OUTPATIENT
Start: 2022-07-25 | End: 2022-07-26 | Stop reason: HOSPADM

## 2022-07-25 RX ORDER — MIDAZOLAM HYDROCHLORIDE 1 MG/ML
INJECTION INTRAMUSCULAR; INTRAVENOUS PRN
Status: DISCONTINUED | OUTPATIENT
Start: 2022-07-25 | End: 2022-07-25 | Stop reason: HOSPADM

## 2022-07-25 RX ADMIN — LOPERAMIDE HYDROCHLORIDE 4 MG: 2 CAPSULE ORAL at 08:15

## 2022-07-25 RX ADMIN — LOPERAMIDE HYDROCHLORIDE 4 MG: 2 CAPSULE ORAL at 20:47

## 2022-07-25 RX ADMIN — MOMETASONE FUROATE AND FORMOTEROL FUMARATE DIHYDRATE 2 PUFF: 200; 5 AEROSOL RESPIRATORY (INHALATION) at 19:20

## 2022-07-25 RX ADMIN — SODIUM CHLORIDE: 900 INJECTION, SOLUTION INTRAVENOUS at 12:37

## 2022-07-25 RX ADMIN — DULOXETINE HYDROCHLORIDE 60 MG: 60 CAPSULE, DELAYED RELEASE ORAL at 08:16

## 2022-07-25 RX ADMIN — ASPIRIN 81 MG: 81 TABLET, CHEWABLE ORAL at 08:16

## 2022-07-25 RX ADMIN — MOMETASONE FUROATE AND FORMOTEROL FUMARATE DIHYDRATE 2 PUFF: 200; 5 AEROSOL RESPIRATORY (INHALATION) at 07:39

## 2022-07-25 RX ADMIN — LOSARTAN POTASSIUM 50 MG: 50 TABLET, FILM COATED ORAL at 08:16

## 2022-07-25 RX ADMIN — SODIUM CHLORIDE, PRESERVATIVE FREE 5 ML: 5 INJECTION INTRAVENOUS at 20:48

## 2022-07-25 RX ADMIN — LORAZEPAM 0.5 MG: 0.5 TABLET ORAL at 20:47

## 2022-07-25 RX ADMIN — CLOPIDOGREL BISULFATE 75 MG: 75 TABLET ORAL at 08:16

## 2022-07-25 RX ADMIN — SODIUM CHLORIDE 75 ML/HR: 9 INJECTION, SOLUTION INTRAVENOUS at 01:40

## 2022-07-25 RX ADMIN — CETIRIZINE HYDROCHLORIDE 10 MG: 10 TABLET ORAL at 20:47

## 2022-07-25 RX ADMIN — PERFLUTREN 0.45 ML: 6.52 INJECTION, SUSPENSION INTRAVENOUS at 09:15

## 2022-07-25 RX ADMIN — SODIUM CHLORIDE, PRESERVATIVE FREE 5 ML: 5 INJECTION INTRAVENOUS at 08:24

## 2022-07-25 RX ADMIN — RANOLAZINE 500 MG: 500 TABLET, FILM COATED, EXTENDED RELEASE ORAL at 20:47

## 2022-07-25 RX ADMIN — POTASSIUM CHLORIDE 20 MEQ: 20 TABLET, EXTENDED RELEASE ORAL at 08:16

## 2022-07-25 RX ADMIN — METOPROLOL SUCCINATE 50 MG: 25 TABLET, EXTENDED RELEASE ORAL at 09:52

## 2022-07-25 RX ADMIN — RANOLAZINE 500 MG: 500 TABLET, FILM COATED, EXTENDED RELEASE ORAL at 08:16

## 2022-07-25 ASSESSMENT — PAIN SCALES - GENERAL
PAINLEVEL_OUTOF10: 0

## 2022-07-25 NOTE — TELEPHONE ENCOUNTER
Patient had to received one days worth of her med early and FÁTIMA from [address] wanted to let us know that she said she did not spill it accidentally take it . That it was just short . Nia Anaya went ahead and gave it to her but it will make her short next time August 17th

## 2022-07-25 NOTE — PROGRESS NOTES
TRANSFER - IN REPORT:    Verbal report received from ALEX Cortez on [employer] being received from [address] [place] [address] for routine post-op      Report consisted of patients Situation, Background, Assessment and Recommendations(SBAR). Information from the following report(s) SBAR, Procedure Summary, and MAR was reviewed with the receiving nurse. Opportunity for questions and clarification was provided. Assessment completed upon patients arrival to unit and care assumed.

## 2022-07-25 NOTE — PLAN OF CARE
Problem: Discharge Planning  Goal: Discharge to home or other facility with appropriate resources  Outcome: Progressing  Flowsheets (Taken 7/24/2022 2029)  Discharge to home or other facility with appropriate resources:   Identify barriers to discharge with patient and caregiver   Identify discharge learning needs (meds, wound care, etc)   Refer to discharge planning if patient needs post-hospital services based on physician order or complex needs related to functional status, cognitive ability or social support system   Arrange for needed discharge resources and transportation as appropriate   Arrange for interpreters to assist at discharge as needed     Problem: Pain  Goal: Verbalizes/displays adequate comfort level or baseline comfort level  Outcome: Progressing     Problem: Safety - Adult  Goal: Free from fall injury  Outcome: Progressing

## 2022-07-25 NOTE — DISCHARGE INSTRUCTIONS
HEART CATHETERIZATION/ANGIOGRAPHY DISCHARGE INSTRUCTIONS    Check puncture site frequently for swelling or bleeding. If there is any bleeding, apply pressure over the area with a clean towel or washcloth and call 911. Notify your doctor for any redness, swelling, drainage, or oozing from the puncture site. Notify your doctor for any fever or chills. If the extremity becomes cold, numb, or painful call [facility] [phone]  Activity should be limited for the next 48 hours. No heavy lifting, pushing, pulling  or strenuous activity for 48 hours. No heavy lifting (anything over 10 pounds) for 3 days. You may resume your usual diet. Drink more fluids than usual.  Have a responsible person drive you home and stay with you for at least 24 hours after your heart catheterization/angiography. You may remove bandage from your Right and Arm in 24 hours. You may shower in 24 hours. No tub baths, hot tubs, or swimming for 1 week. Do not place any lotions, creams, powders, or ointments over puncture site for 1 week. You may place a clean band-aid over the puncture site each day for 5 days. Change daily. Sedation for a Medical Procedure: Care Instructions     You were given a sedative medication during your visit. While many of the effects will have worn   off before you leave; you may continue to feel some effects for several hours. Common side effects from sedation include:  Feeling sleepy. (Your doctors and nurses will make sure you are not too sleepy to go home.)  Nausea and vomiting. This usually does not last long. Feeling tired. How can you care for yourself at home? Activity    Don't do anything for 24 hours that requires attention to detail. It takes time for the medicine effects to completely wear off. Do not make important legal decisions for 24 hours. Do not sign any legal documents for 24 hours.      Do not drink alcohol today     For your safety, you should not drive or operate heavy machinery for the remainder of the day     Rest when you feel tired. Getting enough sleep will help you recover. Diet    You can eat your normal diet, unless your doctor gives you other instructions. If your stomach is upset, try clear liquids and bland, low-fat foods like plain toast or rice. Drink plenty of fluids (unless your doctor tells you not to). Don't drink alcohol for 24 hours. Medicines    Be safe with medicines. Read and follow all instructions on the label. If the doctor gave you a prescription medicine for pain, take it as prescribed. If you are not taking a prescription pain medicine, ask your doctor if you can take an over-the-counter medicine. If you think your pain medicine is making you sick to your stomach: Take your medicine after meals (unless your doctor has told you not to). Ask your doctor for a different pain medicine.

## 2022-07-25 NOTE — PROGRESS NOTES
[facility] PROGRESS NOTE           7/25/2022 8:09 AM    Admit Date: 7/24/2022      Subjective:   Patient states chest pain is better this morning. High-sensitivity troponins here are negative x3. CT at [address] [place] [address] demonstrates diffuse pattern pulmonary nodules worrisome for metastatic disease. She continues to complain of some abdominal discomfort and has had diarrhea this morning. ROS:  Cardiovascular:  As noted above    Objective:      Vitals:    07/24/22 1928 07/24/22 2018 07/25/22 0026 07/25/22 0508   BP:  102/65 101/60 94/61   Pulse: 86 83 76 79   Resp: 18 20 18 20   Temp:  98.2 °F (36.8 °C) 97.3 °F (36.3 °C) 99 °F (37.2 °C)   TempSrc:  Temporal Temporal Temporal   SpO2: 97% 95% 91% 90%   Weight:  195 lb 8.8 oz (88.7 kg)  195 lb 8.8 oz (88.7 kg)   Height:  5' 7\" (1.702 m)         Physical Exam:  General-No Acute Distress  Neck- supple, no JVD  CV- regular rate and rhythm no MRG  Lung- clear bilaterally  Abd- soft, nontender, nondistended  Ext- no edema bilaterally. Skin- warm and dry    Data Review:   Recent Labs     07/24/22  1726 07/24/22  1730 07/25/22  0202   NA  --   --  144   K  --   --  3.1*   BUN  --   --  18   WBC  --  10.7  --    HGB  --  15.9*  --    HCT  --  46.8*  --    PLT  --  165  --    INR 1.1  --   --        Assessment/Plan:     Principal Problem:    NSTEMI (non-ST elevated myocardial infarction) ([facility] [address]  Plan: Elevated troponin at [facility]. High-sensitivity troponin x3 is negative here. She remains on IV heparin. Awaiting echocardiogram and will proceed with cardiac catheterization due to diffuse pattern severe CAD by cardiac catheterization 12/2021. Further recommendations pending clinical findings cardiac catheterization. Active Problems:    Essential hypertension with goal blood pressure less than 140/90  Plan: Blood pressure currently well controlled and running low at times. Patient is getting IV fluids.   We will continue losartan and metoprolol succinate. CAD (coronary artery disease)  Plan: Patient with established coronary artery disease and complex diabetic diffuse pattern CAD with PCI stent to the LAD and posterolateral branch 12/2021. We will proceed with cardiac catheterization. Type 2 diabetes mellitus without complication ([facility] [address]  Plan: We will check hemoglobin A1c. We will treat with sliding scale insulin. Monitor for now. Dyslipidemia  Plan: Check fasting lipid profile. LDL was markedly elevated 12/2021. No recent labs available in chart.         Juan Giron MD  7/25/2022 8:09 AM

## 2022-07-25 NOTE — CARE COORDINATION
Pt was admitted for possible unstable angina and cardiac catheterization was suggested. Pt was initially admitted at [facility] but requested to be transferred to [facility] for continuation of care. Pt lives with her spouse in a private residence. PTA, pt fairly indep with her ADLs. Works [employer] and is able to drive. Reportedly, due to her increasing health concerns is struggling to maintain employment and may pursue LTD. Denies DME use. On RA. Denies external services, such as HH or STR. PCP confirmed. Insurance verified and able to afford her meds. Will continue to follow. 07/25/22 1621   Service Assessment   Patient Orientation Alert and Oriented   Cognition Alert   History Provided By Patient   Primary Caregiver Self   Support Systems Spouse/Significant Other;Children;Family Members;[employer];Friends/Neighbors   PCP Verified by CM Yes  Roberta Hu)   Prior Functional Level Independent in ADLs/IADLs   Current Functional Level Independent in ADLs/IADLs   Can patient return to prior living arrangement Yes   Ability to make needs known: Good   Family able to assist with home care needs: Yes   Social/Functional History   Lives With Spouse   ADL Assistance Independent   Active  Yes   Mode of Transportation Car   Occupation Full time employment   Discharge Planning   Living Arrangements Spouse/Significant Other   Current Services Prior To Admission None   Potential Assistance Needed N/A   Type of Home Care Services None   Patient expects to be discharged to: [address] Discharge   Services [address] Discharge None   [employer] Information Provided?  No   Mode of Transport at Discharge Other (see comment)  (Family)

## 2022-07-26 ENCOUNTER — TELEPHONE (OUTPATIENT)
Dept: SLEEP MEDICINE | Age: 52
End: 2022-07-26

## 2022-07-26 VITALS
SYSTOLIC BLOOD PRESSURE: 116 MMHG | HEIGHT: 67 IN | HEART RATE: 85 BPM | DIASTOLIC BLOOD PRESSURE: 74 MMHG | RESPIRATION RATE: 18 BRPM | WEIGHT: 203.2 LBS | OXYGEN SATURATION: 94 % | BODY MASS INDEX: 31.89 KG/M2 | TEMPERATURE: 98.3 F

## 2022-07-26 LAB
ANION GAP SERPL CALC-SCNC: 2 MMOL/L (ref 7–16)
BUN SERPL-MCNC: 10 MG/DL (ref 6–23)
CALCIUM SERPL-MCNC: 8.1 MG/DL (ref 8.3–10.4)
CHLORIDE SERPL-SCNC: 107 MMOL/L (ref 98–107)
CO2 SERPL-SCNC: 30 MMOL/L (ref 21–32)
CREAT SERPL-MCNC: 0.7 MG/DL (ref 0.6–1)
ERYTHROCYTE [DISTWIDTH] IN BLOOD BY AUTOMATED COUNT: 12.8 % (ref 11.9–14.6)
GLUCOSE SERPL-MCNC: 143 MG/DL (ref 65–100)
HCT VFR BLD AUTO: 43 % (ref 35.8–46.3)
HGB BLD-MCNC: 14.4 G/DL (ref 11.7–15.4)
MCH RBC QN AUTO: 30.4 PG (ref 26.1–32.9)
MCHC RBC AUTO-ENTMCNC: 33.5 G/DL (ref 31.4–35)
MCV RBC AUTO: 90.7 FL (ref 79.6–97.8)
NRBC # BLD: 0 K/UL (ref 0–0.2)
PLATELET # BLD AUTO: 138 K/UL (ref 150–450)
PMV BLD AUTO: 12.5 FL (ref 9.4–12.3)
POTASSIUM SERPL-SCNC: 3.4 MMOL/L (ref 3.5–5.1)
RBC # BLD AUTO: 4.74 M/UL (ref 4.05–5.2)
SODIUM SERPL-SCNC: 139 MMOL/L (ref 136–145)
WBC # BLD AUTO: 8.9 K/UL (ref 4.3–11.1)

## 2022-07-26 PROCEDURE — 80048 BASIC METABOLIC PNL TOTAL CA: CPT

## 2022-07-26 PROCEDURE — 94760 N-INVAS EAR/PLS OXIMETRY 1: CPT

## 2022-07-26 PROCEDURE — 99238 HOSP IP/OBS DSCHRG MGMT 30/<: CPT | Performed by: INTERNAL MEDICINE

## 2022-07-26 PROCEDURE — 2580000003 HC RX 258: Performed by: INTERNAL MEDICINE

## 2022-07-26 PROCEDURE — 6370000000 HC RX 637 (ALT 250 FOR IP): Performed by: PHYSICIAN ASSISTANT

## 2022-07-26 PROCEDURE — 36415 COLL VENOUS BLD VENIPUNCTURE: CPT

## 2022-07-26 PROCEDURE — 85027 COMPLETE CBC AUTOMATED: CPT

## 2022-07-26 PROCEDURE — 2580000003 HC RX 258: Performed by: PHYSICIAN ASSISTANT

## 2022-07-26 RX ADMIN — CLOPIDOGREL BISULFATE 75 MG: 75 TABLET ORAL at 08:39

## 2022-07-26 RX ADMIN — SODIUM CHLORIDE, PRESERVATIVE FREE 5 ML: 5 INJECTION INTRAVENOUS at 08:41

## 2022-07-26 RX ADMIN — METOPROLOL SUCCINATE 50 MG: 25 TABLET, EXTENDED RELEASE ORAL at 08:39

## 2022-07-26 RX ADMIN — DULOXETINE HYDROCHLORIDE 60 MG: 60 CAPSULE, DELAYED RELEASE ORAL at 08:39

## 2022-07-26 RX ADMIN — LOSARTAN POTASSIUM 50 MG: 50 TABLET, FILM COATED ORAL at 08:39

## 2022-07-26 RX ADMIN — SODIUM CHLORIDE, PRESERVATIVE FREE 5 ML: 5 INJECTION INTRAVENOUS at 08:42

## 2022-07-26 RX ADMIN — POTASSIUM CHLORIDE 20 MEQ: 20 TABLET, EXTENDED RELEASE ORAL at 08:39

## 2022-07-26 RX ADMIN — MOMETASONE FUROATE AND FORMOTEROL FUMARATE DIHYDRATE 2 PUFF: 200; 5 AEROSOL RESPIRATORY (INHALATION) at 07:43

## 2022-07-26 RX ADMIN — RANOLAZINE 500 MG: 500 TABLET, FILM COATED, EXTENDED RELEASE ORAL at 08:39

## 2022-07-26 ASSESSMENT — PAIN SCALES - GENERAL
PAINLEVEL_OUTOF10: 0

## 2022-07-26 NOTE — DISCHARGE SUMMARY
Patient seen and examined. Doing well without chest discomfort, shortness of breath. Ambulating halls without symptoms. Blood pressure 116/74, pulse 85, temperature 98.3 °F (36.8 °C), temperature source Oral, resp. rate 18, height 5' 7\" (1.702 m), weight 203 lb 3.2 oz (92.2 kg), SpO2 94 %. CV-  RRR without murmur, rub, gallop  Lungs- clear bilaterally  Abd- soft, NT, ND  Ext- No edema, right groin clean, dry, no ecchymosis or hematome    Recent Labs     07/26/22  0725      K 3.4*   BUN 10      HGB 14.4   HCT 43.0   WBC 8.9   *     22  Assessment/Plan:  1) CAD- s/p PCI. Importance of DAPT daily stressed    2) Continue aggressive risk factor modification in the outpatient setting. See D/C summary for discharge medications and home therapies. 3) Followup with [facility] in 2 weeks, primary care MD in 4-6 weeks. Lloyd Balbuena MD                    [facility] Discharge Summary     Patient ID:  Amari Young  [id number]  69 y.o.  1970    Admit date: 7/24/2022    Discharge date:  07/26/22     Admitting Physician: Isabelle Daley MD     Discharge Physician: YANI Brandon - PETER/Dr. Reji Brown    Admission Diagnoses: NSTEMI (non-ST elevated myocardial infarction) [facility]) [I21.4]    Discharge Diagnoses:   Patient Active Problem List    Diagnosis    NSTEMI (non-ST elevated myocardial infarction) [facility])    CAD (coronary artery disease)    Hypokalemia    Chest pressure    Dyslipidemia    LUCERO (obstructive sleep apnea)    Obesity (BMI 30-39. 9)    Essential hypertension with goal blood pressure less than 140/90    Type 2 diabetes mellitus without complication [facility])       Cardiology Procedures this admission:  Left heart catheterization with PCI  EchoCardiogram  Consults: none    Hospital Course: Patient with prior h/o CAD s/p PCI Dec 2021 to LAD and ostial posterior lateral branch who presented to [facility] with diffuse abd pain/distention along with nausea. She was admitted to ICU dueafter her initial troponin was elevated. She was started on heparin gtt and [place] [facility] [place]. She did have CT of chest while at [facility] that showed diffuse bilateral pulmonary nodules were described suggesting possible metastatic disease. Her abdominal CT scan showed no acute findings. An echocardiogram showed     Left Ventricle: Normal left ventricular systolic function. EF by 2D Simpsons Biplane is 62%. Left ventricle size is normal. Normal wall thickness. Normal wall motion. Normal diastolic function. Mitral Valve: Mild annular calcification of the mitral valve. Mild regurgitation. Technical qualifiers: Color flow Doppler was performed and pulse wave and/or continuous wave Doppler was performed. Contrast used: Definity. Patient underwent cardiac catheterization by Dr. Lia El. Patient was found to have a 99% stenosis of the mLAD (between 2 previously placed stents) that was stented with a 2.0 x 18 Rubén drug-eluting stent with 0% residual stenosis. Patient tolerated the procedure well and was taken to the telemetry floor for recovery. The morning of 7/26/2022, the patient was up feeling well without any complaints of chest pain or shortness of breath. Patient's right radial cath site was clean, dry and intact without hematoma or bruit. Patient's labs were WNL. Patient was seen and examined by Dr. Lia El and determined stable and ready for discharge. Patient was instructed on the importance of medication compliance including taking aspirin and plavix everyday without missing a dose. After receiving drug eluting stents, the patient will remain on dual anti-platelet therapy for 1 year. For maximized medical therapy for CAD, patient will continue BB, ARB but no statin due to intolerance to all statins. The patient is [facility]  The patient will follow up with [address] Caridad Garcia (TC-[id number]) on 8/2/2022 at 930 am in [facility] office. The patient has been referred to cardiac rehab. Spoke to pulmonary prior to d/c. Pulmonary will see patient outpt to follow up on pulmonary nodules. DISPOSITION: The patient is being discharged home in stable condition on a low saturated fat, low cholesterol and low salt diet. The patient is instructed to advance activities as tolerated to the limit of fatigue or shortness of breath. The patient is instructed to avoid all heavy lifting for 5 days. The patient is instructed to watch the cath site for bleeding/oozing; if seen, the patient is instructed to apply firm pressure with a clean cloth and call [facility] at [phone]. The patient is instructed to watch for signs of infection which include: increasing area of redness, fever/hot to touch or purulent drainage at the catheterization site. The patient is instructed not to soak in a bathtub for 7-10 days, but is cleared to shower. The patient is instructed to call the office or return to the ER for immediate evaluation for any shortness of breath or chest pain not relieved by NTG. Discharge Exam: /65   Pulse 83   Temp 98.6 °F (37 °C)   Resp 20   Ht 5' 7\" (1.702 m)   Wt 203 lb 3.2 oz (92.2 kg)   SpO2 97%   BMI 31.83 kg/m²   Physical Exam  HENT:      Head: Normocephalic. Cardiovascular:      Rate and Rhythm: Normal rate and regular rhythm. Pulses: Normal pulses. Heart sounds: Normal heart sounds. Pulmonary:      Effort: Pulmonary effort is normal.      Breath sounds: Normal breath sounds. Neurological:      Mental Status: She is alert.     Right radial site without any hematoma or bleeding     Recent Results (from the past 24 hour(s))   Transthoracic echocardiogram (TTE) complete with contrast, bubble, strain, and 3D PRN    Collection Time: 07/25/22  9:20 AM   Result Value Ref Range    LV EDV A2C 114 mL    LV EDV A4C 115 mL    LV ESV A2C 42 mL    LV ESV A4C 45 mL    IVSd 0.9 0.6 - 0.9 cm    LVIDd 5.4 (A) 3.9 - 5.3 cm    LVIDs 3.9 cm    LVOT Diameter 2.0 cm    LVOT Mean Gradient 2 mmHg    LVOT VTI 19.4 cm    LVOT Peak Velocity 0.9 m/s    LVOT Peak Gradient 3 mmHg    LVPWd 0.8 0.6 - 0.9 cm    LV E' Lateral Velocity 11 cm/s    LV E' Septal Velocity 8 cm/s    LV Ejection Fraction A2C 63 %    LV Ejection Fraction A4C 61 %    EF BP 62 55 - 100 %    LVOT Area 3.1 cm2    LVOT SV 60.9 ml    LA Minor Axis 5.5 cm    LA Major Axis 5.6 cm    LA Area 2C 16.5 cm2    LA Area 4C 14.8 cm2    LA Volume BP 35 22 - 52 mL    LA Diameter 3.2 cm    AV Mean Velocity 1.2 m/s    AV Mean Gradient 6 mmHg    AV VTI 34.1 cm    AV Peak Velocity 1.8 m/s    AV Peak Gradient 13 mmHg    AV Area by VTI 1.8 cm2    AV Area by Peak Velocity 1.6 cm2    Aortic Root 3.1 cm    Ascending Aorta 3.0 cm    IVC Proxmal 1.7 cm    MV E Wave Deceleration Time 206.0 ms    MV A Velocity 0.97 m/s    MV E Velocity 1.07 m/s    MV Mean Gradient 3 mmHg    MV VTI 31.1 cm    MV Mean Velocity 0.9 m/s    MV Max Velocity 1.1 m/s    MV Peak Gradient 5 mmHg    MV Area by VTI 2.0 cm2    PV AT 69.0 ms    PV Max Velocity 0.9 m/s    PV Peak Gradient 3 mmHg    Est. RA Pressure 3 mmHg    RVIDd 3.0 cm    RV Basal Dimension 3.6 cm    TAPSE 2.1 1.7 cm    Body Surface Area 2.05 m2    Fractional Shortening 2D 28 28 - 44 %    LV ESV Index A4C 23 mL/m2    LV EDV Index A4C 58 mL/m2    LV ESV Index A2C 21 mL/m2    LV EDV Index A2C 57 mL/m2    LVIDd Index 2.70 cm/m2    LVIDs Index 1.95 cm/m2    LV RWT Ratio 0.30     LV Mass 2D 167.4 (A) 67 - 162 g    LV Mass 2D Index 83.7 43 - 95 g/m2    MV E/A 1.10     E/E' Ratio (Averaged) 11.55     E/E' Lateral 9.73     E/E' Septal 13.38     LA Volume Index BP 18 16 - 34 ml/m2    LVOT Stroke Volume Index 30.5 mL/m2    LA Size Index 1.60 cm/m2    LA/AO Root Ratio 1.03     Ao Root Index 1.55 cm/m2    Ascending Aorta Index 1.50 cm/m2    AV Velocity Ratio 0.50     LVOT:AV VTI Index 0.57     ELIER/BSA VTI 0.9 cm2/m2    ELIER/BSA Peak Velocity 0.8 cm2/m2    MV:LVOT VTI Index 1.60    Cardiac procedure    Collection Time: 07/25/22 11:33 AM   Result Value Ref Range    Body Surface Area 2.05 m2   Basic Metabolic Panel    Collection Time: 07/26/22  7:25 AM   Result Value Ref Range    Sodium 139 136 - 145 mmol/L    Potassium 3.4 (L) 3.5 - 5.1 mmol/L    Chloride 107 98 - 107 mmol/L    CO2 30 21 - 32 mmol/L    Anion Gap 2 (L) 7 - 16 mmol/L    Glucose 143 (H) 65 - 100 mg/dL    BUN 10 6 - 23 MG/DL    Creatinine 0.70 0.6 - 1.0 MG/DL    GFR African American >60 >60 ml/min/1.73m2    GFR Non- >60 >60 ml/min/1.73m2    Calcium 8.1 (L) 8.3 - 10.4 MG/DL   CBC    Collection Time: 07/26/22  7:25 AM   Result Value Ref Range    WBC 8.9 4.3 - 11.1 K/uL    RBC 4.74 4.05 - 5.2 M/uL    Hemoglobin 14.4 11.7 - 15.4 g/dL    Hematocrit 43.0 35.8 - 46.3 %    MCV 90.7 79.6 - 97.8 FL    MCH 30.4 26.1 - 32.9 PG    MCHC 33.5 31.4 - 35.0 g/dL    RDW 12.8 11.9 - 14.6 %    Platelets 446 (L) 988 - 450 K/uL    MPV 12.5 (H) 9.4 - 12.3 FL    nRBC 0.00 0.0 - 0.2 K/uL         Patient Instructions:   Current Discharge Medication List        CONTINUE these medications which have NOT CHANGED    Details   DULoxetine (CYMBALTA) 60 MG extended release capsule Take 60 mg by mouth in the morning.       Dulaglutide (TRULICITY) 1.05 DV/3.7AI SOPN Inject 0.75 mg into the skin Q 10 days      aspirin 81 MG EC tablet Take 81 mg by mouth every evening      cetirizine (ZYRTEC) 10 MG tablet Take 10 mg by mouth      chlorthalidone (HYGROTON) 25 MG tablet Take 25 mg by mouth every evening      Cholecalciferol 100 MCG (4000 UT) CAPS Take 8,000 Units by mouth      clopidogrel (PLAVIX) 75 MG tablet Take 75 mg by mouth daily      empagliflozin (JARDIANCE) 25 MG tablet Take 25 mg by mouth daily      fluticasone-vilanterol (BREO ELLIPTA) 100-25 MCG/INH AEPB inhaler 1 inhalation every morning, rinse mouth after use      fluticasone (FLONASE) 50 MCG/ACT nasal spray 2 sprays by Nasal route as needed      guaiFENesin (MUCINEX MAXIMUM STRENGTH) 1200 MG TB12 Take 1,200 mg by mouth as needed      Insulin Glargine, 2 Unit Dial, (TOUJEO MAX SOLOSTAR) 300 UNIT/ML SOPN INJECT 10 15 UNITS UNDER THE SKIN DAILY      levalbuterol (XOPENEX) 0.63 MG/3ML nebulization 1 vial via nebulizer q 6 hours prn wheezing and shortness of breath      levalbuterol (XOPENEX HFA) 45 MCG/ACT inhaler Albuterol inhaler or nebulizer 4 times daily as needed. LORazepam (ATIVAN) 0.5 MG tablet Take 0.5 mg by mouth every 4 hours as needed.       losartan (COZAAR) 50 MG tablet Take 50 mg by mouth daily      metoprolol succinate (TOPROL XL) 50 MG extended release tablet Take 50 mg by mouth daily      niacin 500 MG tablet Take 500 mg by mouth every morning (before breakfast)      nitroGLYCERIN (NITROSTAT) 0.4 MG SL tablet Place 0.4 mg under the tongue      ondansetron (ZOFRAN-ODT) 4 MG disintegrating tablet Take 4 mg by mouth 3 times daily as needed      potassium chloride (KLOR-CON M) 20 MEQ extended release tablet Take 20 mEq by mouth daily      ranolazine (RANEXA) 500 MG extended release tablet Take 500 mg by mouth 2 times daily           STOP taking these medications       Evolocumab (REPATHA SURECLICK) 341 MG/ML SOAJ Comments:   Reason for Stopping: waiting on PA approval                  Signed:  YANI Navarro - CNP-C  7/26/2022  8:58 AM

## 2022-07-26 NOTE — PROGRESS NOTES
Cardiac Rehab: Spoke with patient regarding referral to cardiac rehab. Patient meets admission criteria based on PCI (7/25/22). Written information about Cardiac Rehab given and reviewed with patient. Discussed lifestyle modifications to promote cardiac wellness. Patient indicated that she does not want to repeat the cardiac rehab program at this time. Pt states she has many nutrition related questions and states she will reach out to her PCP for a Nutrition Counseling referral. Her Cardiologist is Dr. Louise Brown.       Thank you,  CAROL LockwoodN, RN  Cardiopulmonary Rehabilitation Nurse Liaison  [facility]

## 2022-07-26 NOTE — TELEPHONE ENCOUNTER
Recent DC [place]. CT chest Pulmonary embolism 7/23/22 CT abdomen pelvis 7/23/22 pushed to PACS. multiple pulmonary nodules    Lm on vm to call for scheduling hospital follow up. Schedule with Yoni Velarde.

## 2022-07-26 NOTE — PLAN OF CARE
Problem: Discharge Planning  Goal: Discharge to home or other facility with appropriate resources  7/26/2022 1037 by Ralph Kaye RN  Outcome: Resolved/Met  7/26/2022 1017 by Satcy Rodas RN  Outcome: Resolved/Met  Flowsheets (Taken 7/26/2022 4034)  Discharge to home or other facility with appropriate resources:   Identify barriers to discharge with patient and caregiver   Arrange for needed discharge resources and transportation as appropriate   Identify discharge learning needs (meds, wound care, etc)     Problem: Pain  Goal: Verbalizes/displays adequate comfort level or baseline comfort level  7/26/2022 1037 by Ralph Kaye RN  Outcome: Resolved/Met  7/26/2022 1017 by Stacy Rodas RN  Outcome: Resolved/Met     Problem: Safety - Adult  Goal: Free from fall injury  7/26/2022 1037 by Ralph Kaye RN  Outcome: Resolved/Met  7/26/2022 1017 by Stacy Rodas RN  Outcome: Resolved/Met  Flowsheets (Taken 7/26/2022 0836)  Free From Fall Injury: Instruct family/caregiver on patient safety     Problem: Chronic Conditions and Co-morbidities  Goal: Patient's chronic conditions and co-morbidity symptoms are monitored and maintained or improved  7/26/2022 1037 by Ralph Kaye RN  Outcome: Resolved/Met  7/26/2022 1017 by Stacy Rodas RN  Outcome: Resolved/Met  Flowsheets (Taken 7/26/2022 0836)  Care Plan - Patient's Chronic Conditions and Co-Morbidity Symptoms are Monitored and Maintained or Improved: Monitor and assess patient's chronic conditions and comorbid symptoms for stability, deterioration, or improvement     Problem: ABCDS Injury Assessment  Goal: Absence of physical injury  7/26/2022 1037 by Ralph Kaye RN  Outcome: Resolved/Met  7/26/2022 1017 by Stacy Rodas RN  Outcome: Resolved/Met  Flowsheets (Taken 7/26/2022 0204)  Absence of Physical Injury: Implement safety measures based on patient assessment

## 2022-07-26 NOTE — PLAN OF CARE
Problem: Discharge Planning  Goal: Discharge to home or other facility with appropriate resources  Outcome: Resolved/Met  Flowsheets (Taken 7/26/2022 0836)  Discharge to home or other facility with appropriate resources:   Identify barriers to discharge with patient and caregiver   Arrange for needed discharge resources and transportation as appropriate   Identify discharge learning needs (meds, wound care, etc)     Problem: Pain  Goal: Verbalizes/displays adequate comfort level or baseline comfort level  Outcome: Resolved/Met     Problem: Safety - Adult  Goal: Free from fall injury  Outcome: Resolved/Met  Flowsheets (Taken 7/26/2022 0836)  Free From Fall Injury: Instruct family/caregiver on patient safety     Problem: Chronic Conditions and Co-morbidities  Goal: Patient's chronic conditions and co-morbidity symptoms are monitored and maintained or improved  Outcome: Resolved/Met  Flowsheets (Taken 7/26/2022 0836)  Care Plan - Patient's Chronic Conditions and Co-Morbidity Symptoms are Monitored and Maintained or Improved: Monitor and assess patient's chronic conditions and comorbid symptoms for stability, deterioration, or improvement     Problem: ABCDS Injury Assessment  Goal: Absence of physical injury  Outcome: Resolved/Met  Flowsheets (Taken 7/26/2022 0836)  Absence of Physical Injury: Implement safety measures based on patient assessment

## 2022-07-26 NOTE — CARE COORDINATION
Pt underwent cardiac catheterization by Dr. Tiffanie Mayen. Pt was found to have a 99% stenosis of the mLAD (between 2 previously placed stents) that was stented. Pt tolerated the procedure well and is now discharging home in stable condition. No discharge needs were identified. Tx goals have been met.     07/26/22 1058   Discharge Planning   Living Arrangements Spouse/Significant Other   Current Services Prior To Admission None   Potential Assistance Needed N/A   DME Ordered? No   Potential Assistance Purchasing Medications No   Type of Home Care Services None   Patient expects to be discharged to: [address] Discharge   Services At/After Discharge None   [employer] Information Provided? No   Mode of Transport at Discharge Other (see comment)  (Family)   Confirm Follow Up Transport Family   Condition of Participation: Discharge Planning   The Patient and/or Patient Representative was provided with a Choice of Provider? Patient   The Patient and/Or Patient Representative agree with the Discharge Plan? Yes   Freedom of Choice list was provided with basic dialogue that supports the patient's individualized plan of care/goals, treatment preferences, and shares the quality data associated with the providers?   Yes

## 2022-07-26 NOTE — PROGRESS NOTES
Discharge instructions reviewed with patient. Prescriptions given for Clovia Shallow and med info sheets provided for all new medications. Opportunity for questions provided. Patient voiced understanding of all discharge instructions.

## 2022-07-26 NOTE — TELEPHONE ENCOUNTER
Khushbu Mean from [address] called said that patient had called them and told them that her prescription   Was one dose short . Khushbu Mean ask her if she may have spilled it are if she may have taken it by accident and she replied no. So they gave  her 1 dose to cover the missing dose . He was letting us know that she still will be short when it is refilled August 17th . And he was letting us know so it can be fixed on her next appt .

## 2022-07-27 ENCOUNTER — TELEPHONE (OUTPATIENT)
Dept: CARDIOLOGY CLINIC | Age: 52
End: 2022-07-27

## 2022-07-27 NOTE — TELEPHONE ENCOUNTER
Transitions of Care  Dx: NSTEMI  Discharge date: 7/26/22  Follow up appt: 8/2/22 in [facility]  How patient is feeling: cp? Sob?     Cath Site: Right radial-     Med changes:none except awaiting PA for Repatha

## 2022-07-27 NOTE — TELEPHONE ENCOUNTER
Transitions of Care  Dx: NSTEMI  Discharge date: 7/26/22  Follow up appt: 8/2/22 in [facility]  How patient is feeling: cp? Sob? Cath Site: Right radial-    I spoke w/pt. she said she is feeling tired and having some SOB. She has no way to check BP at home. Cath site looks ok. I went over DC summary including diet,activity,post op care,s/s of infection importance of med compliance. S/S of infection. BP is normal she thinks she caught a cold and not feeling all that great,She was tested for COVID and flu in hospital.She has all her meds. I went over fu appt. 8/2 w/ /JOSE All questions answered and pt.advised to call PRN.

## 2022-07-28 RX ORDER — EVOLOCUMAB 140 MG/ML
140 INJECTION, SOLUTION SUBCUTANEOUS
Qty: 2 PEN | Refills: 11 | Status: SHIPPED | OUTPATIENT
Start: 2022-07-28

## 2022-07-28 NOTE — TELEPHONE ENCOUNTER
Left message informing patient [address] has been approved. [id number]; Review Type:Prior Auth; Coverage Start Date:06/28/2022; Coverage End Date:07/28/2023; Rx sent to [address] [place] [address] for a signature.

## 2022-07-28 NOTE — TELEPHONE ENCOUNTER
----- Message from Felix Hillman MD sent at 7/22/2022  2:48 PM EDT -----  Ms. Ganesh Amaya is again denied her Repatha.   We need to fill out appropriate prior authorization paperwork again and send into the pharmacy along with a new prescription for the [address] [place] [address]

## 2022-08-01 NOTE — TELEPHONE ENCOUNTER
Requested Prescriptions     Signed Prescriptions Disp Refills    Evolocumab (REPATHA SURECLICK) 310 MG/ML SOAJ 2 pen 11     Sig: Inject 140 mg into the skin every 14 days     Authorizing Provider: Main Aguero

## 2022-08-02 ENCOUNTER — OFFICE VISIT (OUTPATIENT)
Dept: CARDIOLOGY CLINIC | Age: 52
End: 2022-08-02
Payer: COMMERCIAL

## 2022-08-02 VITALS
HEIGHT: 67 IN | BODY MASS INDEX: 30.13 KG/M2 | DIASTOLIC BLOOD PRESSURE: 83 MMHG | WEIGHT: 192 LBS | SYSTOLIC BLOOD PRESSURE: 122 MMHG | HEART RATE: 86 BPM

## 2022-08-02 DIAGNOSIS — E11.9 TYPE 2 DIABETES MELLITUS WITHOUT COMPLICATION, WITHOUT LONG-TERM CURRENT USE OF INSULIN (HCC): ICD-10-CM

## 2022-08-02 DIAGNOSIS — I25.10 CORONARY ARTERY DISEASE INVOLVING NATIVE CORONARY ARTERY OF NATIVE HEART WITHOUT ANGINA PECTORIS: Primary | ICD-10-CM

## 2022-08-02 DIAGNOSIS — G47.33 OSA (OBSTRUCTIVE SLEEP APNEA): ICD-10-CM

## 2022-08-02 DIAGNOSIS — I10 ESSENTIAL HYPERTENSION WITH GOAL BLOOD PRESSURE LESS THAN 140/90: ICD-10-CM

## 2022-08-02 DIAGNOSIS — E78.5 DYSLIPIDEMIA: ICD-10-CM

## 2022-08-02 PROCEDURE — 99214 OFFICE O/P EST MOD 30 MIN: CPT | Performed by: INTERNAL MEDICINE

## 2022-08-02 PROCEDURE — 3052F HG A1C>EQUAL 8.0%<EQUAL 9.0%: CPT | Performed by: INTERNAL MEDICINE

## 2022-08-02 RX ORDER — COLCHICINE 0.6 MG/1
0.6 TABLET ORAL DAILY
Qty: 30 TABLET | Refills: 3 | Status: SHIPPED | OUTPATIENT
Start: 2022-08-02
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
## 2022-08-10 ENCOUNTER — OFFICE VISIT (OUTPATIENT)
Dept: PULMONOLOGY | Age: 52
End: 2022-08-10
Payer: COMMERCIAL

## 2022-08-10 VITALS
HEART RATE: 100 BPM | TEMPERATURE: 98 F | RESPIRATION RATE: 20 BRPM | SYSTOLIC BLOOD PRESSURE: 120 MMHG | WEIGHT: 191 LBS | BODY MASS INDEX: 29.98 KG/M2 | DIASTOLIC BLOOD PRESSURE: 80 MMHG | HEIGHT: 67 IN | OXYGEN SATURATION: 100 %

## 2022-08-10 DIAGNOSIS — L03.031 CELLULITIS OF TOE OF RIGHT FOOT: ICD-10-CM

## 2022-08-10 DIAGNOSIS — Z12.11 ENCOUNTER FOR SCREENING COLONOSCOPY: ICD-10-CM

## 2022-08-10 DIAGNOSIS — R91.1 PULMONARY NODULE: Primary | ICD-10-CM

## 2022-08-10 PROCEDURE — 99214 OFFICE O/P EST MOD 30 MIN: CPT | Performed by: INTERNAL MEDICINE

## 2022-08-10 RX ORDER — LEVOFLOXACIN 750 MG/1
750 TABLET ORAL DAILY
Qty: 7 TABLET | Refills: 0 | Status: SHIPPED | OUTPATIENT
Start: 2022-08-10 | End: 2022-08-17

## 2022-08-10 RX ORDER — COLCHICINE 0.6 MG/1
0.6 TABLET ORAL DAILY
COMMUNITY
End: 2022-09-15 | Stop reason: CLARIF

## 2022-08-10 NOTE — PROGRESS NOTES
[address] Audrey Pretty. [address], [address] [facility]  [phone]    Patient Name:  Margie Cleaning      YOB: 1970  Office Visit 8/10/2022    ASSESSMENT AND PLAN:  (Medical Decision Making)    Pt with history of asthma, clinically stable. But recent NSTEMI needing PCI. Incidentally found to have B pulmonary nodules somewhat suggestive of mets. No history of cancer. Has psoriatic arthritis which may given a reason for pulmonary nodules. -refer to GI to get recommended colonoscopy. -PET scan and MRI brain given r pulmonary nodules and need  -abx for red toe. Levaquin 750.   -needs to follow up with PCP regarding toe infection and vertigo. F/u in 2-3 months. Diagnoses and all orders for this visit:  Pulmonary nodule  -     PET CT SKULL BASE TO MID THIGH; Future  -     MRI BRAIN W WO CONTRAST; Future  Encounter for screening colonoscopy  -     Ambulatory referral to Gastroenterology  Other orders  -     levoFLOXacin (LEVAQUIN) 750 MG tablet; Take 1 tablet by mouth in the morning for 7 days. Reggie Cano MD    Clinical time for encounter was 25 minutes. _________________________________________________________________________    HISTORY OF PRESENT ILLNESS:    Ms. Asia Dominguez in our clinic today who [place] [facility]  . She has a hsitory of never smoking, asthma, LUCERO on APAP. She was last seen in our office in January 2022. At that time she was started on Breo 100 as well as xopenex (albuterol made her overly nervous) inhaler and nebulizer. She was admitted 7/24-26 by cardiology with NSTEMI, LHC showing CAD requiring PCI. During that time a CT chest at [facility] showed diffuse B pulmonary nodules worrisome for mets. CT abd showed no acute findings. She states that since getting home she has had issues with vertigo. Feels dizzy, head feels foggy.    She also reports several years of memory loss since hysterectomy in 2014 due to fibroids. She states that she had cardiac arrest at that time. Asthma symptoms are relatively stable. She has had a cough for years. She has a history of psoriatic arthritis. REVIEW OF SYSTEMS:  10 point review of systems is negative except as reported in HPI. PHYSICAL EXAM: Body mass index is 29.91 kg/m². Vitals:    08/10/22 1534   BP: 120/80   Pulse: 100   Resp: 20   Temp: 98 °F (36.7 °C)   TempSrc: Temporal   SpO2: 100%   Weight: 191 lb (86.6 kg)   Height: 5' 7\" (1.702 m)     PERTINENT FINDINGS:   CTA B, no w/r/r  RRR, no m/r/g  R second toe with erythema. DIAGNOSTIC TESTS:                                                                                                             LABS:   Lab Results   Component Value Date/Time    WBC 8.9 07/26/2022 07:25 AM    HGB 14.4 07/26/2022 07:25 AM    HCT 43.0 07/26/2022 07:25 AM     07/26/2022 07:25 AM     No results found for: ALEXX, ANCA, RF, ESR, CRP    Imaging:  CXR:   XR CHEST (2 VW) 01/31/2022    Narrative  EXAM: CHEST X-RAY, 2 VIEWS    INDICATION: Cough, asthma    COMPARISON: Chest x-ray 5/19/2021    TECHNIQUE: Frontal and lateral views of the chest were obtained. FINDINGS: The lungs are clear and the pulmonary vasculature is normal. No  pneumothorax or pleural effusion. The cardiomediastinal silhouette is within  normal limits. Multilevel degenerative changes of the spine. Impression  No radiographic evidence of acute cardiopulmonary disease. CT WITHOUT CONTRAST: No results found for this or any previous visit from the past 365 days. CT WITH CONTRAST: No results found for this or any previous visit from the past 365 days. CT HIGH RES: No results found for this or any previous visit from the past 365 days. CT PE PROTOCOL: No results found for this or any previous visit from the past 365 days. LDCT SCREENING: No results found for this or any previous visit from the past 365 days.     PET SCAN: No results found for this or any previous visit from the past 365 days. Outside CT scan from [address] [place] [address] reviewed with patient. PFTs:   No flowsheet data found. No results found for this or any previous visit. Exercise Oximetry: No results found for this or any previous visit. Echo: 07/24/22    TRANSTHORACIC ECHOCARDIOGRAM (TTE) COMPLETE (CONTRAST/BUBBLE/3D PRN) 07/25/2022 12:27 PM (Final)    Interpretation Summary  Formatting of this result is different from the original.      Left Ventricle: Normal left ventricular systolic function. EF by 2D Simpsons Biplane is 62%. Left ventricle size is normal. Normal wall thickness. Normal wall motion. Normal diastolic function. Mitral Valve: Mild annular calcification of the mitral valve. Mild regurgitation. Technical qualifiers: Color flow Doppler was performed and pulse wave and/or continuous wave Doppler was performed. Contrast used: Definity. Signed by: Julita August MD on 7/25/2022 12:27 PM    PM Reference Info:                                                                                                                    Past Medical History:   Diagnosis Date    Asthma     seasonal    CAD (coronary artery disease)     Depression with anxiety     Diabetes ([address]     checks QD, last A1c 6.5, hyposymptoms at 80 , normal 100    Difficult intubation     during hysterectomy pt states small throat , request glidescope    GERD (gastroesophageal reflux disease)     Hypertension     Nausea & vomiting     Obesity (BMI 30-39.9) 34    Panic attacks     Prediabetes     bs: 140's. diet controlled    Psychiatric disorder     ANXIETY    Thromboembolus ([address]     2014- ? ? Pulm Embolism after hysterectomy    Unspecified adverse effect of anesthesia     \"my heart stopped and quit breathing after hysterectomy on the way to PACU 11/7/14    Unspecified sleep apnea     uses cpap.        Tobacco Use: Low Risk     Smoking Tobacco Use: Never    Smokeless Tobacco Use: Never Allergies   Allergen Reactions    Rosuvastatin Other (See Comments)    Tramadol Swelling    Butorphanol Palpitations    Cephalexin Rash and Swelling    Penicillins Rash    Ticagrelor Nausea And Vomiting     Current Outpatient Medications   Medication Instructions    aspirin 81 mg, Oral, EVERY EVENING    cetirizine (ZYRTEC) 10 mg, Oral    chlorthalidone (HYGROTON) 25 mg, Oral, EVERY EVENING    Cholecalciferol 8,000 Units, Oral    clopidogrel (PLAVIX) 75 mg, Oral, DAILY    colchicine (COLCRYS) 0.6 mg, Oral, DAILY    colchicine (COLCRYS) 0.6 mg, Oral, DAILY    DULoxetine (CYMBALTA) 60 mg, Oral, DAILY    empagliflozin (JARDIANCE) 25 mg, Oral, DAILY    fluticasone (FLONASE) 50 MCG/ACT nasal spray 2 sprays, Nasal, PRN    fluticasone-vilanterol (BREO ELLIPTA) 100-25 MCG/INH AEPB inhaler 1 inhalation every morning, rinse mouth after use    Insulin Glargine, 2 Unit Dial, (TOUJEO MAX SOLOSTAR) 300 UNIT/ML SOPN INJECT 10 15 UNITS UNDER THE SKIN DAILY    levalbuterol (XOPENEX HFA) 45 MCG/ACT inhaler Albuterol inhaler or nebulizer 4 times daily as needed.     levalbuterol (XOPENEX) 0.63 MG/3ML nebulization 1 vial via nebulizer q 6 hours prn wheezing and shortness of breath    levoFLOXacin (LEVAQUIN) 750 mg, Oral, DAILY    LORazepam (ATIVAN) 0.5 mg, Oral, EVERY 4 HOURS PRN    losartan (COZAAR) 50 mg, Oral, DAILY    metoprolol succinate (TOPROL XL) 50 mg, Oral, DAILY    Mucinex Maximum Strength 1,200 mg, Oral, PRN    niacin 500 mg, Oral, DAILY BEFORE BREAKFAST    nitroGLYCERIN (NITROSTAT) 0.4 mg, SubLINGual    ondansetron (ZOFRAN-ODT) 4 mg, Oral, 3 TIMES DAILY PRN    potassium chloride (KLOR-CON M) 20 MEQ extended release tablet 20 mEq, Oral, DAILY    ranolazine (RANEXA) 500 mg, Oral, 2 TIMES DAILY    Repatha SureClick 797 mg, SubCUTAneous, EVERY 14 DAYS    Trulicity 0.87 mg, SubCUTAneous, Q 10 days

## 2022-08-15 ENCOUNTER — HOSPITAL ENCOUNTER (OUTPATIENT)
Dept: PET IMAGING | Age: 52
Discharge: HOME | End: 2022-08-18
Payer: COMMERCIAL

## 2022-08-15 DIAGNOSIS — R91.1 PULMONARY NODULE: ICD-10-CM

## 2022-08-15 LAB
GLUCOSE BLD STRIP.AUTO-MCNC: 141 MG/DL (ref 65–100)
SERVICE CMNT-IMP: ABNORMAL

## 2022-08-15 PROCEDURE — 2580000003 HC RX 258: Performed by: INTERNAL MEDICINE

## 2022-08-15 PROCEDURE — 6360000004 HC RX CONTRAST MEDICATION: Performed by: INTERNAL MEDICINE

## 2022-08-15 PROCEDURE — A9552 F18 FDG: HCPCS | Performed by: INTERNAL MEDICINE

## 2022-08-15 PROCEDURE — 82962 GLUCOSE BLOOD TEST: CPT

## 2022-08-15 PROCEDURE — 78815 PET IMAGE W/CT SKULL-THIGH: CPT

## 2022-08-15 PROCEDURE — 3430000000 HC RX DIAGNOSTIC RADIOPHARMACEUTICAL: Performed by: INTERNAL MEDICINE

## 2022-08-15 RX ORDER — FLUDEOXYGLUCOSE F 18 200 MCI/ML
12.17 INJECTION, SOLUTION INTRAVENOUS
Status: COMPLETED | OUTPATIENT
Start: 2022-08-15 | End: 2022-08-15

## 2022-08-15 RX ORDER — SODIUM CHLORIDE 0.9 % (FLUSH) 0.9 %
10 SYRINGE (ML) INJECTION ONCE AS NEEDED
Status: COMPLETED | OUTPATIENT
Start: 2022-08-15 | End: 2022-08-15

## 2022-08-15 RX ADMIN — DIATRIZOATE MEGLUMINE AND DIATRIZOATE SODIUM 10 ML: 660; 100 LIQUID ORAL; RECTAL at 14:05

## 2022-08-15 RX ADMIN — FLUDEOXYGLUCOSE F 18 12.17 MILLICURIE: 200 INJECTION, SOLUTION INTRAVENOUS at 14:05

## 2022-08-15 RX ADMIN — SODIUM CHLORIDE, PRESERVATIVE FREE 10 ML: 5 INJECTION INTRAVENOUS at 14:05

## 2022-08-16 ENCOUNTER — HOSPITAL ENCOUNTER (OUTPATIENT)
Dept: MRI IMAGING | Age: 52
Discharge: HOME | End: 2022-08-19
Payer: COMMERCIAL

## 2022-08-16 DIAGNOSIS — R91.1 PULMONARY NODULE: ICD-10-CM

## 2022-08-16 PROCEDURE — 70553 MRI BRAIN STEM W/O & W/DYE: CPT

## 2022-08-16 PROCEDURE — A9579 GAD-BASE MR CONTRAST NOS,1ML: HCPCS | Performed by: INTERNAL MEDICINE

## 2022-08-16 PROCEDURE — 2580000003 HC RX 258: Performed by: INTERNAL MEDICINE

## 2022-08-16 PROCEDURE — 6360000004 HC RX CONTRAST MEDICATION: Performed by: INTERNAL MEDICINE

## 2022-08-16 RX ORDER — SODIUM CHLORIDE 0.9 % (FLUSH) 0.9 %
10 SYRINGE (ML) INJECTION AS NEEDED
Status: DISCONTINUED | OUTPATIENT
Start: 2022-08-16 | End: 2022-08-20 | Stop reason: HOSPADM

## 2022-08-16 RX ADMIN — SODIUM CHLORIDE, PRESERVATIVE FREE 10 ML: 5 INJECTION INTRAVENOUS at 19:17

## 2022-08-16 RX ADMIN — GADOTERIDOL 18 ML: 279.3 INJECTION, SOLUTION INTRAVENOUS at 19:17

## 2022-08-19 ENCOUNTER — TELEPHONE (OUTPATIENT)
Dept: PULMONOLOGY | Age: 52
End: 2022-08-19

## 2022-08-19 ENCOUNTER — TELEPHONE (OUTPATIENT)
Dept: CARDIOLOGY CLINIC | Age: 52
End: 2022-08-19

## 2022-08-19 DIAGNOSIS — R91.1 PULMONARY NODULE: Primary | ICD-10-CM

## 2022-08-19 NOTE — TELEPHONE ENCOUNTER
Spoke with the patient in regards of their MRI and PET scan results, explained per Dr. Marilynn Sawant that the MRI was normal and that the PET scan did not show any abnormal activity in her pulmonary nodules but that we will repeat another CT scan in 3 months to monitor them. Patient understood the results and did not have any further questions or concerns at this time.   CT scan has been established to be completed in 3 months. // Juanpablo Payne

## 2022-08-19 NOTE — TELEPHONE ENCOUNTER
----- Message from Pleasant Boxer, MD sent at 8/18/2022  9:13 AM EDT -----  Can you let the patient know that fortunately her MRI brain was normal and her PET scan did not show any abnormal activity in her pulmonary nodules which is reassuring. We do need to keep watching them to make sure they stay stable, can we set her up for a repeat CT in 6 monhts. Thanks.    Pleasant Boxer, MD

## 2022-08-23 ENCOUNTER — TELEPHONE (OUTPATIENT)
Dept: CARDIOLOGY CLINIC | Age: 52
End: 2022-08-23

## 2022-08-23 NOTE — TELEPHONE ENCOUNTER
Received short term disability paperwork for pt from [employer]. She had a NSTEMI in July of this year. I called to ask her if she had returned to work or if Dr Maida Giron had told her when to go back and she stated that he told her that he was putting her out of work indefinitely. She even asked about how to apply for SS disability. I do not see anything in the chart that states that she should be out indefinitely.

## 2022-09-12 ENCOUNTER — HOSPITAL ENCOUNTER (OUTPATIENT)
Dept: CARDIAC REHAB | Age: 52
Setting detail: RECURRING SERIES
Discharge: HOME | End: 2022-09-15
Payer: COMMERCIAL

## 2022-09-12 ASSESSMENT — PATIENT HEALTH QUESTIONNAIRE - PHQ9
5. POOR APPETITE OR OVEREATING: 2
7. TROUBLE CONCENTRATING ON THINGS, SUCH AS READING THE NEWSPAPER OR WATCHING TELEVISION: 2
9. THOUGHTS THAT YOU WOULD BE BETTER OFF DEAD, OR OF HURTING YOURSELF: 0
2. FEELING DOWN, DEPRESSED OR HOPELESS: 2
3. TROUBLE FALLING OR STAYING ASLEEP: 3
4. FEELING TIRED OR HAVING LITTLE ENERGY: 2
1. LITTLE INTEREST OR PLEASURE IN DOING THINGS: 2
10. IF YOU CHECKED OFF ANY PROBLEMS, HOW DIFFICULT HAVE THESE PROBLEMS MADE IT FOR YOU TO DO YOUR WORK, TAKE CARE OF THINGS AT HOME, OR GET ALONG WITH OTHER PEOPLE: 1
8. MOVING OR SPEAKING SO SLOWLY THAT OTHER PEOPLE COULD HAVE NOTICED. OR THE OPPOSITE, BEING SO FIGETY OR RESTLESS THAT YOU HAVE BEEN MOVING AROUND A LOT MORE THAN USUAL: 2
6. FEELING BAD ABOUT YOURSELF - OR THAT YOU ARE A FAILURE OR HAVE LET YOURSELF OR YOUR FAMILY DOWN: 2
SUM OF ALL RESPONSES TO PHQ QUESTIONS 1-9: 17
SUM OF ALL RESPONSES TO PHQ9 QUESTIONS 1 & 2: 4
SUM OF ALL RESPONSES TO PHQ QUESTIONS 1-9: 17

## 2022-09-12 ASSESSMENT — EJECTION FRACTION: EF_VALUE: 62

## 2022-09-12 ASSESSMENT — LIFESTYLE VARIABLES: SMOKELESS_TOBACCO: NO

## 2022-09-12 NOTE — PROGRESS NOTES
Dear Dr. Maida Giron,    Thank you for referring your patient,Mrs. Kobe Ivey ( 1970), to the Cardiopulmonary Rehabilitation Program at [address] [place] [address]. She is a good candidate for the Cardiac Rehab Program and should see improvements with regular participation. We will be addressing appropriate interventions for modifiable risk factors with your patient during the next 12 weeks. We will contact you with any issues or concerns that may arise, or you can follow your patient's progress through [address] [place] [address] at any time. A final summary will be sent to you when the program is completed. Again, thank you for the referral. If we can be of further assistance, please feel free to contact the Cardiopulmonary Rehab staff at [phone].     Sincerely,    ANANTH Resendez, RN  Cardiopulmonary Rehabilitation Nurse Liaison  [facility]
evidenced by pt request for RD education and counseling. NUTRITION INTERVENTION  Recommendations:  Consistent CHO, macronutrient balanced meal pattern   Minimum 28-30g of fiber daily   Choose fiber-rich whole foods over highly processed foods  Limit added sugarsm refined grains, and red meat intake  Replace saturated fat with unsaturated fat   Limit sodium to 2300mg/day (~600-700mg per meal)   Increase nonstarchy vegetable intake from baseline     Cardioprotective Nutrition Education and Counseling   Reviewed MNT for CVD  Discussed effects of foods/beverages on metabolic parameters (blood pressure, glucose, lipids, and weight)   Encouraged structured eating: planning ahead for macronutrient balanced meals and snacks, consistent CHO intake, not skipping meals, and designated times for meals/snacks. Encouraged consuming complex carbohydrate sources, lean protein sources, and heart healthy fats, and limiting treat foods to 2x a week or less. Reviewed food sources of CHO, lean protein, and heart healthy fat . Utilized MI, self monitoring and goal setting in behavior change counseling   Discussed recommendations in context of Healthy Eating Plate (1/4 plate protein, 1/4 plate complex CHO, 1/2 plate nonstarchy veg)    Handouts provided for home use:   [employer] Healthy Eating Plate, Anti-Inflammatory diet, Potassium-containing foods     Nutrition Goals: To better fuel my body to lose weight and get healthier, decrease inflammation. Use handouts to get organized and start implementing these principles    NUTRITION MONITORING/EVALUATION  RD to follow up with participant during rehab sessions for questions and assessment of progression toward goals. Anticipate Good  compliance with recommendations. Pt verbalizes understanding to recommendations discussed.      Chavo Gunderson, [address] [place] [address],   Cardiopulmonary Rehab Dietitian
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
## 2022-09-13 ENCOUNTER — TELEPHONE (OUTPATIENT)
Dept: DIABETES SERVICES | Age: 52
End: 2022-09-13

## 2022-09-13 NOTE — TELEPHONE ENCOUNTER
Received request from patient for DME. She is being seen for Cardiac Rehab. Will request referral from Rosa Vasquez NP. [phone]. Will send referral form via fax.

## 2022-09-14 ENCOUNTER — HOSPITAL ENCOUNTER (OUTPATIENT)
Dept: GENERAL RADIOLOGY | Age: 52
Discharge: HOME | End: 2022-09-17
Payer: COMMERCIAL

## 2022-09-14 DIAGNOSIS — L40.50 PSORIATIC ARTHRITIS (HCC): ICD-10-CM

## 2022-09-14 PROCEDURE — 73630 X-RAY EXAM OF FOOT: CPT

## 2022-09-14 PROCEDURE — 73120 X-RAY EXAM OF HAND: CPT

## 2022-09-15 ENCOUNTER — OFFICE VISIT (OUTPATIENT)
Dept: SLEEP MEDICINE | Age: 52
End: 2022-09-15
Payer: COMMERCIAL

## 2022-09-15 ENCOUNTER — HOSPITAL ENCOUNTER (OUTPATIENT)
Dept: CARDIAC REHAB | Age: 52
Setting detail: RECURRING SERIES
End: 2022-09-15
Payer: COMMERCIAL

## 2022-09-15 VITALS
SYSTOLIC BLOOD PRESSURE: 110 MMHG | HEIGHT: 67 IN | RESPIRATION RATE: 18 BRPM | DIASTOLIC BLOOD PRESSURE: 80 MMHG | TEMPERATURE: 97.3 F | WEIGHT: 192.2 LBS | HEART RATE: 81 BPM | OXYGEN SATURATION: 96 % | BODY MASS INDEX: 30.17 KG/M2

## 2022-09-15 DIAGNOSIS — D75.1 POLYCYTHEMIA: ICD-10-CM

## 2022-09-15 DIAGNOSIS — G47.33 OSA (OBSTRUCTIVE SLEEP APNEA): Primary | ICD-10-CM

## 2022-09-15 DIAGNOSIS — G47.00 INSOMNIA, UNSPECIFIED TYPE: ICD-10-CM

## 2022-09-15 DIAGNOSIS — H93.13 TINNITUS, BILATERAL: ICD-10-CM

## 2022-09-15 PROCEDURE — 99214 OFFICE O/P EST MOD 30 MIN: CPT | Performed by: INTERNAL MEDICINE

## 2022-09-15 ASSESSMENT — SLEEP AND FATIGUE QUESTIONNAIRES
HOW LIKELY ARE YOU TO NOD OFF OR FALL ASLEEP WHILE SITTING QUIETLY AFTER LUNCH WITHOUT ALCOHOL: 0
HOW LIKELY ARE YOU TO NOD OFF OR FALL ASLEEP WHILE SITTING AND READING: 2
HOW LIKELY ARE YOU TO NOD OFF OR FALL ASLEEP WHILE SITTING INACTIVE IN A PUBLIC PLACE: 0
HOW LIKELY ARE YOU TO NOD OFF OR FALL ASLEEP WHEN YOU ARE A PASSENGER IN A CAR FOR AN HOUR WITHOUT A BREAK: 2
HOW LIKELY ARE YOU TO NOD OFF OR FALL ASLEEP WHILE WATCHING TV: 2
HOW LIKELY ARE YOU TO NOD OFF OR FALL ASLEEP WHILE LYING DOWN TO REST IN THE AFTERNOON WHEN CIRCUMSTANCES PERMIT: 2
HOW LIKELY ARE YOU TO NOD OFF OR FALL ASLEEP IN A CAR, WHILE STOPPED FOR A FEW MINUTES IN TRAFFIC: 0
ESS TOTAL SCORE: 8
HOW LIKELY ARE YOU TO NOD OFF OR FALL ASLEEP WHILE SITTING AND TALKING TO SOMEONE: 0

## 2022-09-15 NOTE — PATIENT INSTRUCTIONS
Sleep Hygiene Instructions    Sleep only as much as you need to feel refreshed during the following day. Restricting your time in bed helps to consolidate and deepen your sleep. Excessively long times in bed lead to fragmented and shallow sleep. Get up at your regular time the next day, no matter how little your slept. Get up at the same time each day, 7 days a week. A regular wake time in the morning leads to regular times on sleep onset, and helps to set your biological clock. Exercise regularly. Schedule exercise times so that they do not occur within 3 hours of when you intend to go to bed. Exercise makes it easier to initiate sleep and deepen sleep. Don't take your problems to bed. Plan some time earlier in the evening for working on your problems or planning the next day's activities. Worrying may interfere with initiating sleep and produce shallow sleep. Train yourself to use the bedroom only for sleep and sexual activity. This will help condition your brain to see bed as the place for sleeping. Do not read, watch TV or eat in bed. Do not try and fall asleep. This only makes the problem worse. Instead, turn on the light, leave the bedroom, and do something different like reading a book. Don't engage in stimulating activity. Return to bed only when you feel sleepy. Avoid long naps. Staying awake during the day helps to fall asleep at night. Naps totalling more than 30 minutes increase your chances of having trouble sleeping at night. Make sure that your bedroom is comfortable and free from light and noise. A comfortable, noise-free sleep environment will reduce the likelihood that you will wake up during the night. Noise that does not awaken you may disturb the quality of your sleep. Carpeting, insulated curtains, and closing the door may help. Make sure that your bedroom is at a comfortable temperature during the night.  Excessively warm or cold sleep environments may disturb sleep. Eat regular meals and di not go to bed hungry. Hunger may disturb sleep. A light snack at bedtime (especially carbohydrates) may help sleep, but avoid greasy or heavy foods. Avoid excessive liquids in the evening. Reducing liquid intake will minimize the need for night-time trips to the bathroom. Cut down on all caffeine products. Caffeinated beverages and food (Coffee, tea, cola, chocolate) can cause difficulty falling asleep, awakenings during the night, and shallow sleep. Even caffeine early in the day can disrupt night-time sleep. Avoid alcohol, especially in the evening. Although alcohol helps tense people fall asleep more easily, it causes awakenings later in the night. Smoking may disturb sleep. Nicotine is a stimulant. Try not to smoke during the night when you have trouble sleeping. Learning about Sleeping Well    What does sleeping well mean? Sleeping well means getting enough sleep. How much sleep is enough varies among people. The number of hours you sleep is not as improtant as how you feel when you wake up. If you do not feel refreshed, you probably need more sleep. Another sign of not getting enough sleep is feeling tired during the day. The average totally nightly sleep time is 7 1/2 to 8 hours. Healthy adults may need a little more or a little less than this. Why is getting enough sleep important? Getting enough quality sleep is a basic part of good health. When your sleep suffers, your mood and your thoughts can suffer too. Your thoughts can suffer too. You ma find yourself feeling more grumpy or stressed. No getting enough sleep also can lead to serious problems, including injury, accidents, anxiety, and depression. What might cause poor sleeping? Many things can cause sleep problems, including:    Stress. Stress can be caused by fear about a single event, such as giving a speech.   Or you may have ongoing stress, such as worry about work or school. Depression, anxiety, and other mental or emotional conditions. Changes in your sleep habits or surroundings. This includes changes that happen where you sleep, such as noise, light, or sleeping in a different bed. It also includes changes in your sleep pattern, such as having jet lab or working a late shift. Health problems, such as pain, breathing problems, and restless legs syndrome. Lack of regular exercise. How can you help yourself? Here are some tips that may help you sleep more soundly and wake up feeling more refreshed. Your sleeping area    Use your bedroom only for sleeping and sex. A bit of light reading may help you fall asleep. But if it doesn't, do your reading elsewhere in the house. Don't watch TV in bed. Be sure your bed is big enough to stretch out comfortable, especially if you have a sleep partner. Keep your bedroom quiet, dark, and cool. Use curtains, blinds, or sleep mask to block out light. To block out noise, use earplugs, soothing music, or a \"white noise\" machine. Your evening and bedtime routine    Create a relaxing bedtime routine. You might want to take a warm shower or bath, listen to soothing music, or drink a cup of non-caffeinated tea. Go to bed at the same time every night. And get up at the same time every morning, even if you feel tired. What to avoid:    Limit caffeine (coffee, tea, caffeinated sodas) during the day, and dont have any for at least 4 to 6 hours before bedtime. Don't drink alcohol before bedtime. Alcohol can cause you to wake up more often during the night. Don't smoke or use tobacco, especially in the evening. Nicotine can keep you awake. Don't like in bed away for too long. If you can't fall asleep, or if you wake up in the middle of the night and can't get back to sleep within 15 minutes or so, get out of bed and go to another room until you feel sleepy.     Don't take medicine right before bed that may keep you awake or make you feel hyper or enegerized. Your doctor can tell you if your medicine may do this and if you can take it earlier in the day. If you can't sleep:    Imagine yourself in a peaceful, pleasant scene. Focus on the details and feelings of being in a place that is relaxing. Get up and do a quiet or boring activity until you feel sleepy. Don't drink liquids after 6 p.m. if you wake up often because you have to go to the bathroom. Where can you learn more? Go to http://www.gray.com/    Enter C462 in the search box to learn more about \"Learning About Sleeping Well. \"

## 2022-09-16 ENCOUNTER — TELEPHONE (OUTPATIENT)
Dept: SLEEP MEDICINE | Age: 52
End: 2022-09-16

## 2022-09-16 NOTE — TELEPHONE ENCOUNTER
Contact pt gretchen villalba. Wondering if pt would like for me to send order to cpap. [employer] or [employer] . She is aware they are on back order.

## 2022-09-16 NOTE — TELEPHONE ENCOUNTER
[address] [facility] can not do new set up because they do not have a Respiratory  therapist right now.

## 2022-09-19 ENCOUNTER — APPOINTMENT (OUTPATIENT)
Dept: CARDIAC REHAB | Age: 52
End: 2022-09-19
Payer: COMMERCIAL

## 2022-09-20 ENCOUNTER — OFFICE VISIT (OUTPATIENT)
Dept: CARDIOLOGY CLINIC | Age: 52
End: 2022-09-20
Payer: COMMERCIAL

## 2022-09-20 VITALS
HEIGHT: 67 IN | WEIGHT: 196 LBS | SYSTOLIC BLOOD PRESSURE: 118 MMHG | HEART RATE: 92 BPM | BODY MASS INDEX: 30.76 KG/M2 | DIASTOLIC BLOOD PRESSURE: 74 MMHG

## 2022-09-20 DIAGNOSIS — I25.10 CORONARY ARTERY DISEASE INVOLVING NATIVE CORONARY ARTERY OF NATIVE HEART WITHOUT ANGINA PECTORIS: Primary | ICD-10-CM

## 2022-09-20 DIAGNOSIS — E11.9 TYPE 2 DIABETES MELLITUS WITHOUT COMPLICATION, WITHOUT LONG-TERM CURRENT USE OF INSULIN (HCC): ICD-10-CM

## 2022-09-20 DIAGNOSIS — G47.33 OSA (OBSTRUCTIVE SLEEP APNEA): ICD-10-CM

## 2022-09-20 DIAGNOSIS — E78.5 DYSLIPIDEMIA: ICD-10-CM

## 2022-09-20 DIAGNOSIS — I10 ESSENTIAL HYPERTENSION WITH GOAL BLOOD PRESSURE LESS THAN 140/90: ICD-10-CM

## 2022-09-20 PROCEDURE — 99214 OFFICE O/P EST MOD 30 MIN: CPT | Performed by: INTERNAL MEDICINE

## 2022-09-20 PROCEDURE — 3052F HG A1C>EQUAL 8.0%<EQUAL 9.0%: CPT | Performed by: INTERNAL MEDICINE

## 2022-09-20 NOTE — PROGRESS NOTES
[address], [address] [place] [address], [address] [place] [address]  PHONE: [phone]    SUBJECTIVE: Will Gonzales (1970) is a 47 y.o. female seen for a follow up visit regarding the following:   Specialty Problems          Cardiology Problems    NSTEMI (non-ST elevated myocardial infarction) ([facility]        Essential hypertension with goal blood pressure less than 140/90        STEMI (ST elevation myocardial infarction) ([facility]        Acute inferior myocardial infarction ([facility]        CAD (coronary artery disease)         Patient presented to emergency room with chest pain and nausea 7/23/22 and was admitted with a diagnosis of NSTEMI. Catheratization was performed showing 99% blockage of mLAD between 2 previously placed stents. Stent was placed. Patient is not taking statins due to intolerance. Patient reports compliance with her medications. Past Medical History, Past Surgical History, Family history, Social History, and Medications were all reviewed with the patient today and updated as necessary. Patient serendipitously was found to have multiple nodules on a CAT scan she has a follow-up with pulmonary later this month. 9/20/22  Patient presents today from a cardiac standpoint appears to be stable and doing well. She though is having positional vertigo.   I have suggested online exercises that may help with this    Allergies   Allergen Reactions    Rosuvastatin Other (See Comments)    Eggs Or Egg-Derived Products     Tramadol Swelling    Butorphanol Palpitations    Cephalexin Rash and Swelling    Penicillins Rash    Ticagrelor Nausea And Vomiting     Past Medical History:   Diagnosis Date    Asthma     seasonal    CAD (coronary artery disease)     Depression with anxiety     Diabetes (HCC)     checks QD, last A1c 6.5, hyposymptoms at 80 , normal 100    Difficult intubation     during hysterectomy pt states small throat , request glidescope    GERD (gastroesophageal reflux disease) Hypertension     Nausea & vomiting     Obesity (BMI 30-39.9) 34    Panic attacks     Prediabetes     bs: 140's. diet controlled    Psychiatric disorder     ANXIETY    Thromboembolus ([address]     2014- ? ? Pulm Embolism after hysterectomy    Unspecified adverse effect of anesthesia     \"my heart stopped and quit breathing after hysterectomy on the way to PACU 11/7/14    Unspecified sleep apnea     uses cpap.       Past Surgical History:   Procedure Laterality Date    CARDIAC PROCEDURE N/A 7/25/2022    LEFT HEART CATH / CORONARY ANGIOGRAPHY performed by Juan Perez MD at [facility] CATH LAB    CARDIAC PROCEDURE N/A 7/25/2022    Percutaneous coronary intervention performed by Juan Perez MD at [facility] Bilateral     CHOLECYSTECTOMY      MT CARDIAC SURG PROCEDURE UNLIST      CATH -     SEPTOPLASTY      x 2 from [facility]    NADER AND BSO (CERVIX REMOVED)  11/7/14    TONSILLECTOMY  2006    US GUIDED CORE BREAST BIOPSY Bilateral 1993, 1995, 2000     Family History   Problem Relation Age of Onset    Heart Disease Mother     Diabetes Mother     Hypertension Mother       Social History     Tobacco Use    Smoking status: Never    Smokeless tobacco: Never   Substance Use Topics    Alcohol use: Not Currently       Current Outpatient Medications:     colchicine (COLCRYS) 0.6 MG tablet, Take 1 tablet by mouth in the morning., Disp: 30 tablet, Rfl: 3    Evolocumab (REPATHA SURECLICK) 134 MG/ML SOAJ, Inject 140 mg into the skin every 14 days, Disp: 2 pen, Rfl: 11    DULoxetine (CYMBALTA) 60 MG extended release capsule, Take 60 mg by mouth in the morning., Disp: , Rfl:     Dulaglutide (TRULICITY) 8.87 TO/1.8GO SOPN, Inject 0.75 mg into the skin Q 10-12 days, Disp: , Rfl:     aspirin 81 MG EC tablet, Take 81 mg by mouth every evening, Disp: , Rfl:     cetirizine (ZYRTEC) 10 MG tablet, Take 10 mg by mouth, Disp: , Rfl:     chlorthalidone (HYGROTON) 25 MG tablet, Take 25 mg by mouth every evening, Disp: , Rfl:     Cholecalciferol 100 MCG (4000 UT) CAPS, Take 8,000 Units by mouth, Disp: , Rfl:     clopidogrel (PLAVIX) 75 MG tablet, Take 75 mg by mouth daily, Disp: , Rfl:     empagliflozin (JARDIANCE) 25 MG tablet, Take 25 mg by mouth daily, Disp: , Rfl:     fluticasone-vilanterol (BREO ELLIPTA) 100-25 MCG/INH AEPB inhaler, 1 inhalation every morning, rinse mouth after use, Disp: , Rfl:     fluticasone (FLONASE) 50 MCG/ACT nasal spray, 2 sprays by Nasal route as needed, Disp: , Rfl:     guaiFENesin (MUCINEX MAXIMUM STRENGTH) 1200 MG TB12, Take 1,200 mg by mouth as needed, Disp: , Rfl:     Insulin Glargine, 2 Unit Dial, (TOUJEO MAX SOLOSTAR) 300 UNIT/ML SOPN, INJECT 10 15 UNITS UNDER THE SKIN DAILY, Disp: , Rfl:     levalbuterol (XOPENEX) 0.63 MG/3ML nebulization, 1 vial via nebulizer q 6 hours prn wheezing and shortness of breath, Disp: , Rfl:     levalbuterol (XOPENEX HFA) 45 MCG/ACT inhaler, Albuterol inhaler or nebulizer 4 times daily as needed. , Disp: , Rfl:     LORazepam (ATIVAN) 0.5 MG tablet, Take 0.5 mg by mouth every 4 hours as needed. , Disp: , Rfl:     losartan (COZAAR) 50 MG tablet, Take 50 mg by mouth daily, Disp: , Rfl:     metoprolol succinate (TOPROL XL) 50 MG extended release tablet, Take 50 mg by mouth daily, Disp: , Rfl:     niacin 500 MG tablet, Take 500 mg by mouth every morning (before breakfast), Disp: , Rfl:     nitroGLYCERIN (NITROSTAT) 0.4 MG SL tablet, Place 0.4 mg under the tongue, Disp: , Rfl:     ondansetron (ZOFRAN-ODT) 4 MG disintegrating tablet, Take 4 mg by mouth 3 times daily as needed, Disp: , Rfl:     potassium chloride (KLOR-CON M) 20 MEQ extended release tablet, Take 20 mEq by mouth daily, Disp: , Rfl:     ranolazine (RANEXA) 500 MG extended release tablet, Take 500 mg by mouth 2 times daily, Disp: , Rfl:     ROS:  Review of Systems - General ROS: negative for - chills, fatigue or fever  Hematological and Lymphatic ROS: negative for - bleeding problems, bruising or jaundice  Respiratory ROS: no cough, shortness of breath, or wheezing  Cardiovascular ROS: no chest pain or dyspnea on exertion  Gastrointestinal ROS: no abdominal pain, change in bowel habits, or black or bloody stools  Neurological ROS: no TIA or stroke symptoms  All other systems negative. Wt Readings from Last 3 Encounters:   09/20/22 196 lb (88.9 kg)   09/15/22 192 lb 3.2 oz (87.2 kg)   08/16/22 191 lb (86.6 kg)     Temp Readings from Last 3 Encounters:   09/15/22 97.3 °F (36.3 °C)   08/10/22 98 °F (36.7 °C) (Temporal)   07/26/22 98.3 °F (36.8 °C) (Oral)     BP Readings from Last 3 Encounters:   09/20/22 118/74   09/15/22 110/80   08/10/22 120/80     Pulse Readings from Last 3 Encounters:   09/20/22 92   09/15/22 81   08/10/22 100       PHYSICAL EXAM:  /74   Pulse 92   Ht 5' 7\" (1.702 m)   Wt 196 lb (88.9 kg)   BMI 30.70 kg/m²   Physical Examination: General appearance - alert, well appearing, and in no distress  Mental status - alert, oriented to person, place, and time  Eyes - pupils equal and reactive, extraocular eye movements intact  Neck/lymph - supple, no significant adenopathy  Chest/lungs - clear to auscultation, no wheezes, rales or rhonchi, symmetric air entry  Heart/CV - normal rate and regular rhythm  Abdomen/GI - soft, nontender, nondistended, no masses or organomegaly  Musculoskeletal - no joint tenderness, deformity or swelling  Extremities - peripheral pulses normal, no pedal edema, no clubbing or cyanosis  Skin - normal coloration and turgor, no rashes, no suspicious skin lesions noted    EKG:         Medications reviewed and questions answered    No results found for this or any previous visit (from the past 672 hour(s)). Lab Results   Component Value Date/Time    CHOL 145 07/25/2022 06:48 AM    HDL 26 07/25/2022 06:48 AM       ASSESSMENT and PLAN  No follow-up provider specified.  is for   Specialty Problems          Cardiology Problems    NSTEMI (non-ST elevated myocardial infarction) ([facility]        Essential hypertension with goal blood pressure less than 140/90        STEMI (ST elevation myocardial infarction) ([facility]        Acute inferior myocardial infarction ([facility]        CAD (coronary artery disease)          HTN:  Controlled, continue medications. MI:  Take Repatha as directed. Continue discharge medications. Consider increasing metoprolol to lower heart rate. CAD:  Take Repatha as directed. A1C was elevated at 8.1 on 7/25/22. Follow up with endocrinology/PCP for better glucose control. PLAN:  Problem List Items Addressed This Visit          Circulatory    Essential hypertension with goal blood pressure less than 140/90    CAD (coronary artery disease) - Primary       Endocrine    Type 2 diabetes mellitus without complication (HCC)       Respiratory    LUCERO (obstructive sleep apnea)       Other    Dyslipidemia      Patient with known history of CAD and multiple caths and stents in the past she presented recently with a non-STEMI and had an LAD stented. She is on appropriate guideline directed medical therapy but with her recent presentation as a non-STEMI we will add colchicine 0.6 mg daily    Patient is starting cardiac rehab soon. Her vertigo has interfered with her starting up to this point. Hopefully appropriate vertigo exercises will mitigate this.   Follow-up in 3 months    Continue meds as below    Current Outpatient Medications:     colchicine (COLCRYS) 0.6 MG tablet, Take 1 tablet by mouth in the morning., Disp: 30 tablet, Rfl: 3    Evolocumab (REPATHA SURECLICK) 107 MG/ML SOAJ, Inject 140 mg into the skin every 14 days, Disp: 2 pen, Rfl: 11    DULoxetine (CYMBALTA) 60 MG extended release capsule, Take 60 mg by mouth in the morning., Disp: , Rfl:     Dulaglutide (TRULICITY) 1.94 /9.6SQ SOPN, Inject 0.75 mg into the skin Q 10-12 days, Disp: , Rfl:     aspirin 81 MG EC tablet, Take 81 mg by mouth every evening, Disp: , Rfl:     cetirizine (ZYRTEC) 10 MG tablet, Take 10 mg by mouth, Disp: , Rfl:     chlorthalidone (HYGROTON) 25 MG tablet, Take 25 mg by mouth every evening, Disp: , Rfl:     Cholecalciferol 100 MCG (4000 UT) CAPS, Take 8,000 Units by mouth, Disp: , Rfl:     clopidogrel (PLAVIX) 75 MG tablet, Take 75 mg by mouth daily, Disp: , Rfl:     empagliflozin (JARDIANCE) 25 MG tablet, Take 25 mg by mouth daily, Disp: , Rfl:     fluticasone-vilanterol (BREO ELLIPTA) 100-25 MCG/INH AEPB inhaler, 1 inhalation every morning, rinse mouth after use, Disp: , Rfl:     fluticasone (FLONASE) 50 MCG/ACT nasal spray, 2 sprays by Nasal route as needed, Disp: , Rfl:     guaiFENesin (MUCINEX MAXIMUM STRENGTH) 1200 MG TB12, Take 1,200 mg by mouth as needed, Disp: , Rfl:     Insulin Glargine, 2 Unit Dial, (TOUJEO MAX SOLOSTAR) 300 UNIT/ML SOPN, INJECT 10 15 UNITS UNDER THE SKIN DAILY, Disp: , Rfl:     levalbuterol (XOPENEX) 0.63 MG/3ML nebulization, 1 vial via nebulizer q 6 hours prn wheezing and shortness of breath, Disp: , Rfl:     levalbuterol (XOPENEX HFA) 45 MCG/ACT inhaler, Albuterol inhaler or nebulizer 4 times daily as needed. , Disp: , Rfl:     LORazepam (ATIVAN) 0.5 MG tablet, Take 0.5 mg by mouth every 4 hours as needed. , Disp: , Rfl:     losartan (COZAAR) 50 MG tablet, Take 50 mg by mouth daily, Disp: , Rfl:     metoprolol succinate (TOPROL XL) 50 MG extended release tablet, Take 50 mg by mouth daily, Disp: , Rfl:     niacin 500 MG tablet, Take 500 mg by mouth every morning (before breakfast), Disp: , Rfl:     nitroGLYCERIN (NITROSTAT) 0.4 MG SL tablet, Place 0.4 mg under the tongue, Disp: , Rfl:     ondansetron (ZOFRAN-ODT) 4 MG disintegrating tablet, Take 4 mg by mouth 3 times daily as needed, Disp: , Rfl:     potassium chloride (KLOR-CON M) 20 MEQ extended release tablet, Take 20 mEq by mouth daily, Disp: , Rfl:     ranolazine (RANEXA) 500 MG extended release tablet, Take 500 mg by mouth 2 times daily, Disp: , Rfl:     Kuldip Cash MD  9/20/2022  11:25 AM    Pt is instructed to follow all appropriate cardiac risk factor recommendations and to be compliant with meds and testing. Instructed to follow up appropriately and seek urgent medical care if acute or unstable issues arise.  Results of some tests may be viewed thru [address] but this does not substitute for follow up with MD. If follow up is not scheduled pt is instructed to call for follow up

## 2022-09-21 ENCOUNTER — APPOINTMENT (OUTPATIENT)
Dept: CARDIAC REHAB | Age: 52
End: 2022-09-21
Payer: COMMERCIAL

## 2022-09-22 ENCOUNTER — HOSPITAL ENCOUNTER (OUTPATIENT)
Dept: CARDIAC REHAB | Age: 52
Setting detail: RECURRING SERIES
Discharge: HOME | End: 2022-09-25
Payer: COMMERCIAL

## 2022-09-22 VITALS — HEIGHT: 67 IN | WEIGHT: 190.6 LBS | BODY MASS INDEX: 29.91 KG/M2

## 2022-09-22 PROCEDURE — 93798 PHYS/QHP OP CAR RHAB W/ECG: CPT

## 2022-09-22 ASSESSMENT — EXERCISE STRESS TEST
PEAK_BP: 136/70
PEAK_BP: 136/70
PEAK_HR: 109
PEAK_METS: 1.8

## 2022-09-22 ASSESSMENT — EJECTION FRACTION: EF_VALUE: 62

## 2022-09-22 ASSESSMENT — LIFESTYLE VARIABLES: SMOKELESS_TOBACCO: NO

## 2022-09-23 ENCOUNTER — APPOINTMENT (OUTPATIENT)
Dept: CARDIAC REHAB | Age: 52
End: 2022-09-23
Payer: COMMERCIAL

## 2022-09-26 ENCOUNTER — FOLLOWUP TELEPHONE ENCOUNTER (OUTPATIENT)
Dept: DIABETES SERVICES | Age: 52
End: 2022-09-26

## 2022-09-28 ENCOUNTER — HOSPITAL ENCOUNTER (OUTPATIENT)
Dept: CARDIAC REHAB | Age: 52
Setting detail: RECURRING SERIES
Discharge: HOME | End: 2022-10-01
Payer: COMMERCIAL

## 2022-09-28 ENCOUNTER — FOLLOWUP TELEPHONE ENCOUNTER (OUTPATIENT)
Dept: DIABETES SERVICES | Age: 52
End: 2022-09-28

## 2022-09-28 VITALS — BODY MASS INDEX: 30.07 KG/M2 | WEIGHT: 192 LBS

## 2022-09-28 PROCEDURE — 93798 PHYS/QHP OP CAR RHAB W/ECG: CPT

## 2022-09-28 ASSESSMENT — EXERCISE STRESS TEST
PEAK_BP: 112/82
PEAK_METS: 2.1
PEAK_HR: 111

## 2022-10-03 ENCOUNTER — OFFICE VISIT (OUTPATIENT)
Dept: ENT CLINIC | Age: 52
End: 2022-10-03
Payer: COMMERCIAL

## 2022-10-03 VITALS — RESPIRATION RATE: 18 BRPM | WEIGHT: 193 LBS | HEIGHT: 67 IN | BODY MASS INDEX: 30.29 KG/M2

## 2022-10-03 DIAGNOSIS — H93.13 TINNITUS OF BOTH EARS: ICD-10-CM

## 2022-10-03 DIAGNOSIS — H81.20 VESTIBULAR NEURONITIS, UNSPECIFIED LATERALITY: Primary | ICD-10-CM

## 2022-10-03 PROCEDURE — 99204 OFFICE O/P NEW MOD 45 MIN: CPT | Performed by: OTOLARYNGOLOGY

## 2022-10-03 RX ORDER — BUPROPION HYDROCHLORIDE 150 MG/1
TABLET ORAL
COMMUNITY
Start: 2022-09-27

## 2022-10-03 RX ORDER — HYDROXYZINE HYDROCHLORIDE 10 MG/1
TABLET, FILM COATED ORAL
COMMUNITY
Start: 2022-09-27

## 2022-10-03 RX ORDER — INSULIN LISPRO 100 [IU]/ML
INJECTION, SOLUTION INTRAVENOUS; SUBCUTANEOUS
COMMUNITY
Start: 2022-09-30

## 2022-10-03 RX ORDER — HEPARIN SODIUM 10000 [USP'U]/100ML
INJECTION, SOLUTION INTRAVENOUS CONTINUOUS
COMMUNITY
Start: 2022-07-24

## 2022-10-03 ASSESSMENT — ENCOUNTER SYMPTOMS
ABDOMINAL PAIN: 0
SHORTNESS OF BREATH: 0
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
## 2022-10-11 ENCOUNTER — TELEPHONE (OUTPATIENT)
Dept: DIABETES SERVICES | Age: 52
End: 2022-10-11

## 2022-10-11 NOTE — TELEPHONE ENCOUNTER
Since mail box is full and unable to reach patient, sent e mail to staff if Cardiac Rehab to let patient know tomorrow, that her DME is covered at 100%. We will need to schedule if still interested.

## 2022-10-12 ENCOUNTER — TELEPHONE (OUTPATIENT)
Dept: DIABETES SERVICES | Age: 52
End: 2022-10-12

## 2022-10-12 NOTE — TELEPHONE ENCOUNTER
Third call about DME. Patient answered. Instructed education is covered 100%. She wants to call back to schedule. She said if we do not hear back by Monday call her back. Instructed she can schedule also when comes in for Cardiac Rehab.

## 2022-10-18 ENCOUNTER — TELEPHONE (OUTPATIENT)
Dept: PULMONOLOGY | Age: 52
End: 2022-10-18

## 2022-10-18 ENCOUNTER — TELEPHONE (OUTPATIENT)
Dept: DIABETES SERVICES | Age: 52
End: 2022-10-18

## 2022-10-18 DIAGNOSIS — R91.1 PULMONARY NODULE: Primary | ICD-10-CM

## 2022-10-18 NOTE — TELEPHONE ENCOUNTER
Radiology needs a new order foe a CT scan sent the other is locked and will only allow them to schedule 1 certain day

## 2022-10-18 NOTE — TELEPHONE ENCOUNTER
Patient reported to Cardiac Rehabilitation staff member that is she decides she wants to attend Diabetes education, she will give us a call. Will close file and notify referring.

## 2022-10-25 ENCOUNTER — TELEPHONE (OUTPATIENT)
Dept: CARDIOLOGY CLINIC | Age: 52
End: 2022-10-25

## 2022-10-25 NOTE — TELEPHONE ENCOUNTER
Release point sent over a Cardiac assessment forn that was put in [email or web address] folder to go back to her box in the morning.

## 2022-10-27 ENCOUNTER — HOSPITAL ENCOUNTER (OUTPATIENT)
Dept: CT IMAGING | Age: 52
Discharge: HOME | End: 2022-10-30
Payer: COMMERCIAL

## 2022-10-27 DIAGNOSIS — R91.1 PULMONARY NODULE: ICD-10-CM

## 2022-10-27 PROCEDURE — 71250 CT THORAX DX C-: CPT

## 2022-10-28 ENCOUNTER — HOSPITAL ENCOUNTER (OUTPATIENT)
Dept: PHYSICAL THERAPY | Age: 52
Setting detail: RECURRING SERIES
Discharge: HOME | End: 2022-10-31
Payer: COMMERCIAL

## 2022-10-28 PROCEDURE — 97112 NEUROMUSCULAR REEDUCATION: CPT

## 2022-10-28 PROCEDURE — 97162 PT EVAL MOD COMPLEX 30 MIN: CPT

## 2022-10-28 ASSESSMENT — PAIN SCALES - GENERAL: PAINLEVEL_OUTOF10: 0

## 2022-10-28 NOTE — PROGRESS NOTES
Derick Stephenslasue  : 1970  Primary: [address]  Secondary:  Cloyce Gun  [address] [place] [address]  Phone: [phone]  Fax: [phone] Plan Frequency: 1-2 times per week for 8-12 weeks  Plan of Care/Certification Expiration Date: 23    PT Visit Info: Total # of Visits to Date: 1  Progress Note Counter: 1  Progress Note Due Date: 22   Visit Count:  1   OUTPATIENT PHYSICAL THERAPY:OP NOTE TYPE: Treatment Note 10/28/2022       Episode  }Appt Desk             Treatment Diagnosis:  Other abnormalities of gait and mobility (R26.89)  Dizziness and Giddiness (R42)  Medical/Referring Diagnosis:  No admission diagnoses are documented for this encounter. R42 (ICD-10-CM) - Dizziness and giddiness  Referring Physician:  Mich Dowd NP MD Orders:  PT Eval and Treat   Date of Onset:  No data recorded   Allergies:   Amlodipine, Lisinopril, Olmesartan, Paroxetine hcl, Hydrochlorothiazide, Rosuvastatin, Eggs or egg-derived products, Indapamide, Semaglutide, Tramadol, Butorphanol, Cephalexin, Penicillins, and Ticagrelor  Restrictions/Precautions:  No data recorded  No data recorded   Interventions Planned (Treatment may consist of any combination of the following):    Current Treatment Recommendations: Strengthening; Balance training; Gait training; Neuromuscular re-education; Home exercise program; Vestibular rehab     Subjective Comments:  I'm tired of being dizzy. Initial:}    0/10Post Session:       0/10  Medications Last Reviewed:  10/28/2022  Updated Objective Findings:  See evaluation note from today  Treatment   NEUROMUSCULAR RE-EDUCATION: (15 minutes):    Exercise/activities per grid below to improve balance, coordination, kinesthetic sense, posture, and proprioception. Required minimal verbal cues to promote static and dynamic balance in standing.   Activity   Date  10/28/22 Date Date Date Date Date   Activity/Exercise Sets/reps/equipment Sets/reps/  equipment Sets/reps/  equipment Sets/reps/  equipment Sets/reps/  equipment Sets/reps/  equipment   Walking with head turns             Walking with head up & down             Step ups             Step taps             Marching           Sidestepping           Crossovers           [place]imar           Walking  backwards             Tandem walking           Weaving in/out of cones             Picking up cones             Sports cord             [employer] ball           Figure 8s            Circles right/left           Walking with 360 degree turns           Spirals           Weight shifting:    Left & Right             Weight shifting: Forward & Backward              Static Standing Balance             Standing with feet apart             Standing with feet together             Standing with feet semitandem           Standing with feet tandem           Single leg stance           X1/X2 Viewing exercises             Hallpike-[place] testing for BPPV (Benign Paroxysmal Positional Vertigo)     Positive R side        Fuentes-Daroff exercises           Canalith Repositioning treatment/Epley Maneuver  for BPPV (Benign Paroxysmal Positional Vertigo)   1) R Epley    2) R Appiani        Smart [employer] Training: See scanned report. Pt education Educated patient to avoid bending over/looking up today, and to sleep on a few pillows on back or L side today/tonight;  educated about BPPV and therapy process            Treatment/Session Summary:    Treatment Assessment:  Patient tolerated session well with no nausea. Patient has R BPPV which was treated with R Epley. With 2nd Barbra-Hallpike, there was a conversion to R horizontal BPPV, therefore, did R Appiani. Patient reported no dizziness at end of session.   Communication/Consultation:  None today  Equipment provided today:  HEP  Recommendations/Intent for next treatment session: Next visit will focus on balance and vestibular glen, Epley.     Total Treatment Billable Duration:  15 minutes + evaluation  Time In: 0805  Time Out: 0845    Elizabeth Patel, PT       Charge Capture  }Post Session Pain  PT Visit Info  [facility] Portal  MD Guidelines  Scanned Media  Benefits  MyChart    Future Appointments   Date Time Provider Uriel Leone   11/1/2022  8:00 AM Lavell Patel, PT [facility]   11/4/2022  8:45 AM Vertie Seeds, PT [facility]   11/9/2022  8:45 AM Vertie Seeds, PT [facility]   11/11/2022  9:30 AM Vertie Seeds, PT [facility]   12/15/2022  9:00 AM MD SUDHAKAR Gaitan AMB   12/16/2022 11:20 AM Elie Kang APRN - CNP [facility] AMB

## 2022-10-28 NOTE — THERAPY EVALUATION
Rupal Sherman  : 1970  Primary: Nika Hernandez  Secondary:  Evelin Cheatham  [address] [facility] [address]  Phone: [phone]  Fax: [phone] Plan Frequency: 1-2 times per week for 8-12 weeks  Plan of Care/Certification Expiration Date: 23    PT Visit Info: Total # of Visits to Date: 1  Progress Note Counter: 1  Progress Note Due Date: 22    Visit Count:  1                OUTPATIENT PHYSICAL THERAPY:             OP NOTE TYPE: Initial Assessment 10/28/2022               Episode  Appt Desk         Treatment Diagnosis:  Other abnormalities of gait and mobility (R26.89)  Dizziness and Giddiness (R42)  Medical/Referring Diagnosis:  No admission diagnoses are documented for this encounter. R42 (ICD-10-CM) - Dizziness and giddiness  Referring Physician:  Vida Bryant NP MD Orders:  PT Eval and Treat   Return MD Appt:    Date of Onset:      Allergies:  Amlodipine, Lisinopril, Olmesartan, Paroxetine hcl, Hydrochlorothiazide, Rosuvastatin, Eggs or egg-derived products, Indapamide, Semaglutide, Tramadol, Butorphanol, Cephalexin, Penicillins, and Ticagrelor  Restrictions/Precautions:     Cardiac history      Medications Last Reviewed:  10/28/2022     SUBJECTIVE   History of Injury/Illness (Reason for Referral): Turn to R look to R, get vertigo. Vomited the first day. Has been propping up on pillows, but still rolls to R. Ringing in ears since covid. No pain. Has had some falls due to vertigo. Had heart attack  and got a virus too. ENT notes 10/3/22 indicate suspected vestibular neuritis  PER ENT note 10/3/22: Rupal Sherman is a 47 y.o. female seen today in initial consultation for tinnitus and dizziness. She went into the ED at end of July w/ a viral illness and had acute MI.  She spent several days in hospital recovering and then when she got home, she started having a couple days of bad room-spinning vertigo, N/V and felt very off balance. Since then, she continues to have some episodic dizziness, which is mainly positional in nature- usually when turning her head/rolling over in bed to R side and extended her neck. This occurs daily and spinning sensation lasts for up to 30 secs. No further N/V. She has long h/o bilateral tinnitus which is present all the time. No relieving or aggravating factors. She had a hearing test down in [facility] earlier this yr which apparently revealed some mild high freq HL. This is new complaint and she has no previous otologic hx. She has not yet seen PT and tried the Epley maneuver. She was worked up w/ brain MRI recently which was clear. \"  Patient Stated Goal(s): \"To not be dizzy\"  Initial:     0/10 Post Session:     0/10  Past Medical History/Comorbidities:   Ms. Mandy Mccarthy  has a past medical history of Asthma, CAD (coronary artery disease), Depression with anxiety, Diabetes ([address] Difficult intubation, GERD (gastroesophageal reflux disease), Hypertension, Nausea & vomiting, Obesity (BMI 30-39.9), Panic attacks, Prediabetes, Psychiatric disorder, Thromboembolus ([address] Unspecified adverse effect of anesthesia, and Unspecified sleep apnea. Ms. Mandy Mccarthy  has a past surgical history that includes us guided core breast biopsy (Bilateral, 1993, 1995, 2000); pr cardiac surg procedure unlist; Septoplasty; Total abdominal hysterectomy w/ bilateral salpingoophorectomy (11/7/14); Cholecystectomy; Tonsillectomy (2006); Carpal tunnel release (Bilateral); Cardiac procedure (N/A, 7/25/2022); and Cardiac procedure (N/A, 7/25/2022).   Social History/Living Environment:   Lives With: Spouse     Prior Level of Function/Work/Activity:   Occupation: Full time employment  ADL Assistance: Independent  Active : Yes  Mode of Transportation: Car        Learning:   Does the patient/guardian have any barriers to learning?: No barriers  Will there be a co-learner?: No  What is the preferred language of the patient/guardian?: English  Is an  required?: No  How does the patient/guardian prefer to learn new concepts?: Listening; Reading; Demonstration; Pictures/Videos     Fall Risk Scale: Gutierrez Total Score: 40  Gutierrez Fall Risk: Medium (25-44)     Personal Factors:        Sex:  female        Age:  47 y.o. OBJECTIVE   Observations:      WNL  Functional Mobility:    independent  Sensation:   intact  Bed Mobility:   independent  Transfers:   independent  Strength:   LE STRENGTH:  4/5 B LE    Ambulation/WC:     Patient ambulates with no AD. She tends to walks stiffly. Balance / Vestibular:   Cervical AROM:  Cervical Spine AROM : WFL  Visual/Oculomotor:  Visual/Occulomotor Assessed: Yes  Spontaneous Nystagmus: Intact  Gaze Holding Nystagmus: Yes  Eye Movement Range: Normal  Smooth pursuits horizontal: Able to track stimulus in all quadrants without difficulty  Smooth pursuits vertical: Able to track stimulus in all quadrants without difficulty  Skew Deviation: No  VOR Tests:  Horizontal Option: Intact  Vertical Option: Intact  Cancellation Horizontal Option: Intact  Cancellation Vertical Option: Intact  Head Thrust: Intact    Positional Tests:  Right Barbra-Hallpike Test: Nystagmus, Vertigo (first [place]-Hallpike)  Left [place]-Hallpike Test: Negative  Right Horizontal Roll Test: Nystagmus, Vertigo (positive with 2nd Barbra-Hallpike)  ASSESSMENT   Initial Assessment:  Patient presents with c/o vertigo and imbalance since July 2022. Patient c/o vertigo with turning head to R and down, as well as bending over, looking up, and rolling to the right. Patient has had some falls since vertigo started. Patient tested positively for R BPPV and was treated today with canalith repositioning maneuvers. Will reassess and treat further as needed next session. Patient would benefit from PT to address these problems to improve patient's independence and safety with mobility and daily activities. Thank you.     Problem List: (Impacting functional limitations): Body Structures, Functions, Activity Limitations Requiring Skilled Therapeutic Intervention: Decreased functional mobility ; Decreased tolerance to work activity; Decreased strength; Decreased balance; Vestibular Impairment     Therapy Prognosis:   Therapy Prognosis: Good     Assessment Complexity:   Medium Complexity  PLAN   Effective Dates: 10/27/2022 TO Plan of Care/Certification Expiration Date: 01/26/23   Frequency/Duration: Plan Frequency: 1-2 times per week for 8-12 weeks   Interventions Planned (Treatment may consist of any combination of the following):    Current Treatment Recommendations: Strengthening; Balance training; Gait training; Neuromuscular re-education; Home exercise program; Vestibular rehab     Goals: (Goals have been discussed and agreed upon with patient.)  Short-Term Functional Goals: Time Frame: 2-4 weeks  Patient will demonstrate independence and compliance with home exercise program to improve balance and dizziness for daily activities. Discharge Goals: Time Frame: 8-12 weeks    Patient will increase her score on the dynamic gait index to greater than or equal to 22/24 indicating improved balance and safety for community ambulation. Patient will ambulate with least assistive device over level and unlevel surfaces without evidence of imbalance to improve safety for daily activities. Patient will report decreased dizziness to less than or equal to 1/10 with daily activities to improve safety and quality of life. Outcome Measure: Tool Used: Dynamic Gait Index  Score:  Initial: 18/24 Most Recent: X/24 (Date: -- )   Interpretation of Score: Each section is scored on a 0-3 scale, 0 representing the patients inability to perform the task and 3 representing independence. The scores of each section are added together for a total score of 24. Any score below 19 indicates increased risk for falls.         Medical Necessity:   > Patient is expected to demonstrate progress in balance, functional technique, and dizziness to improve safety during daily activities. Reason For Services/Other Comments:  > Patient continues to demonstrate capacity to improve balance, dizziness which will increase independence and increase safety. Total Duration:  Time In: 0805  Time Out: 0845    Regarding Prabhukumarrosanna Luu's therapy, I certify that the treatment plan above will be carried out by a therapist or under their direction.   Thank you for this referral,  Elma Kaufman, PT     Referring Physician Signature: Esperanza Cheema NP _______________________________ Date _____________        Post Session Pain  Charge Capture  PT Visit Info MD Guidelines  [place]

## 2022-11-01 ENCOUNTER — APPOINTMENT (OUTPATIENT)
Dept: PHYSICAL THERAPY | Age: 52
End: 2022-11-01
Payer: COMMERCIAL

## 2022-11-01 ENCOUNTER — HOSPITAL ENCOUNTER (OUTPATIENT)
Dept: PHYSICAL THERAPY | Age: 52
Setting detail: RECURRING SERIES
Discharge: HOME | End: 2022-11-04
Payer: COMMERCIAL

## 2022-11-01 PROCEDURE — 97112 NEUROMUSCULAR REEDUCATION: CPT

## 2022-11-01 ASSESSMENT — PAIN SCALES - GENERAL: PAINLEVEL_OUTOF10: 0

## 2022-11-01 NOTE — PROGRESS NOTES
Magnus Shelby  : 1970  Primary: Nika Hernandez  Secondary:  Irving Dobson  [address] [facility] [address]  Phone: [phone]  Fax: [phone] Plan Frequency: 1-2 times per week for 8-12 weeks  Plan of Care/Certification Expiration Date: 23      PT Visit Info: Total # of Visits to Date: 2  Progress Note Counter: 2  Progress Note Due Date: 22     Visit Count:  2   OUTPATIENT PHYSICAL THERAPY:OP NOTE TYPE: Treatment Note 2022       Episode  }Appt Desk             Treatment Diagnosis:  Other abnormalities of gait and mobility (R26.89)  Dizziness and Giddiness (R42)  Medical/Referring Diagnosis:  No admission diagnoses are documented for this encounter. R42 (ICD-10-CM) - Dizziness and giddiness  Referring Physician:  Suzy Lal NP MD Orders:  PT Eval and Treat   Date of Onset:  No data recorded   Allergies:   Amlodipine, Lisinopril, Olmesartan, Paroxetine hcl, Hydrochlorothiazide, Rosuvastatin, Eggs or egg-derived products, Indapamide, Semaglutide, Tramadol, Butorphanol, Cephalexin, Penicillins, and Ticagrelor  Restrictions/Precautions:  No data recorded  No data recorded   Interventions Planned (Treatment may consist of any combination of the following):    Current Treatment Recommendations: Strengthening; Balance training; Gait training; Neuromuscular re-education; Home exercise program; Vestibular rehab     Subjective Comments:  Patient reports treatment did help. Initial:}    0/10Post Session:       0/10  Medications Last Reviewed:  2022  Updated Objective Findings:  None Today  Treatment   NEUROMUSCULAR RE-EDUCATION: (45 minutes):    Exercise/activities per grid below to improve balance, coordination, kinesthetic sense, posture, and proprioception. Required minimal verbal cues to promote static and dynamic balance in standing.   Activity   Date  10/28/22 Date  22 Date Date Date Date   Activity/Exercise Sets/reps/equipment Sets/reps/  equipment Sets/reps/  equipment Sets/reps/  equipment Sets/reps/  equipment Sets/reps/  equipment   Walking with head turns      4 laps       Walking with head up & down      4 laps       Step ups      6 inch  2x10 reps       Step taps      6 inch  10 reps  Crossstap 10 reps       Marching    4 laps       Sidestepping    4 laps       Crossovers           [place]           Walking  backwards             Tandem walking           Weaving in/out of cones             Picking up cones             Sports cord             [employer] ball           Figure 8s            Circles right/left           Walking with 360 degree turns           Stepping over half foam    Forward 10 reps bilateral  Lateral  10 reps bilateral       Weight shifting:    Left & Right             Weight shifting: Forward & Backward              Static Standing Balance             Standing with feet apart             Standing with feet together      Eyes closed       Standing with feet semitandem    Eyes open and eyes closed       Standing with feet tandem           Single leg stance           X1/X2 Viewing exercises             Hallpike-Barbra testing for BPPV (Benign Paroxysmal Positional Vertigo)     Positive R side Negative right       Fuentes-Daroff exercises           Canalith Repositioning treatment/Epley Maneuver  for BPPV (Benign Paroxysmal Positional Vertigo)   1) R Epley    2) R Appiani        Smart Equitest Training: See scanned report. Pt education Educated patient to avoid bending over/looking up today, and to sleep on a few pillows on back or L side today/tonight;  educated about BPPV and therapy process            Treatment/Session Summary:    Treatment Assessment:  RIght BPPV resolved. Patient needs rest breaks due to increased fatigue. Patient given home exercise program consisting of balance exercises.   Communication/Consultation:  None today  Equipment provided today: HEP  Recommendations/Intent for next treatment session: Next visit will focus on balance and vestibular training.     Total Treatment Billable Duration:  45 minutes  Time In: 0800  Time Out: 0845    Sagar Martinez, PT       Charge Capture  }Post Session Pain  PT Visit Info  [facility] Portal  MD Guidelines  Scanned Media  Benefits  MyChart    Future Appointments   Date Time Provider Uriel Sulema   11/4/2022  8:45 AM Corie Pill, PT [facility]   11/9/2022  8:45 AM Corie Pill, PT [facility]   11/11/2022  9:30 AM Corie Pill, PT [facility]   11/14/2022  9:30 AM Corie Pill, PT [facility]   11/17/2022 10:15 AM Danielle Mackintosh, PT [facility]   11/21/2022  9:30 AM Danielle Mackintosh, PT [facility]   11/23/2022 11:00 AM Alisha Chol, PT [facility]   11/28/2022  9:30 AM Danielle Mackintosh, PT [facility]   11/30/2022  9:30 AM Corie Pill, PT [facility]   12/15/2022  9:00 AM MD SUDHAKAR Mosqueda AMB   12/16/2022 11:20 AM Catherine Kang APRN - [facility]MA [facility] AM[facility]

## 2022-11-04 ENCOUNTER — HOSPITAL ENCOUNTER (OUTPATIENT)
Dept: PHYSICAL THERAPY | Age: 52
Setting detail: RECURRING SERIES
Discharge: HOME | End: 2022-11-07
Payer: COMMERCIAL

## 2022-11-04 PROCEDURE — 97112 NEUROMUSCULAR REEDUCATION: CPT

## 2022-11-04 ASSESSMENT — PAIN SCALES - GENERAL: PAINLEVEL_OUTOF10: 0

## 2022-11-04 NOTE — PROGRESS NOTES
Jaret Sanchez  : 1970  Primary: Nika Hernandez  Secondary:  Genesis Carmona  [address] [facility] [address]  Phone: [phone]  Fax: [phone] Plan Frequency: 1-2 times per week for 8-12 weeks  Plan of Care/Certification Expiration Date: 23      PT Visit Info: Total # of Visits to Date: 3  Progress Note Counter: 3  Progress Note Due Date: 22     Visit Count:  3   OUTPATIENT PHYSICAL THERAPY:OP NOTE TYPE: Treatment Note 2022       Episode  }Appt Desk             Treatment Diagnosis:  Other abnormalities of gait and mobility (R26.89)  Dizziness and Giddiness (R42)  Medical/Referring Diagnosis:  No admission diagnoses are documented for this encounter. R42 (ICD-10-CM) - Dizziness and giddiness  Referring Physician:  Pablo Ruiz NP MD Orders:  PT Eval and Treat   Date of Onset:  No data recorded   Allergies:   Amlodipine, Lisinopril, Olmesartan, Paroxetine hcl, Hydrochlorothiazide, Rosuvastatin, Eggs or egg-derived products, Indapamide, Semaglutide, Tramadol, Butorphanol, Cephalexin, Penicillins, and Ticagrelor  Restrictions/Precautions:  No data recorded  No data recorded   Interventions Planned (Treatment may consist of any combination of the following):    Current Treatment Recommendations: Strengthening; Balance training; Gait training; Neuromuscular re-education; Home exercise program; Vestibular rehab     Subjective Comments:   Vertigo is better. Initial:}    0/10Post Session:       0/10  Medications Last Reviewed:  2022  Updated Objective Findings:  None Today  Treatment   NEUROMUSCULAR RE-EDUCATION: (45 minutes):    Exercise/activities per grid below to improve balance, coordination, kinesthetic sense, posture, and proprioception. Required minimal verbal cues to promote static and dynamic balance in standing.   Activity   Date  10/28/22 Date  22 Date  22 Date Date Date   Activity/Exercise Sets/reps/equipment Sets/reps/  equipment Sets/reps/  equipment Sets/reps/  equipment Sets/reps/  equipment Sets/reps/  equipment   Walking with head turns      4 laps 4 laps      Walking with head up & down      4 laps 4 laps      Step ups      6 inch  2x10 reps 6 inch  2x10 reps      Step taps      6 inch  10 reps  Crossstap 10 reps 6 inch  10 reps  Crossstap 10 reps      Marching    4 laps 4 laps      Sidestepping    4 laps 4 laps      Crossovers           [place]           Walking  backwards       4 Laps      Tandem walking           Weaving in/out of cones       4 laps      Walking in long damon      Working on heel strike, stride length and arm swing. Sports cord             [employer] ball           Figure 8s            Circles right/left           Walking with 360 degree turns           Stepping over half foam    Forward 10 reps bilateral  Lateral  10 reps bilateral Forward 10 reps bilateral  Lateral  10 reps bilateral      Weight shifting:    Left & Right             Weight shifting: Forward & Backward              Static Standing Balance             Standing with feet apart       Blue foams eyes open with head turns and up/down, blue foams eyes closed      Standing with feet together      Eyes closed Eyes closed      Standing with feet semitandem    Eyes open and eyes closed Blue foams Eyes open and eyes closed      Standing with feet tandem           Single leg stance           X1/X2 Viewing exercises             Hallpike-Abrbra testing for BPPV (Benign Paroxysmal Positional Vertigo)     Positive R side Negative right       Fuentes-Daroff exercises           Canalith Repositioning treatment/Epley Maneuver  for BPPV (Benign Paroxysmal Positional Vertigo)   1) R Epley    2) R Appiani        Smart Equitest Training: See scanned report.            Pt education Educated patient to avoid bending over/looking up today, and to sleep on a few pillows on back or L side today/tonight;  educated about BPPV and therapy process            Treatment/Session Summary:    Treatment Assessment:  Patient is demonstrating improving balance, and no further dizziness. Communication/Consultation:  None today  Equipment provided today:  HEP  Recommendations/Intent for next treatment session: Next visit will focus on balance and vestibular training.     Total Treatment Billable Duration:  45 minutes  Time In: 0845  Time Out: 0930    GEMMA Newell, PT       Charge Capture  }Post Session Pain  PT Visit Info  [facility] Portal  MD Guidelines  Scanned Media  Benefits  MyChart    Future Appointments   Date Time Provider Uriel Sulema   11/9/2022  8:45 AM Katherine Escalante, PT [facility] SFE   11/9/2022  3:20 PM Simeon Davidson MD [facility] AMB   11/11/2022  9:30 AM Katherine Escalante, PT [facility]   11/14/2022  9:30 AM Katherine Escalante, PT [facility]   11/17/2022 10:15 AM Kailee Napier, PT [facility]   11/21/2022  9:30 AM Dieter Chaves, PT [facility]   11/23/2022 11:00 AM Geri Villanueva, PT [facility]   11/28/2022  9:30 AM Dieter Chaves, PT [facility]   11/30/2022  9:30 AM Katherine Escalante, PT [facility]   12/15/2022  9:00 AM René Mathias MD [facility]   12/16/2022 11:20 AM Braeden Kang, APRN - CNP [facility] AMB

## 2022-11-07 ENCOUNTER — TELEPHONE (OUTPATIENT)
Dept: CARDIOLOGY CLINIC | Age: 52
End: 2022-11-07

## 2022-11-07 NOTE — TELEPHONE ENCOUNTER
Forms form [employer] came in on the fax.  Placed in clinical Care team member's basket ( Monse Steinberg) Heidi Roman

## 2022-11-09 ENCOUNTER — HOSPITAL ENCOUNTER (OUTPATIENT)
Dept: PHYSICAL THERAPY | Age: 52
Setting detail: RECURRING SERIES
Discharge: HOME | End: 2022-11-12
Payer: COMMERCIAL

## 2022-11-09 ENCOUNTER — OFFICE VISIT (OUTPATIENT)
Dept: PULMONOLOGY | Age: 52
End: 2022-11-09
Payer: COMMERCIAL

## 2022-11-09 VITALS
HEART RATE: 110 BPM | HEIGHT: 67 IN | TEMPERATURE: 98.6 F | BODY MASS INDEX: 29.66 KG/M2 | DIASTOLIC BLOOD PRESSURE: 94 MMHG | SYSTOLIC BLOOD PRESSURE: 129 MMHG | OXYGEN SATURATION: 98 % | RESPIRATION RATE: 16 BRPM | WEIGHT: 189 LBS

## 2022-11-09 DIAGNOSIS — J45.40 MODERATE PERSISTENT ASTHMA, UNCOMPLICATED: ICD-10-CM

## 2022-11-09 DIAGNOSIS — J40 BRONCHITIS: Primary | ICD-10-CM

## 2022-11-09 DIAGNOSIS — R00.0 TACHYCARDIA: ICD-10-CM

## 2022-11-09 DIAGNOSIS — R91.8 LUNG NODULES: ICD-10-CM

## 2022-11-09 PROCEDURE — 3078F DIAST BP <80 MM HG: CPT | Performed by: INTERNAL MEDICINE

## 2022-11-09 PROCEDURE — 97112 NEUROMUSCULAR REEDUCATION: CPT

## 2022-11-09 PROCEDURE — 99214 OFFICE O/P EST MOD 30 MIN: CPT | Performed by: INTERNAL MEDICINE

## 2022-11-09 PROCEDURE — 97110 THERAPEUTIC EXERCISES: CPT

## 2022-11-09 PROCEDURE — 3074F SYST BP LT 130 MM HG: CPT | Performed by: INTERNAL MEDICINE

## 2022-11-09 RX ORDER — AZITHROMYCIN 250 MG/1
TABLET, FILM COATED ORAL
Qty: 6 TABLET | Refills: 0 | Status: SHIPPED | OUTPATIENT
Start: 2022-11-09

## 2022-11-09 RX ORDER — LEVALBUTEROL TARTRATE 45 UG/1
2 AEROSOL, METERED ORAL EVERY 4 HOURS PRN
Qty: 1 EACH | Refills: 11 | Status: SHIPPED | OUTPATIENT
Start: 2022-11-09

## 2022-11-09 RX ORDER — METHYLPREDNISOLONE 4 MG/1
TABLET ORAL
Qty: 1 KIT | Refills: 0 | Status: SHIPPED | OUTPATIENT
Start: 2022-11-09 | End: 2022-11-15

## 2022-11-09 ASSESSMENT — PAIN SCALES - GENERAL: PAINLEVEL_OUTOF10: 0

## 2022-11-09 NOTE — PROGRESS NOTES
Date   Activity/Exercise   Sets/reps/equipment Sets/reps/  equipment Sets/reps/  equipment Sets/reps/  equipment Sets/reps/  equipment Sets/reps/  equipment   Walking with head turns      4 laps 4 laps 4 laps     Walking with head up & down      4 laps 4 laps 4 laps     Step ups      6 inch  2x10 reps 6 inch  2x10 reps 6 inch  2x10 reps     Step taps      6 inch  10 reps  Crossstap 10 reps 6 inch  10 reps  Crossstap 10 reps 6 inch  10 reps  Crosstaps 10 reps     Marching    4 laps 4 laps 4 laps     Sidestepping    4 laps 4 laps 4 laps     Crossovers           [place]           Walking  backwards       4 Laps 4 laps     Tandem walking           Weaving in/out of cones       4 laps 4 laps     Walking in long damon      Working on heel strike, stride length and arm swing. Sports cord             [employer] ball           Figure 8s            Circles right/left           Walking with 360 degree turns           Stepping over half foam    Forward 10 reps bilateral  Lateral  10 reps bilateral Forward 10 reps bilateral  Lateral  10 reps bilateral Forward 10 reps bilateral  Lateral  10 reps bilateral     Weight shifting:    Left & Right             Weight shifting:   Forward & Backward              Static Standing Balance             Standing with feet apart       Blue foams eyes open with head turns and up/down, blue foams eyes closed Blue foams eyes open with head turns and up/down, blue foams eyes closed     Standing with feet together      Eyes closed Eyes closed Eyes closed     Standing with feet semitandem    Eyes open and eyes closed Blue foams Eyes open and eyes closed Blue foams Eyes open and eyes closed     Standing with feet tandem           Single leg stance           X1/X2 Viewing exercises             Hallpike-[place] testing for BPPV (Benign Paroxysmal Positional Vertigo)     Positive R side Negative right       Alfredo-Aneesh exercises           Canalith Repositioning treatment/Epley Maneuver  for BPPV (Benign Paroxysmal Positional Vertigo)   1) R Epley    2) R Carloz        Smart Equitest Training: See scanned report. Pt education Educated patient to avoid bending over/looking up today, and to sleep on a few pillows on back or L side today/tonight;  educated about BPPV and therapy process          THERAPEUTIC EXERCISE: (15 minutes):    Exercises per grid below to improve mobility, strength, and balance. Required minimal verbal cues to promote proper body alignment, promote proper body posture, and promote proper body mechanics. Progressed resistance, repetitions, and complexity of movement as indicated. Date:  11/9/22 Date:   Date:     Activity/Exercise Parameters Parameters Parameters   Sit<>stand X 10 reps from chair with no UE assist     nustep Level 2 x 6 minutes     walking In long damon, [address]ld[address] and [address], and ramps, working on heel strike, stride length and arm swing. Treatment/Session Summary:    Treatment Assessment:  Patient is having no more dizziness or vertgo. Her balance and gait pattern are improving. No LOB but occasional small path deviations. Patient reports that she feels that her strength is slowly improving as well. Communication/Consultation:  None today  Equipment provided today:  none  Recommendations/Intent for next treatment session: Next visit will focus on balance and vestibular training.     Total Treatment Billable Duration:  45 minutes  Time In: 0845  Time Out: 0930    GEMMA Snell PT       Charge Capture  }Post Session Pain  PT Visit Info  [employer] Portal  MD Guidelines  Scanned Media  Benefits  MyChart    Future Appointments   Date Time Provider Uriel Leone   11/9/2022  3:20 PM Deborah Burton MD [facility] AMB   11/10/2022  8:00 AM NELSY [facility] SFE   11/11/2022  9:30 AM HEIDY Bass THOM   11/14/2022  9:30 AM Janeen Thao PT [facility]   11/15/2022 8:00 AM Sofy Boyer, PT [facility]   11/17/2022 10:15 AM Olu Jimenes, PT [facility]   11/21/2022  8:00 AM Sofy Boyer, PT [facility]   11/23/2022 11:00 AM Ryan Hartley, PT [facility]   11/29/2022  8:00 AM Sofy Boyer, PT [facility]   11/30/2022  9:30 AM Sofy Boyer, PT [facility]   12/15/2022  9:00 AM MD SUDHAKAR Muhammad [facility] AMB   12/16/2022 11:20 AM Purnima Kang, APRN - [facility] AMB

## 2022-11-09 NOTE — PROGRESS NOTES
[address] [place] [address]. [address], [address] [facility]  [phone]    Patient Name:  Derick Carranza      YOB: 1970  Office Visit 11/9/2022    ASSESSMENT AND PLAN:  (Medical Decision Making)    Pt with history of asthma, clinically stable. Incidentally found to have B pulmonary nodules somewhat suggestive of mets. No history of cancer. Has psoriatic arthritis which may given a reason for pulmonary nodules. -repeat CT scan in 6 months.   -will try again to refill breo to see if her insurance will cover this. If not, will plan to change to symbicort 80 which seems like it is on her pharmacies formulary.   -cont prn xopenex.   -will rx z pack and medrol dosepack for sinusitis/bronchitis. -pt's HR was elevated at 100 on my check. Will have her monitor and talk to her cardiologist about this if it remains elevated. F/u in 6 months. There are no diagnoses linked to this encounter. Bree Barrera MD    Clinical time for encounter was 25 minutes. _________________________________________________________________________    HISTORY OF PRESENT ILLNESS:    Ms. Tish Mora in our clinic today who presents with a Follow-up (CT results. )  . She has a hsitory of never smoking, asthma, LUCERO on APAP. At her last appointment she she was on Breo 100 and xopenex for asthma. The plan was to obtain PET and MRI brain given some concerning findings on CT scan. PET was done 8/15/22 with no activity in any of the nodules and rec for 6 month follow up. MRI brain was unremarkable. She just had her repeat CT 10/27/22 which showed stability in all of the previously seen nodules. She states she has had some congestion for 2 weeks. In both her head and chest.   No wheeze. No fever. REVIEW OF SYSTEMS:  10 point review of systems is negative except as reported in HPI. PHYSICAL EXAM: Body mass index is 29.6 kg/m².   Vitals:    11/09/22 1525   BP: (Final)    Interpretation Summary  Formatting of this result is different from the original.      Left Ventricle: Normal left ventricular systolic function. EF by 2D Simpsons Biplane is 62%. Left ventricle size is normal. Normal wall thickness. Normal wall motion. Normal diastolic function. Mitral Valve: Mild annular calcification of the mitral valve. Mild regurgitation. Technical qualifiers: Color flow Doppler was performed and pulse wave and/or continuous wave Doppler was performed. Contrast used: Definity. Signed by: Vickie Suarez MD on 7/25/2022 12:27 PM    PMH Reference Info:                                                                                                                    Past Medical History:   Diagnosis Date    Asthma     seasonal    CAD (coronary artery disease)     Depression with anxiety     Diabetes ([address]     checks QD, last A1c 6.5, hyposymptoms at 80 , normal 100    Difficult intubation     during hysterectomy pt states small throat , request glidescope    GERD (gastroesophageal reflux disease)     Hypertension     Nausea & vomiting     Obesity (BMI 30-39.9) 34    Panic attacks     Prediabetes     bs: 140's. diet controlled    Psychiatric disorder     ANXIETY    Thromboembolus ([facility] [address]     2014- ? ? Pulm Embolism after hysterectomy    Unspecified adverse effect of anesthesia     \"my heart stopped and quit breathing after hysterectomy on the way to PACU 11/7/14    Unspecified sleep apnea     uses cpap. Tobacco Use: Low Risk     Smoking Tobacco Use: Never    Smokeless Tobacco Use: Never    Passive Exposure: Not on file     Allergies   Allergen Reactions    Amlodipine      Other reaction(s): Headache-Intolerance    Lisinopril      Other reaction(s): Headache-Intolerance    Olmesartan      Other reaction(s): Nausea and/or vomiting-Intolerance    Paroxetine Hcl      Other reaction(s): Headache-Intolerance    Hydrochlorothiazide      Other reaction(s):  Other- (not listed) - Allergy  Couldn't see after taking, \"wool over eyes\"    Rosuvastatin Other (See Comments)    Eggs Or Egg-Derived Products     Indapamide      Other reaction(s): Unknown-Unspecified    Semaglutide      Other reaction(s): Nausea and/or vomiting-Intolerance    Tramadol Swelling    Butorphanol Palpitations    Cephalexin Rash and Swelling    Penicillins Rash    Ticagrelor Nausea And Vomiting     Current Outpatient Medications   Medication Instructions    aspirin 81 mg, Oral, EVERY EVENING    buPROPion (WELLBUTRIN XL) 150 MG extended release tablet TAKE 1 TABLET BY MOUTH EVERY DAY IN THE MORNING FOR 30 DAYS    cetirizine (ZYRTEC) 10 mg, Oral    chlorthalidone (HYGROTON) 25 mg, Oral, EVERY EVENING    Cholecalciferol 8,000 Units, Oral    clopidogrel (PLAVIX) 75 mg, Oral, DAILY    colchicine (COLCRYS) 0.6 mg, Oral, DAILY    DULoxetine (CYMBALTA) 60 mg, Oral, DAILY    empagliflozin (JARDIANCE) 25 mg, Oral, DAILY    fluticasone (FLONASE) 50 MCG/ACT nasal spray 2 sprays, Nasal, PRN    fluticasone-vilanterol (BREO ELLIPTA) 100-25 MCG/INH AEPB inhaler 1 inhalation every morning, rinse mouth after use    Heparin Sod, Porcine, in D5W (HEPARIN, PORCINE,) 100 UNIT/ML SOLN infusion CONTINUOUS    HUMALOG KWIKPEN 100 UNIT/ML SOPN INJECT 3-8 UNITS UNDER THE SKIN 3 (THREE) TIMES A DAY WITH MEALS    hydrOXYzine HCl (ATARAX) 10 MG tablet TAKE ONE TABLET BY MOUTH AT BEDTIME AS NEEDED FOR SLEEP    Insulin Glargine, 2 Unit Dial, (TOUJEO MAX SOLOSTAR) 300 UNIT/ML SOPN INJECT 10 15 UNITS UNDER THE SKIN DAILY    levalbuterol (XOPENEX HFA) 45 MCG/ACT inhaler Albuterol inhaler or nebulizer 4 times daily as needed.     levalbuterol (XOPENEX) 0.63 MG/3ML nebulization 1 vial via nebulizer q 6 hours prn wheezing and shortness of breath    LORazepam (ATIVAN) 0.5 mg, Oral, EVERY 4 HOURS PRN    losartan (COZAAR) 50 mg, Oral, DAILY    metoprolol succinate (TOPROL XL) 50 mg, Oral, DAILY    Mucinex Maximum Strength 1,200 mg, Oral, PRN    niacin 500 mg, Oral, DAILY BEFORE BREAKFAST    nitroGLYCERIN (NITROSTAT) 0.4 mg, SubLINGual    ondansetron (ZOFRAN-ODT) 4 mg, Oral, 3 TIMES DAILY PRN    potassium chloride (KLOR-CON M) 20 MEQ extended release tablet 20 mEq, Oral, DAILY    ranolazine (RANEXA) 500 mg, Oral, 2 TIMES DAILY    Repatha SureClick 378 mg, SubCUTAneous, EVERY 14 DAYS    Trulicity 6.30 mg, SubCUTAneous, Q 10-12 days

## 2022-11-09 NOTE — THERAPY EVALUATION
Estuardo Ramirez  : 1970  Primary: Nika Hernandez  Secondary:  Jorje Yuan  [facility] [address]  [address] [place] [address] Way [address]  Phone: [phone]  Fax: [phone]    Visit Info:    Effective Dates  11/10/2022 TO 2023   Frequency/Duration   2 times a week for 90 days    SPEECH LANGUAGE PATHOLOGY: COMMUNICATION    Initial Assessment 11/10/2022     Appt Desk   Episode        Treatment Diagnosis: R41.841 Cognitive-Communication Deficit  Medical/Referring Diagnosis:  No admission diagnoses are documented for this encounter. Referring Physician:  Majo Puentes NP MD Orders:  Ko Cline and Treat   Return MD Appt:  No current f/u scheduled  Date of Onset:  Progressive since   Allergies:  Amlodipine, Lisinopril, Olmesartan, Paroxetine hcl, Hydrochlorothiazide, Rosuvastatin, Eggs or egg-derived products, Indapamide, Semaglutide, Tramadol, Butorphanol, Cephalexin, Penicillins, and Ticagrelor  Medications Last Reviewed:  11/10/2022     SUBJECTIVE    History of Injury/Illness (Reason for Referral):  Patient with primary c/o memory loss, cognitive changes, including difficulty with multi-tasking. Patient being seen at this clinic for dizziness, vestibular changes. Patient reports having her 4th heart attack  and feels she has not bounced back as much as she previously has. She states she has not been able to return to work. Reports hx of long haul COVID in 2020 and has noticed changes progressively since that time. Patient Stated Goal(s):  Improve attention and memory recall.      Past Medical History/Comorbidities:   Ms. Lorenza Theodore  has a past medical history of Asthma, CAD (coronary artery disease), Depression with anxiety, Diabetes ([address] Difficult intubation, GERD (gastroesophageal reflux disease), Hypertension, Nausea & vomiting, Obesity (BMI 30-39.9), Panic attacks, Prediabetes, Psychiatric disorder, Thromboembolus ([facility] [address] Unspecified adverse effect of Solving  Problem Solving: Exceptions to [facility]  Sequencing: Mild  Managing Finances:  (Mild to moderate)  Managing Medications: Mild    ASSESSMENT    Initial Assessment:     Patient presents with mild to moderately impaired cognitive-communicative skills. Patient exhibits deficits in areas of sustained, selective, and divided attention, immediate and delayed memory recall, and functional calculations. Visuospatial skills remain intact. Expressive language is functional. Patient currently requires assistance from spouse/family members for higher level ADLs in home/community setting. Speech therapy services are required to address aforementioned difficulties related to cognitive-linguistic skills, as patient is currently below previous level of function. Problem List: (Impacting functional limitations):    Cognitive-communicative skills  Memory recall  Attention  Therapy Prognosis:   Good  RECOMMENDATIONS   Recommendations:    Yes. Speech therapy services are clinically indicated at this time to address cognitive-communicative skills    PLAN    Interventions Planned (Treatment may consist of any combination of the following):    Cognitive linguistic based treatment, Training in compensatory strategies, and Education  Goals: (Goals have been discussed and agreed upon with patient.)  Short-Term Functional Goals: Time Frame: 12 weeks  Patient will perform multiple step sequencing planning tasks based in daily activities with 80% accuracy with min verbal cues. Patient will perform prospective memory tasks after a 15-30 minute interval using external aid (example, clock). Patient will recall relevant verbal information with >80% accuracy with min cues. Patient will plan and prioritize daily tasks with 90% accuracy given no cueing. Patient will complete functional short-term memory tasks with 85% accuracy with minimal assistance.    Patient will complete functional calculations with 85% accuracy with minimal assistance. Patient will utilize memory compensatory strategies to improve recall of functional information with 80% accuracy given min assistance. Discharge Goals: Time Frame: 12 weeks  Patient will improve neuro-linguistic abilities to participate in a functional living environment       Outcome Measure:   ATTENTION: Level 4: The individual maintains attention during simple living tasks of multiple steps and long duration within a minimally distracting environment with consistent minimal cueing. MEMORY: Level 5:  The individual consistently requires minimal cues to recall or use external memory aids for complex and novel information. The individual consistently requires minimal cues to plan and follow through on complex future events (e.g., menu planning and meal preparation, planning a party, etc.). Initial: (Date: 11/10/2022)    Medical Necessity/Other Comments:   Patient is expected to demonstrate progress in cognitive ability to increase independence with activities of daily living. Skilled intervention continues to be required due to decreased cognitive skills. Total Duration:  Time In: 0800  Time Out: 1770  Minutes: 48    Regarding Kyotilia Luu's therapy, I certify that the treatment plan above will be carried out by a therapist or under their direction.   Thank you for this referral,  Eloina Villagomez, SLP     Referring Physician Signature: Lieutenant Kimi NP _______________________________ Date _____________

## 2022-11-10 ENCOUNTER — HOSPITAL ENCOUNTER (OUTPATIENT)
Dept: PHYSICAL THERAPY | Age: 52
Setting detail: RECURRING SERIES
Discharge: HOME | End: 2022-11-13
Payer: COMMERCIAL

## 2022-11-10 PROCEDURE — 92523 SPEECH SOUND LANG COMPREHEN: CPT

## 2022-11-10 NOTE — PROGRESS NOTES
Derick Maillard  : 1970  Primary: Nika Hernandez  Secondary:  Joyce Pinzon 81  [address] [place] [address] Way [address]  Phone: [phone]  Fax: [phone] No data recorded  No data recorded    SLP VISIT INFO  Effective Dates:   11/10/2022 TO 2023   Frequency/Duration   2 times a week for 90 days    OUTPATIENT SPEECH PATHOLOGY NOTE:Daily Note 2022  Appt Desk   Episode      Treatment Diagnosis: R41.841 Cognitive-Communication Deficit  Medical/Referring Diagnosis:  No admission diagnoses are documented for this encounter. Referring Physician:  Mich Dowd NP MD Orders:  Herber Sauer and Treat   Allergies:  Amlodipine, Lisinopril, Olmesartan, Paroxetine hcl, Hydrochlorothiazide, Rosuvastatin, Eggs or egg-derived products, Indapamide, Semaglutide, Tramadol, Butorphanol, Cephalexin, Penicillins, and Ticagrelor  Medications Last Reviewed:  2022  Subjective Comments:  Patient doing well today. Pain:  Patient does not c/o pain. Interventions Planned: (Treatment may consist of any combination of the following)    GOALS: (Goals have been discussed and agreed upon with patient.)  Short-Term Functional Goals: Time Frame: 12 weeks  Patient will perform multiple step sequencing planning tasks based in daily activities with 80% accuracy with min verbal cues. Patient will perform prospective memory tasks after a 15-30 minute interval using external aid (example, clock). Patient will recall relevant verbal information with >80% accuracy with min cues. Patient will plan and prioritize daily tasks with 90% accuracy given no cueing. Patient will complete functional short-term memory tasks with 85% accuracy with minimal assistance. Patient will complete functional calculations with 85% accuracy with minimal assistance.   Patient will utilize memory compensatory strategies to improve recall of functional information with 80% accuracy given min assistance. Discharge Goals: Time Frame: 12 weeks  Patient will improve neuro-linguistic abilities to participate in a functional living environment     Updated Objective Findings:  None Today  Treatment   Cognitive-communicative activities:    Spent majority of session addressing compensatory strategies to assist in completing cognitive tasks with selective attention, memory recall, and sequencing tasks of daily living. Provided visual handout with extensive list of aids/strategies to implement to aid in processing, memory, and attention: writing things down, rehearsal, chunking information    Patient continues to use external timer to sustain attention with task, starting with short increments and slowly increasing  Keeping list on phone/written list to check behind tasks (ex: turn oven on, put food in, turn on timer, turn off oven)  Verbal rehearsal to ensure completing tasks (ex: \"I turned the oven off\"), verbal restating in conversations to sustain attention and maintain topic    Treatment/Session Summary:  Encouraged patient to bring phone to next session to assist with external aids for memory to create to do lists, reminders, appointments, etc.  Communication/Consultation:  None today  Recommendations/Intent for next treatment session: Next visit will focus on cognitive-communicative skills.     Total Duration:  Time In: 1515  Time Out: 0511  Minutes: [facility], [facility]    Visit # Therapist initials Date Arrived NS/ Cx < 24 hr >24 hr Cx Visit Comments   1 BC 11/10 X   Initial Assessment Only Today   2 BC 11/11 X                                                                                                                                                        Abbreviations: NS = No Show; CX = cancelled

## 2022-11-11 ENCOUNTER — HOSPITAL ENCOUNTER (OUTPATIENT)
Dept: PHYSICAL THERAPY | Age: 52
Setting detail: RECURRING SERIES
Discharge: HOME | End: 2022-11-14
Payer: COMMERCIAL

## 2022-11-11 PROCEDURE — 97130 THER IVNTJ EA ADDL 15 MIN: CPT

## 2022-11-11 PROCEDURE — 97112 NEUROMUSCULAR REEDUCATION: CPT

## 2022-11-11 PROCEDURE — 97110 THERAPEUTIC EXERCISES: CPT

## 2022-11-11 PROCEDURE — 97129 THER IVNTJ 1ST 15 MIN: CPT

## 2022-11-11 ASSESSMENT — PAIN DESCRIPTION - ORIENTATION: ORIENTATION: LEFT

## 2022-11-11 ASSESSMENT — PAIN DESCRIPTION - LOCATION: LOCATION: FOOT

## 2022-11-11 ASSESSMENT — PAIN SCALES - GENERAL: PAINLEVEL_OUTOF10: 3

## 2022-11-11 NOTE — PROGRESS NOTES
Derick Carranza  : 1970  Primary: [address]  Secondary:  Cloyce Gun  [address] [place] [address]  Phone: [phone]  Fax: [phone] Plan Frequency: 1-2 times per week for 8-12 weeks  Plan of Care/Certification Expiration Date: 23      PT Visit Info: Total # of Visits to Date: 5  Progress Note Counter: 5  Progress Note Due Date: 22     Visit Count:  5   OUTPATIENT PHYSICAL THERAPY:OP NOTE TYPE: Treatment Note 2022       Episode  }Appt Desk             Treatment Diagnosis:  Other abnormalities of gait and mobility (R26.89)  Dizziness and Giddiness (R42)  Medical/Referring Diagnosis:  No admission diagnoses are documented for this encounter. R42 (ICD-10-CM) - Dizziness and giddiness  Referring Physician:  Mich Dowd NP MD Orders:  PT Eval and Treat   Date of Onset:  No data recorded   Allergies:   Amlodipine, Lisinopril, Olmesartan, Paroxetine hcl, Hydrochlorothiazide, Rosuvastatin, Eggs or egg-derived products, Indapamide, Semaglutide, Tramadol, Butorphanol, Cephalexin, Penicillins, and Ticagrelor  Restrictions/Precautions:  No data recorded  No data recorded   Interventions Planned (Treatment may consist of any combination of the following):    Current Treatment Recommendations: Strengthening; Balance training; Gait training; Neuromuscular re-education; Home exercise program; Vestibular rehab     Subjective Comments:   \"I am feeling stiff. I have a bone spur in my left heel\". Initial:}Left Foot 3/10Post Session:  Left  Foot 3/10  Medications Last Reviewed:  2022  Updated Objective Findings:  None Today  Treatment   NEUROMUSCULAR RE-EDUCATION: (30 minutes):    Exercise/activities per grid below to improve balance, coordination, kinesthetic sense, posture, and proprioception. Required minimal verbal cues to promote static and dynamic balance in standing.   Activity   Date  10/28/22 Date  22 Date  22 Date  11/9/22 Date  11/11/22 Date   Activity/Exercise   Sets/reps/equipment Sets/reps/  equipment Sets/reps/  equipment Sets/reps/  equipment Sets/reps/  equipment Sets/reps/  equipment   Walking with head turns      4 laps 4 laps 4 laps 4 laps    Walking with head up & down      4 laps 4 laps 4 laps 4 laps    Step ups      6 inch  2x10 reps 6 inch  2x10 reps 6 inch  2x10 reps 6 inch  2x10 reps    Step taps      6 inch  10 reps  Crossstap 10 reps 6 inch  10 reps  Crossstap 10 reps 6 inch  10 reps  Crosstaps 10 reps 6 inch  10 reps  Crosstaps 10 reps    Marching    4 laps 4 laps 4 laps 4 laps    Sidestepping    4 laps 4 laps 4 laps 4 laps    Crossovers           Lithia           Walking  backwards       4 Laps 4 laps 4 laps    Tandem walking           Weaving in/out of cones       4 laps 4 laps 4 laps    Walking in long damon      Working on heel strike, stride length and arm swing. Sports cord             [employer] ball           Figure 8s            Circles right/left           Walking with 360 degree turns           Stepping over half foam    Forward 10 reps bilateral  Lateral  10 reps bilateral Forward 10 reps bilateral  Lateral  10 reps bilateral Forward 10 reps bilateral  Lateral  10 reps bilateral Forward 10 reps bilateral  Lateral  10 reps bilateral    Weight shifting:    Left & Right             Weight shifting:   Forward & Backward              Static Standing Balance             Standing with feet apart       Blue foams eyes open with head turns and up/down, blue foams eyes closed Blue foams eyes open with head turns and up/down, blue foams eyes closed Blue foams eyes open with head turns and up/down, blue foams eyes closed    Standing with feet together      Eyes closed Eyes closed Eyes closed     Standing with feet semitandem    Eyes open and eyes closed Blue foams Eyes open and eyes closed Blue foams Eyes open and eyes closed Blue foams Eyes open and eyes closed    Standing with feet tandem           Single leg stance           X1/X2 Viewing exercises             Hallpike-Barbra testing for BPPV (Benign Paroxysmal Positional Vertigo)     Positive R side Negative right       Fuentes-Daroff exercises           Canalith Repositioning treatment/Epley Maneuver  for BPPV (Benign Paroxysmal Positional Vertigo)   1) R Epley    2) R Appiani        Smart Equitest Training: See scanned report. Pt education Educated patient to avoid bending over/looking up today, and to sleep on a few pillows on back or L side today/tonight;  educated about BPPV and therapy process          THERAPEUTIC EXERCISE: (15 minutes):    Exercises per grid below to improve mobility, strength, and balance. Required minimal verbal cues to promote proper body alignment, promote proper body posture, and promote proper body mechanics. Progressed resistance, repetitions, and complexity of movement as indicated. Date:  11/9/22 Date:  11/11/22 Date:     Activity/Exercise Parameters Parameters Parameters   Sit<>stand X 10 reps from chair with no UE assist X 10 reps from chair with no UE assist    nustep Level 2 x 6 minutes Level 3 x 6 minutes    walking In long damon, [address] and [address], and ramps, working on heel strike, stride length and arm swing. In long damon, bld[address] and [address], and ramps, working on heel strike, stride length and arm swing. Treatment/Session Summary:    Treatment Assessment:  Patient tolerated treatment without complaints. Patient progressing well with therapy. Communication/Consultation:  None today  Equipment provided today:  none  Recommendations/Intent for next treatment session: Next visit will focus on balance and vestibular training.     Total Treatment Billable Duration:  45 minutes  Time In: 4532  Time Out: 1430    JUNITO RENTERIA, PT       Charge Capture  }Post Session Pain  PT Visit Info  [employer]  MD Guidelines  Scanned Media  Benefits Andrew    Future Appointments   Date Time Provider Uriel Leone   11/14/2022  9:30 AM Dimitrios Horns, PT [facility]   11/15/2022  8:00 AM Dimitrios Hernandezs, PT [facility]   11/15/2022 10:15 AM Fidel Hanley, SLP [facility]   11/17/2022 10:15 AM Alvera Picking Vissage, PT [facility]   11/21/2022  8:00 AM Dimitrios Horns, PT [facility]   11/23/2022 11:00 AM Hareliajh Ascencio, PT [facility]   11/29/2022  8:00 AM Dimitrios Horns, PT [facility]   11/30/2022  9:30 AM Dimitrios Horns, PT [facility]   12/1/2022  8:00 AM Fidel Hanley, SLP [facility]   12/1/2022  8:45 AM Dimitrios Horns, PT [facility]   12/6/2022  8:00 AM Fidel Hanley, SLP [facility]   12/6/2022  8:45 AM Alvera Picking Vissage, PT [facility]   12/8/2022  8:00 AM Fidel Hanley, SLP [facility]   12/8/2022  8:45 AM Alvera Picking Vissage, PT [facility]   12/13/2022  8:00 AM Fidel Hanley, SLP [facility]   12/13/2022  8:45 AM Alvera Picking Vissage, PT [facility]   12/15/2022  9:00 AM MD SUDHAKAR Sebastian [facility]   12/15/2022  2:30 PM Sacha Ascencio, PT [facility]   12/15/2022  3:15 PM NELSY Nicole [facility]   12/16/2022 11:20 AM Smitha Kang, APRN - CNP [facility]CD [facility]   12/20/2022  8:00 AM Fidel Hanley, SLP [facility]   12/20/2022  8:45 AM HEIDY Vazquez   12/22/2022  8:00 AM NELSY Nicole [facility]   12/22/2022  8:45 AM HEIDY Vazquez THOM   5/17/2023  8:40 AM Rod Patiño MD PPS [facility]

## 2022-11-14 ENCOUNTER — HOSPITAL ENCOUNTER (OUTPATIENT)
Dept: PHYSICAL THERAPY | Age: 52
Setting detail: RECURRING SERIES
End: 2022-11-14
Payer: COMMERCIAL

## 2022-11-14 NOTE — PROGRESS NOTES
Gerhard Martinez  : 1970  Primary: [address]  Secondary:  Lilibeth Silva  [address] [facility] [address]  Phone: [phone]  Fax: [phone]    PT Visit Info: Total # of Visits to Date: 5  Progress Note Counter: 5  Progress Note Due Date: 22     OT Visit Info:  No data recorded    OUTPATIENT THERAPY:OP NOTE TYPE: Progress Report 2022               Episode  Appt Desk           Gerhard Martinez cancelled her appointment for today due to unknown reasons. Will plan to follow up next during next appointment.   Thank you,  Oral , PT    Future Appointments   Date Time Provider Uriel Leone   11/15/2022  8:00 AM Demar Roth, PT [facility]   11/15/2022 10:15 AM Sheila Wade, SLP [facility]   2022 10:15 AM [place] Spencer Vissage, PT [facility]   2022  8:00 AM Demar Roth, PT [facility]   2022 11:00 AM Tuan Ma, PT [facility]   2022  8:00 AM Demar Roth, PT [facility]   2022  9:30 AM Demar Roth, PT [facility]   2022  8:00 AM Sheila Wade, SLP [facility]   2022  8:45 AM Demar Roth, PT [facility]   2022  8:00 AM Sheila Wade, SLP [facility]   2022  8:45 AM [place] Spencer Vissage, PT [facility]   2022  8:00 AM Sheila Wade, SLP [facility]   2022  8:45 AM [place] Spencer Vissage, PT [facility]   2022  8:00 AM Sheila Wade, SLP [facility]   2022  8:45 AM Stephanie Spencer Vissage, PT [facility]   12/15/2022  9:00 AM Yadiel Mendoza MD [facility]   12/15/2022  2:30 PM Tuan Ma, PT [facility]   12/15/2022  3:15 PM NELSY [facility]   2022 11:20 AM Lawrence Kang, APRN - CNP PSCD [facility]   2022  8:00 AM NELSY [facility] THOM   2022  8:45 AM Amilcar Cabrales, HEIDY RAYA E   2022  8:00 AM NELSY [facility]   2022 8:45 AM Rudy Patel PT [facility]   5/17/2023  8:40 AM Melvina Alonzo MD PPS [facility]

## 2022-11-14 NOTE — PROGRESS NOTES
Estuardo Labs  : 1970  Primary: Nika Hernandez  Secondary:  Jorje Yuan  [facility] [address]  [address] [place] [address] Way [address]  Phone: [phone]  Fax: [phone] No data recorded  No data recorded    SLP VISIT INFO  Effective Dates:   11/10/2022 TO 2023   Frequency/Duration   2 times a week for 90 days    OUTPATIENT SPEECH PATHOLOGY NOTE:Daily Note 11/15/2022  Appt Desk   Episode      Treatment Diagnosis: R41.841 Cognitive-Communication Deficit  Medical/Referring Diagnosis:  No admission diagnoses are documented for this encounter. Referring Physician:  Majo Puentes NP MD Orders:  Ko Speed and Treat   Allergies:  Amlodipine, Lisinopril, Olmesartan, Paroxetine hcl, Hydrochlorothiazide, Rosuvastatin, Eggs or egg-derived products, Indapamide, Semaglutide, Tramadol, Butorphanol, Cephalexin, Penicillins, and Ticagrelor  Medications Last Reviewed:  11/15/2022  Subjective Comments:  Patient doing well today. States she had a headache yesterday. Pain:  Patient does not c/o pain. Interventions Planned: (Treatment may consist of any combination of the following)    GOALS: (Goals have been discussed and agreed upon with patient.)  Short-Term Functional Goals: Time Frame: 12 weeks  Patient will perform multiple step sequencing planning tasks based in daily activities with 80% accuracy with min verbal cues. Patient will perform prospective memory tasks after a 15-30 minute interval using external aid (example, clock). Patient will recall relevant verbal information with >80% accuracy with min cues. Patient will plan and prioritize daily tasks with 90% accuracy given no cueing. Patient will complete functional short-term memory tasks with 85% accuracy with minimal assistance. Patient will complete functional calculations with 85% accuracy with minimal assistance.   Patient will utilize memory compensatory strategies to improve recall of functional information with 80% accuracy given min assistance. Discharge Goals: Time Frame: 12 weeks  Patient will improve neuro-linguistic abilities to participate in a functional living environment     Updated Objective Findings:  None Today  Treatment   Cognitive-communicative activities:    Spent majority of session addressing compensatory strategies to assist in completing cognitive tasks with selective attention, memory recall, and sequencing tasks of daily living. Provided visual handout with extensive list of aids/strategies to implement to aid in processing, memory, and attention: writing things down, rehearsal, chunking information    Patient continues to use external timer to sustain attention with task, starting with short increments and slowly increasing  Input dated/timed reminders for upcoming appointments with 80% accuracy with mod cueing  Strategies discussed to implement restating comments heard to aid in memory for conversation - patient utilized in session 2x  Strategy of writing things down/list tracking on phone (groceries, to do list, etc.) patient initiated 1 list today  Recalled recent events of last 48 hours with 90% accuracy  Verbal rehearsal to ensure completing tasks (ex: \"I turned the oven off\"), verbal restating in conversations to sustain attention and maintain topic    Treatment/Session Summary:  Patient states external aids will be helpful for reminding her of upcoming appointments, events. States she finds herself forgetting what she is talking about when in conversation with others. Communication/Consultation:  None today  Recommendations/Intent for next treatment session: Next visit will focus on cognitive-communicative skills.     Total Duration:  Time In: 1015  Time Out: 7752  Minutes: Pedro Faith, SLP    Visit # Therapist initials Date Arrived NS/ Cx < 24 hr >24 hr Cx Visit Comments   1 BC 11/10 X   Initial Assessment Only Today   2 BC 11/11 X      3 BC 11/15 X       BC 11/22   X No schedule availability    [facility] 11/24   X Clinic holiday closed                                                                                                                          Abbreviations: NS = No Show; CX = cancelled

## 2022-11-15 ENCOUNTER — HOSPITAL ENCOUNTER (OUTPATIENT)
Dept: PHYSICAL THERAPY | Age: 52
Setting detail: RECURRING SERIES
Discharge: HOME | End: 2022-11-18
Payer: COMMERCIAL

## 2022-11-15 ENCOUNTER — HOSPITAL ENCOUNTER (OUTPATIENT)
Dept: PHYSICAL THERAPY | Age: 52
Setting detail: RECURRING SERIES
End: 2022-11-15
Payer: COMMERCIAL

## 2022-11-15 PROCEDURE — 97129 THER IVNTJ 1ST 15 MIN: CPT

## 2022-11-15 PROCEDURE — 97130 THER IVNTJ EA ADDL 15 MIN: CPT

## 2022-11-15 NOTE — PROGRESS NOTES
Brendan Rico  : 1970  Primary: Nika Nowaki  Secondary:  Roselia [place]  [address] [facility] [address]  Phone: [phone]  Fax: [phone]    PT Visit Info: Total # of Visits to Date: 5  Progress Note Counter: 5  Progress Note Due Date: 22  Canceled Appointment: 2 (11/15/22)     OT Visit Info:  No data recorded    OUTPATIENT THERAPY:OP NOTE TYPE: Progress Report 11/15/2022               Episode  Appt Desk           Brendan Rico cancelled her appointment for today due to  overslept . Will plan to follow up next during next appointment.   Thank you,  Suman Renteria, PT    Future Appointments   Date Time Provider Uriel Leone   11/15/2022 10:15 AM Brionna, SLP [facility] SFE   2022 10:15 AM Antonia Napier, PT [facility] SFE   2022  8:00 AM Le Flight, PT [facility]PT SFE   2022 11:00 AM Larryeulalia Monroy, PT S[facility]PT SFE   2022  8:00 AM Le Flight, PT [facility] SFE   2022  9:30 AM Le Flight, PT S[facility]PT SFE   2022  8:00 AM Brionna, SLP [facility] SFE   2022  8:45 AM Le Flight, PT [facility] SFE   2022  8:00 AM Brionna, SLP S[facility]PT SFE   2022  8:45 AM Antoniatristen Napier, PT SFEORPT SFE   2022  8:00 AM Brionna, SLP [facility] SFE   2022  8:45 AM Antoniatristen Napier, PT SFE[facility]PT SFE   2022  8:00 AM Brionna, SLP SFE[facility]PT SFE   2022  8:45 AM Antoniatristen Napier, PT SFEORPT SFE   12/15/2022  9:00 AM Lexus Rogers MD UCDE [facility] AMB   12/15/2022  2:30 PM Larry Monroy, PT JELLYESUKI ALEX   12/15/2022  3:15 PM NELSY Staton [facility]   2022 11:20 AM Avi Kang, APRN - CNP PSCD [facility] AMB   2022  8:00 AM NELSY Staton   2022  8:45 AM Susan Cavazos, PT DOROTA ALEX   2022  8:00 AM NELSY Staton [facility]   2022  8:45 AM Susan Cavazos, PT [facility]PT [facility]   5/17/2023  8:40 AM Grace Zelaya MD PPS [facility] AMB

## 2022-11-17 ENCOUNTER — HOSPITAL ENCOUNTER (OUTPATIENT)
Dept: PHYSICAL THERAPY | Age: 52
Setting detail: RECURRING SERIES
Discharge: HOME | End: 2022-11-20
Payer: COMMERCIAL

## 2022-11-17 PROCEDURE — 97110 THERAPEUTIC EXERCISES: CPT

## 2022-11-17 PROCEDURE — 97112 NEUROMUSCULAR REEDUCATION: CPT

## 2022-11-17 ASSESSMENT — PAIN SCALES - GENERAL: PAINLEVEL_OUTOF10: 0

## 2022-11-17 NOTE — PROGRESS NOTES
Brendan Rico  : 1970  Primary: [address]  Secondary:  Roselia [place]  [address] [place] [address]  Phone: [phone]  Fax: [phone] Plan Frequency: 1-2 times per week for 8-12 weeks  Plan of Care/Certification Expiration Date: 23      PT Visit Info: Total # of Visits to Date: 6  Progress Note Counter: 6  Progress Note Due Date: 22  Canceled Appointment: 2 (11/15/22)     Visit Count:  6   OUTPATIENT PHYSICAL THERAPY:OP NOTE TYPE: Treatment Note 2022       Episode  }Appt Desk             Treatment Diagnosis:  Other abnormalities of gait and mobility (R26.89)  Dizziness and Giddiness (R42)  Medical/Referring Diagnosis:  No admission diagnoses are documented for this encounter. R42 (ICD-10-CM) - Dizziness and giddiness  Referring Physician:  Liss East NP MD Orders:  PT Eval and Treat   Date of Onset:  No data recorded   Allergies:   Amlodipine, Lisinopril, Olmesartan, Paroxetine hcl, Hydrochlorothiazide, Rosuvastatin, Eggs or egg-derived products, Indapamide, Semaglutide, Tramadol, Butorphanol, Cephalexin, Penicillins, and Ticagrelor  Restrictions/Precautions:  No data recorded  No data recorded   Interventions Planned (Treatment may consist of any combination of the following):    Current Treatment Recommendations: Strengthening; Balance training; Gait training; Neuromuscular re-education; Home exercise program; Vestibular rehab     Subjective Comments:   \"Sorry I had to cancel last visit but I had a really bad headache. I am doing well today\". Initial:}    010Post Session:       010  Medications Last Reviewed:  2022  Updated Objective Findings:  None Today  Treatment   NEUROMUSCULAR RE-EDUCATION: (30 minutes):    Exercise/activities per grid below to improve balance, coordination, kinesthetic sense, posture, and proprioception.   Required minimal verbal cues to promote static and dynamic balance in standing. Activity   Date  10/28/22 Date  11/1/22 Date  11/4/22 Date  11/9/22 Date  11/11/22 Date  11/17/22   Activity/Exercise   Sets/reps/equipment Sets/reps/  equipment Sets/reps/  equipment Sets/reps/  equipment Sets/reps/  equipment Sets/reps/  equipment   Walking with head turns      4 laps 4 laps 4 laps 4 laps 4 laps   Walking with head up & down      4 laps 4 laps 4 laps 4 laps 4 laps   Step ups      6 inch  2x10 reps 6 inch  2x10 reps 6 inch  2x10 reps 6 inch  2x10 reps    Step taps      6 inch  10 reps  Crossstap 10 reps 6 inch  10 reps  Crossstap 10 reps 6 inch  10 reps  Crosstaps 10 reps 6 inch  10 reps  Crosstaps 10 reps 6 inch  10 reps  Crosstaps 10 reps   Marching    4 laps 4 laps 4 laps 4 laps 4 laps   Sidestepping    4 laps 4 laps 4 laps 4 laps 4 laps   Walking diagonals      4 laps   [place]           Walking  backwards       4 Laps 4 laps 4 laps 4 laps   Tandem walking        4 laps   Weaving in/out of cones       4 laps 4 laps 4 laps 4 laps   Walking in long damon      Working on heel strike, stride length and arm swing. Sports cord             [employer] ball           Figure 8s            Circles right/left           Walking with 360 degree turns           Stepping over half foam    Forward 10 reps bilateral  Lateral  10 reps bilateral Forward 10 reps bilateral  Lateral  10 reps bilateral Forward 10 reps bilateral  Lateral  10 reps bilateral Forward 10 reps bilateral  Lateral  10 reps bilateral Forward 10 reps bilateral  Lateral  10 reps bilateral   Weight shifting:    Left & Right             Weight shifting:   Forward & Backward              Static Standing Balance             Standing with feet apart       Blue foams eyes open with head turns and up/down, blue foams eyes closed Blue foams eyes open with head turns and up/down, blue foams eyes closed Blue foams eyes open with head turns and up/down, blue foams eyes closed [place] eyes open and eyes closed   Standing with feet together      Eyes closed Eyes closed Eyes closed     Standing with feet semitandem    Eyes open and eyes closed Blue foams Eyes open and eyes closed Blue foams Eyes open and eyes closed Blue foams Eyes open and eyes closed    Standing with feet tandem           Single leg stance           X1/X2 Viewing exercises             Hallpike-Spooner testing for BPPV (Benign Paroxysmal Positional Vertigo)     Positive R side Negative right       Fuentes-Daroff exercises           Canalith Repositioning treatment/Epley Maneuver  for BPPV (Benign Paroxysmal Positional Vertigo)   1) R Epley    2) R Appiani        Smart Equitest Training: See scanned report. Pt education Educated patient to avoid bending over/looking up today, and to sleep on a few pillows on back or L side today/tonight;  educated about BPPV and therapy process          THERAPEUTIC EXERCISE: (15 minutes):    Exercises per grid below to improve mobility, strength, and balance. Required minimal verbal cues to promote proper body alignment, promote proper body posture, and promote proper body mechanics. Progressed resistance, repetitions, and complexity of movement as indicated. Date:  11/9/22 Date:  11/11/22 Date:  11/17/22   Activity/Exercise Parameters Parameters Parameters   Sit<>stand X 10 reps from chair with no UE assist X 10 reps from chair with no UE assist X 10 reps from chair with no UE assist   nustep Level 2 x 6 minutes Level 3 x 6 minutes Level 3 x 7 minutes   walking In long damon, bldg 131 and 135, and ramps, working on heel strike, stride length and arm swing. In long damon, bldg 131 and 135, and ramps, working on heel strike, stride length and arm swing. In long damon, bldg 131 and 135, and ramps, working on heel strike, stride length and arm swing.                                     Treatment/Session Summary:    Treatment Assessment:  Patient demonstrates improved balance and improved mobility wtih exercises. Communication/Consultation:  None today  Equipment provided today:  none  Recommendations/Intent for next treatment session: Next visit will focus on balance and vestibular training.     Total Treatment Billable Duration:  45 minutes  Time In: 0895  Time Out: 4669    JUNITO Arnett, PT       Charge Capture  }Post Session Pain  PT Visit Info  MedBridge Portal  MD Guidelines  Scanned Media  Benefits  MyChart    Future Appointments   Date Time Provider Uriel Sulema   11/21/2022  8:00 AM Corie Pill, PT [facility] SFE   11/23/2022 11:00 AM Alisha Chol, PT S[facility]ORPT SFE   11/29/2022  8:00 AM Corie Pill, PT [facility]PT SFE   11/30/2022  9:30 AM Corie Pill, PT [facility]PT SFE   12/1/2022  8:00 AM Ramonia Mcclellan, SLP [facility]PT SFE   12/1/2022  8:45 AM Corie Pill, PT SFEORPT SFE   12/6/2022  8:00 AM Ramonia Mcclellan, SLP S[facility]ORPT SFE   12/6/2022  8:45 AM Yani Janie Vissage, PT SFEORPT SFE   12/8/2022  8:00 AM Ramonia Mcclellan, SLP SFEORPT SFE   12/8/2022  8:45 AM Yani [place] Vissage, PT SFEORPT SFE   12/13/2022  8:00 AM Ramonia Mcclellan, SLP SFEORPT SFE   12/13/2022  8:45 AM Yani [place] Vissage, PT SFEORPT SFE   12/15/2022  9:00 AM MD SUDHAKAR Mosqueda [facility] AMB   12/15/2022  2:30 PM Alisha Chol, PT SFEORPT SFE   12/15/2022  3:15 PM Ramonia Mcclellan, SLP SFEORPT SFE   12/16/2022 11:20 AM YANI Nolasco - CNP [facility] G[facility] AMB   12/20/2022  8:00 AM Ramonia Mcclellan, SLP [facility]ORPT SFE   12/20/2022  8:45 AM Yani Janie Vissage, PT SFEORPT SFE   12/22/2022  8:00 AM NELSY Lozada SFEORPT E   12/22/2022  8:45 AM Danielle Parks PT SFEORPLAURY E   5/17/2023  8:40 AM Lena Frazier MD PPS G[facility] AMB

## 2022-11-21 ENCOUNTER — HOSPITAL ENCOUNTER (OUTPATIENT)
Dept: PHYSICAL THERAPY | Age: 52
Setting detail: RECURRING SERIES
End: 2022-11-21
Payer: COMMERCIAL

## 2022-11-21 NOTE — PROGRESS NOTES
Zonia Gregorio  : 1970  Primary: Nika Hernandez  Secondary:  Kristeen Curling  [address] [facility] [address]  Phone: [phone]  Fax: [phone]    PT Visit Info: Total # of Visits to Date: 6  Progress Note Counter: 6  Progress Note Due Date: 22  Canceled Appointment: 3 (22)     OT Visit Info:  No data recorded    OUTPATIENT THERAPY:OP NOTE TYPE: Progress Report 2022               Episode  Appt Desk           Zonia Gregorio cancelled her appointment for today due to  exposed to [place] . Will plan to follow up next during next appointment.   Thank you,  Herbert Chen, PT    Future Appointments   Date Time Provider Uriel Leone   2022  8:00 AM NELSY Omalley [facility]   2022  8:45 AM Twyla Slater, PT SFEORPT SFE   2022  8:00 AM NELSY Omalley [facility] SFE   2022  8:45 AM Nikkie Napier, PT SFEORPT SFE   2022  8:00 AM NELSY Omalley [facility] SFE   2022  8:45 AM Nikkie Napier, PT SFEORPT SFE   2022  8:00 AM NELSY Omalley [facility] SFE   2022  8:45 AM Nikkie Napier, PT SFEORPT SFE   12/15/2022  9:00 AM MD SUDHAKAR Hurtado [facility] AMB   12/15/2022  2:30 PM Grace Valencia, PT SFEORPT SFE   12/15/2022  3:15 PM NELSY Omalley [facility] SFE   2022 11:20 AM Syl Kang APRN - CNP PSCD G[facility] AMB   2022  8:00 AM NELSY Omalley [facility] SFE   2022  8:45 AM King Quinn PT SFEORPT SFE   2022  8:00 AM NELSY Omalley [facility]FE[facility]PT [facility]   2023  8:40 AM Edna Bhatia MD PPS [facility] AMB

## 2022-11-23 ENCOUNTER — APPOINTMENT (OUTPATIENT)
Dept: PHYSICAL THERAPY | Age: 52
End: 2022-11-23
Payer: COMMERCIAL

## 2022-11-28 ENCOUNTER — APPOINTMENT (OUTPATIENT)
Dept: PHYSICAL THERAPY | Age: 52
End: 2022-11-28
Payer: COMMERCIAL

## 2022-11-29 ENCOUNTER — APPOINTMENT (OUTPATIENT)
Dept: PHYSICAL THERAPY | Age: 52
End: 2022-11-29
Payer: COMMERCIAL

## 2022-11-30 ENCOUNTER — APPOINTMENT (OUTPATIENT)
Dept: PHYSICAL THERAPY | Age: 52
End: 2022-11-30
Payer: COMMERCIAL

## 2022-12-01 ENCOUNTER — HOSPITAL ENCOUNTER (OUTPATIENT)
Dept: PHYSICAL THERAPY | Age: 52
Setting detail: RECURRING SERIES
End: 2022-12-01
Payer: COMMERCIAL

## 2022-12-01 NOTE — PROGRESS NOTES
Joy Luther  : 1970  Primary: Nika Hernandez  Secondary:  Ron León  [address] [facility] [address]  Phone: [phone]  Fax: [phone]    PT Visit Info: Total # of Visits to Date: 6  Progress Note Counter: 6  Progress Note Due Date: 22  Canceled Appointment: 3 (22)     OT Visit Info:  No data recorded    OUTPATIENT THERAPY:OP NOTE TYPE: Progress Report 2022               Episode  Appt Desk           Joy Luther cancelled her appointment for today due to illness. Will plan to follow up next during next appointment.   Thank you,  Chacho Garrison, PT    Future Appointments   Date Time Provider Uriel Leone   2022  7:00 PM Paty Boudreaux, PT [facility] SFE   2022  7:00 PM NELSY Arango [facility] SFE   2022  8:00 AM NELSY Arango [facility] SFE   2022  8:45 AM Enriqueta Brunner, PT SFEORPT SFE   12/15/2022  9:00 AM MD SUDHAKAR Dawn [facility] AMB   12/15/2022  2:30 PM Mojgan Joshi PT [facility] SFE   12/15/2022  3:15 PM NELSY Arango [facility]   2022 11:20 AM YANI Nash - CNP PSCD G[facility] AMB   2022  8:00 AM NELSY Arango [facility] SFE   2022  8:45 AM Enriqueta Brunner PT SFEORPT SFE   2022  8:00 AM NELSY Arango [facility] SFE   2023  8:40 AM Sarah Dukes MD PPS G[facility] AMB

## 2022-12-06 ENCOUNTER — APPOINTMENT (OUTPATIENT)
Dept: PHYSICAL THERAPY | Age: 52
End: 2022-12-06
Payer: COMMERCIAL

## 2022-12-08 ENCOUNTER — APPOINTMENT (OUTPATIENT)
Dept: PHYSICAL THERAPY | Age: 52
End: 2022-12-08
Payer: COMMERCIAL

## 2022-12-13 ENCOUNTER — HOSPITAL ENCOUNTER (OUTPATIENT)
Dept: PHYSICAL THERAPY | Age: 52
Setting detail: RECURRING SERIES
End: 2022-12-13
Payer: COMMERCIAL

## 2022-12-13 ENCOUNTER — APPOINTMENT (OUTPATIENT)
Dept: PHYSICAL THERAPY | Age: 52
End: 2022-12-13
Payer: COMMERCIAL

## 2022-12-15 ENCOUNTER — APPOINTMENT (OUTPATIENT)
Dept: PHYSICAL THERAPY | Age: 52
End: 2022-12-15
Payer: COMMERCIAL

## 2022-12-19 NOTE — PROGRESS NOTES
Keysha Reid  : 1970  Primary: Nika Hernandez  Secondary:  Cassius Solis [address]  [address] [place] [address] Way [address]  Phone: [phone]  Fax: [phone] No data recorded  No data recorded    SLP VISIT INFO  Effective Dates:   11/10/2022 TO 2023   Frequency/Duration   2 times a week for 90 days    OUTPATIENT SPEECH PATHOLOGY NOTE:Daily Note 2022  Appt Desk   Episode      Treatment Diagnosis: R41.841 Cognitive-Communication Deficit  Medical/Referring Diagnosis:  No admission diagnoses are documented for this encounter. Referring Physician:  YANI Cerna NP, MD Orders:  Mingo Alma and Treat   Allergies:  Amlodipine, Lisinopril, Olmesartan, Paroxetine hcl, Hydrochlorothiazide, Rosuvastatin, Eggs or egg-derived products, Indapamide, Semaglutide, Tramadol, Butorphanol, Cephalexin, Penicillins, and Ticagrelor  Medications Last Reviewed:  2022  Subjective Comments:  Patient doing well today. States she has cough still from COVID-19. Patient feels memory getting worse. Pain:  Patient does not c/o pain. Interventions Planned: (Treatment may consist of any combination of the following)    GOALS: (Goals have been discussed and agreed upon with patient.)  Short-Term Functional Goals: Time Frame: 12 weeks  Patient will perform multiple step sequencing planning tasks based in daily activities with 80% accuracy with min verbal cues. Patient will perform prospective memory tasks after a 15-30 minute interval using external aid (example, clock). Patient will recall relevant verbal information with >80% accuracy with min cues. Patient will plan and prioritize daily tasks with 90% accuracy given no cueing. Patient will complete functional short-term memory tasks with 85% accuracy with minimal assistance. Patient will complete functional calculations with 85% accuracy with minimal assistance.   Patient will utilize memory compensatory strategies to improve recall of functional information with 80% accuracy given min assistance. Discharge Goals: Time Frame: 12 weeks  Patient will improve neuro-linguistic abilities to participate in a functional living environment     Updated Objective Findings:  None Today  Treatment   Cognitive-communicative activities:  Spent majority of session addressing compensatory strategies to assist in completing cognitive tasks with selective attention, memory recall, and sequencing tasks of daily living. Re-initiated visual handout with extensive list of aids/strategies to implement to aid in processing, memory, and attention: writing things down, rehearsal, chunking information    Input appointments into calendar x2 mod cues  Reviewed prospective memory tasks with 80% accuracy with mod cues  Patient continues to use external timer to sustain attention with task, starting with short increments and slowly increasing  Strategies discussed to implement restating comments heard to aid in memory for conversation - patient utilized in session 2x  Strategy of writing things down/list tracking on phone (groceries, to do list, etc.)   Recalled recent events of last 2 weeks with 80% accuracy  Verbal rehearsal to ensure completing tasks (ex: \"I turned the oven off\"), verbal restating in conversations to sustain attention and maintain topic    Treatment/Session Summary:  Patient states external aids will be helpful for reminding her of upcoming appointments, events. Reports worsening difficulty with memory. Flat affect today. Communication/Consultation:  None today  Recommendations/Intent for next treatment session: Next visit will focus on cognitive-communicative skills.     Total Duration:  Time In: 0800  Time Out: 0845  Minutes: [address], SLP    Visit # Therapist initials Date Arrived NS/ Cx < 24 hr >24 hr Cx Visit Comments   1 BC 11/10 X   Initial Assessment Only Today   2 BC 11/11 X      3 BC 11/15 X       BC 11/22   X No schedule availability    [facility] 11/24   X Clinic holiday closed     12/1-12/15   X COVID-19   4 BC 12/20 X      5 BC 1/3                                                                                                   Abbreviations: NS = No Show; CX = cancelled

## 2022-12-20 ENCOUNTER — HOSPITAL ENCOUNTER (OUTPATIENT)
Dept: PHYSICAL THERAPY | Age: 52
Setting detail: RECURRING SERIES
Discharge: HOME | End: 2022-12-23
Payer: COMMERCIAL

## 2022-12-20 PROCEDURE — 97112 NEUROMUSCULAR REEDUCATION: CPT

## 2022-12-20 PROCEDURE — 97129 THER IVNTJ 1ST 15 MIN: CPT

## 2022-12-20 PROCEDURE — 97130 THER IVNTJ EA ADDL 15 MIN: CPT

## 2022-12-20 ASSESSMENT — PAIN DESCRIPTION - ORIENTATION: ORIENTATION: RIGHT

## 2022-12-20 ASSESSMENT — PAIN SCALES - GENERAL: PAINLEVEL_OUTOF10: 2

## 2022-12-20 ASSESSMENT — PAIN DESCRIPTION - LOCATION: LOCATION: NECK

## 2022-12-20 NOTE — PROGRESS NOTES
Mellissa Dominguez  : 1970  Primary: [address]  Secondary:  Leslie Maier  [address] [place] [address]  Phone: [phone]  Fax: [phone] Plan Frequency: 1-2 times per week for 8-12 weeks  Plan of Care/Certification Expiration Date: 23      PT Visit Info: Total # of Visits to Date: 7  Progress Note Counter: 7  Progress Note Due Date: 22  Canceled Appointment: 3 (22)     Visit Count:  7   OUTPATIENT PHYSICAL THERAPY:OP NOTE TYPE: Treatment Note 2022       Episode  }Appt Desk             Treatment Diagnosis:  Other abnormalities of gait and mobility (R26.89)  Dizziness and Giddiness (R42)  Medical/Referring Diagnosis:  No admission diagnoses are documented for this encounter. R42 (ICD-10-CM) - Dizziness and giddiness  Referring Physician:  Gennett Mortimer, APRN - NP MD Orders:  PT Eval and Treat   Date of Onset:  No data recorded   Allergies:   Amlodipine, Lisinopril, Olmesartan, Paroxetine hcl, Hydrochlorothiazide, Rosuvastatin, Eggs or egg-derived products, Indapamide, Semaglutide, Tramadol, Butorphanol, Cephalexin, Penicillins, and Ticagrelor  Restrictions/Precautions:  No data recorded  No data recorded   Interventions Planned (Treatment may consist of any combination of the following):    Current Treatment Recommendations: Strengthening; Balance training; Gait training; Neuromuscular re-education; Home exercise program; Vestibular rehab     Subjective Comments:   \"I had a relapse with my vertigo. Everytime I roll in the bed I get vertigo. I am having more pain along the right side of my neck\". Initial:}Right Neck 2/10Post Session:  Right  Neck 0/10  Medications Last Reviewed:  2022  Updated Objective Findings:  None Today   Treatment   NEUROMUSCULAR RE-EDUCATION: (40 minutes):    Exercise/activities per grid below to improve balance, coordination, kinesthetic sense, posture, and proprioception.   Required minimal verbal cues to promote static and dynamic balance in standing. Activity   Date  12/20/22 Date  11/1/22 Date  11/4/22 Date  11/9/22 Date  11/11/22 Date  11/17/22   Activity/Exercise   Sets/reps/equipment Sets/reps/  equipment Sets/reps/  equipment Sets/reps/  equipment Sets/reps/  equipment Sets/reps/  equipment   Walking with head turns     X 4 laps 4 laps 4 laps 4 laps 4 laps   Walking with head up & down     X 4 laps 4 laps 4 laps 4 laps 4 laps   Step ups      6 inch  2x10 reps 6 inch  2x10 reps 6 inch  2x10 reps 6 inch  2x10 reps    Step taps      6 inch  10 reps  Crossstap 10 reps 6 inch  10 reps  Crossstap 10 reps 6 inch  10 reps  Crosstaps 10 reps 6 inch  10 reps  Crosstaps 10 reps 6 inch  10 reps  Crosstaps 10 reps   Marching   4 laps 4 laps 4 laps 4 laps 4 laps 4 laps   Sidestepping   4 laps 4 laps 4 laps 4 laps 4 laps 4 laps   Walking diagonals      4 laps   [place]           Walking  backwards     4 laps  4 Laps 4 laps 4 laps 4 laps   Tandem walking        4 laps   Weaving in/out of cones     4 laps  4 laps 4 laps 4 laps 4 laps   Walking in long damon      Working on heel strike, stride length and arm swing. Sports cord             [employer] ball           Figure 8s            Circles right/left           Walking with 360 degree turns           Stepping over half foam   Forward 10 reps bilateral  Lateral  10 reps bilateral Forward 10 reps bilateral  Lateral  10 reps bilateral Forward 10 reps bilateral  Lateral  10 reps bilateral Forward 10 reps bilateral  Lateral  10 reps bilateral Forward 10 reps bilateral  Lateral  10 reps bilateral Forward 10 reps bilateral  Lateral  10 reps bilateral   Weight shifting:    Left & Right             Weight shifting:   Forward & Backward              Static Standing Balance             Standing with feet apart       Blue foams eyes open with head turns and up/down, blue foams eyes closed Blue foams eyes open with head turns and up/down, blue foams eyes closed Blue foams eyes open with head turns and up/down, blue foams eyes closed [place] eyes open and eyes closed   Standing with feet together      Eyes closed Eyes closed Eyes closed     Standing with feet semitandem    Eyes open and eyes closed Blue foams Eyes open and eyes closed Blue foams Eyes open and eyes closed Blue foams Eyes open and eyes closed    Standing with feet tandem           Single leg stance           X1/X2 Viewing exercises             Hallpike-Barbra testing for BPPV (Benign Paroxysmal Positional Vertigo)     Positive R side Negative right       Fuentes-Daroff exercises           Canalith Repositioning treatment/Epley Maneuver  for BPPV (Benign Paroxysmal Positional Vertigo)   1) R Epley x 2 reps            Smart Equitest Training: See scanned report. Pt education Reviewed movement restrictions          THERAPEUTIC EXERCISE: (0 minutes):    Exercises per grid below to improve mobility, strength, and balance. Required minimal verbal cues to promote proper body alignment, promote proper body posture, and promote proper body mechanics. Progressed resistance, repetitions, and complexity of movement as indicated. Date:  11/9/22 Date:  11/11/22 Date:  11/17/22   Activity/Exercise Parameters Parameters Parameters   Sit<>stand X 10 reps from chair with no UE assist X 10 reps from chair with no UE assist X 10 reps from chair with no UE assist   nustep Level 2 x 6 minutes Level 3 x 6 minutes Level 3 x 7 minutes   walking In long damon, bldg 131 and 135, and ramps, working on heel strike, stride length and arm swing. In long damon, bldg 131 and 135, and ramps, working on heel strike, stride length and arm swing. In long damon, bldg 131 and 135, and ramps, working on heel strike, stride length and arm swing. MANUAL THERAPY: (5 minutes): Soft tissue mobilization was utilized and necessary because of the patient's painful spasm.    In seated position, patient received trigger point release along right upper trap/SCM to decrease pain. Skin intact after treatment. Treatment/Session Summary:    Treatment Assessment:  Patient positive for right BPPV. Patient tolerated treatment well. Patient reports decreased neck pain after manual therapy. Communication/Consultation:  None today  Equipment provided today:  none  Recommendations/Intent for next treatment session: Next visit will focus on balance and vestibular training.     Total Treatment Billable Duration:  45 minutes  Time In: 0845  Time Out: 0930    Zi Tsai PT       Charge Capture  }Post Session Pain  PT Visit Info  MedBridge Portal  MD Guidelines  Scanned Media  Benefits  MyChart    Future Appointments   Date Time Provider Uriel Leone   1/3/2023  8:00 AM Uvaldo Young, PT [facility]   1/3/2023  8:45 AM Agustina Denny, SLP SFEORPT SFE   1/10/2023  8:45 AM Agustina Denny, SLP SFEORPT SFE   1/10/2023  9:30 AM Hoa Norman, PT [facility] SFE   1/17/2023 10:15 AM Rosalia Napier, PT SFEORPT SFE   1/17/2023 11:00 AM Agustina Denny SLP SFEORPT SFE   1/24/2023  8:45 AM Agustina Denny, SLP SFEORPT SFE   1/24/2023  9:30 AM Hoa Norman, PT [facility] SFE   1/31/2023 10:15 AM Uvaldo Young, PT SFEORPT SFE   1/31/2023 11:00 AM Agustina Denny SLP SFEORPT SFE   5/17/2023  8:40 AM Kar House MD PPS GVL AMB

## 2022-12-22 ENCOUNTER — APPOINTMENT (OUTPATIENT)
Dept: PHYSICAL THERAPY | Age: 52
End: 2022-12-22
Payer: COMMERCIAL

## 2022-12-28 RX ORDER — CLOPIDOGREL BISULFATE 75 MG/1
TABLET ORAL
Qty: 90 TABLET | Refills: 3 | Status: SHIPPED | OUTPATIENT
Start: 2022-12-28

## 2022-12-28 RX ORDER — RANOLAZINE 500 MG/1
TABLET, EXTENDED RELEASE ORAL
Qty: 180 TABLET | Refills: 3 | Status: SHIPPED | OUTPATIENT
Start: 2022-12-28

## 2023-01-03 ENCOUNTER — HOSPITAL ENCOUNTER (OUTPATIENT)
Dept: PHYSICAL THERAPY | Age: 53
Setting detail: RECURRING SERIES
Discharge: HOME | End: 2023-01-06
Payer: COMMERCIAL

## 2023-01-03 PROCEDURE — 97112 NEUROMUSCULAR REEDUCATION: CPT

## 2023-01-03 PROCEDURE — 97130 THER IVNTJ EA ADDL 15 MIN: CPT

## 2023-01-03 PROCEDURE — 97129 THER IVNTJ 1ST 15 MIN: CPT

## 2023-01-03 ASSESSMENT — PAIN DESCRIPTION - ORIENTATION: ORIENTATION: LOWER

## 2023-01-03 ASSESSMENT — PAIN DESCRIPTION - LOCATION: LOCATION: BACK

## 2023-01-03 ASSESSMENT — PAIN SCALES - GENERAL: PAINLEVEL_OUTOF10: 8

## 2023-01-03 NOTE — PROGRESS NOTES
Jamil Payne  : 1970  Primary: Nika Hernandez  Secondary:  Mario Solis 80  [address] [address]  Phone: [phone]  Fax: [phone] No data recorded  No data recorded    SLP VISIT INFO  Effective Dates:   11/10/2022 TO 2023   Frequency/Duration   2 times a week for 90 days    OUTPATIENT SPEECH PATHOLOGY NOTE:Daily Note 1/3/2023  Appt Desk   Episode      Treatment Diagnosis: R41.841 Cognitive-Communication Deficit  Medical/Referring Diagnosis:  No admission diagnoses are documented for this encounter. Referring Physician:  YANI Garcia NP, MD Orders:  Lazarus Singleton and Treat   Allergies:  Amlodipine, Lisinopril, Olmesartan, Paroxetine hcl, Hydrochlorothiazide, Rosuvastatin, Eggs or egg-derived products, Indapamide, Semaglutide, Tramadol, Butorphanol, Cephalexin, Penicillins, and Ticagrelor  Medications Last Reviewed:  1/3/2023  Subjective Comments:  Patient states she fell on [place] Day and hurt her lower back. Pain:  Patient does not c/o pain. Interventions Planned: (Treatment may consist of any combination of the following)    GOALS: (Goals have been discussed and agreed upon with patient.)  Short-Term Functional Goals: Time Frame: 12 weeks  Patient will perform multiple step sequencing planning tasks based in daily activities with 80% accuracy with min verbal cues. Patient will perform prospective memory tasks after a 15-30 minute interval using external aid (example, clock). Patient will recall relevant verbal information with >80% accuracy with min cues. Patient will plan and prioritize daily tasks with 90% accuracy given no cueing. Patient will complete functional short-term memory tasks with 85% accuracy with minimal assistance. Patient will complete functional calculations with 85% accuracy with minimal assistance.   Patient will utilize memory compensatory strategies to improve recall of functional information with 80% accuracy given min assistance. Discharge Goals: Time Frame: 12 weeks  Patient will improve neuro-linguistic abilities to participate in a functional living environment     Updated Objective Findings:  None Today  Treatment   Cognitive-communicative activities:  Spent majority of session addressing compensatory strategies to assist in completing cognitive tasks with selective attention, memory recall, and sequencing tasks of daily living. Strategies included: memory journals, writing things down, verbal rehearsal, grocery lists, timers    Reviewed prospective memory tasks with 80% accuracy with mod cues  Patient continues to use external timer to sustain attention with task, starting with short increments and slowly increasing  Strategies discussed to implement restating comments heard to aid in memory for conversation - patient utilized in session 1x  Recalled recent events of last 2 weeks x7  Reviewed strategies of pill box, phone notification reminders to aid in managing medication; Pt states she has 9-12 morning meds, 4-5 in the afternoon and finds she doesn't remember if she has taken a medication or not  Completed 5 entries for daily memory journal     Treatment/Session Summary:  Home exercise program of daily memory journals. Patient states clutter throughout house, stating she starts many tasks but does not consistently complete them. Communication/Consultation:  None today  Recommendations/Intent for next treatment session: Next visit will focus on cognitive-communicative skills.     Total Duration:  Time In: 0845  Time Out: 2181  Minutes: Pedro Faith, SLP    Visit # Therapist initials Date Arrived NS/ Cx < 24 hr >24 hr Cx Visit Comments   1 BC 11/10 X   Initial Assessment Only Today   2 BC 11/11 X      3 BC 11/15 X       BC 11/22   X No schedule availability    [facility] 11/24   X Clinic holiday closed     12/1-12/15   X COVID-19   4 BC 12/20 X       BC 12/26-30   X SLP out   5 BC 1/3 X      6 BC 1/10       7 BC 1/17       8 BC 1/24       9 BC 1/31                                                               Abbreviations: NS = No Show; CX = cancelled

## 2023-01-03 NOTE — PROGRESS NOTES
Katharine Days  : 1970  Primary: Nika Hernandez  Secondary:  Janis Mai  [address] [facility] [address]  Phone: [phone]  Fax: [phone] Plan Frequency: 1-2 times per week for 8-12 weeks  Plan of Care/Certification Expiration Date: 23      PT Visit Info: Total # of Visits to Date: 8  Progress Note Counter: 8  Progress Note Due Date: 22  Canceled Appointment: 3 (22)     Visit Count:  8   OUTPATIENT PHYSICAL THERAPY:OP NOTE TYPE: Treatment Note 1/3/2023       Episode  }Appt Desk             Treatment Diagnosis:  Other abnormalities of gait and mobility (R26.89)  Dizziness and Giddiness (R42)  Medical/Referring Diagnosis:  No admission diagnoses are documented for this encounter. R42 (ICD-10-CM) - Dizziness and giddiness  Referring Physician:  YANI Chaves NP, MD Orders:  PT Eval and Treat   Date of Onset:  No data recorded   Allergies:   Amlodipine, Lisinopril, Olmesartan, Paroxetine hcl, Hydrochlorothiazide, Rosuvastatin, Eggs or egg-derived products, Indapamide, Semaglutide, Tramadol, Butorphanol, Cephalexin, Penicillins, and Ticagrelor  Restrictions/Precautions:  No data recorded  No data recorded   Interventions Planned (Treatment may consist of any combination of the following):    Current Treatment Recommendations: Strengthening; Balance training; Gait training; Neuromuscular re-education; Home exercise program; Vestibular rehab     Subjective Comments:   Patient reports falling  on Brynn day. \"I went to  my cat and my right ankle gave way. I fell on my lower back\". Initial:}Lower Back 8/10Post Session:  Lower  Back 8/10  Medications Last Reviewed:  1/3/2023  Updated Objective Findings:  None Today   Treatment   NEUROMUSCULAR RE-EDUCATION: (40 minutes):    Exercise/activities per grid below to improve balance, coordination, kinesthetic sense, posture, and proprioception.   Required minimal verbal cues to promote static and dynamic balance in standing. Activity   Date  12/20/22 Date  1/3/23 Date  11/4/22 Date  11/9/22 Date  11/11/22 Date  11/17/22   Activity/Exercise   Sets/reps/equipment Sets/reps/  equipment Sets/reps/  equipment Sets/reps/  equipment Sets/reps/  equipment Sets/reps/  equipment   Walking with head turns     X 4 laps 4 laps 4 laps 4 laps 4 laps   Walking with head up & down     X 4 laps 4 laps 4 laps 4 laps 4 laps   Step ups      X 6 inch  2x10 reps 6 inch  2x10 reps 6 inch  2x10 reps    Step taps      X 6 inch  10 reps  Crossstap 10 reps 6 inch  10 reps  Crosstaps 10 reps 6 inch  10 reps  Crosstaps 10 reps 6 inch  10 reps  Crosstaps 10 reps   Marching   4 laps 4 laps 4 laps 4 laps 4 laps 4 laps   Sidestepping   4 laps 4 laps 4 laps 4 laps 4 laps 4 laps   Walking diagonals      4 laps   [place]           Walking  backwards     4 laps 4 laps 4 Laps 4 laps 4 laps 4 laps   Tandem walking        4 laps   Weaving in/out of cones     4 laps 4 laps 4 laps 4 laps 4 laps 4 laps   Walking in long damon     Working on heel strike, stride length and arm swing. Working on heel strike, stride length and arm swing. Sports cord             [employer] ball           Figure 8s            Circles right/left           Walking with 360 degree turns           Stepping over half foam   Forward 10 reps bilateral  Lateral  10 reps bilateral Forward 10 reps bilateral  Lateral  10 reps bilateral Forward 10 reps bilateral  Lateral  10 reps bilateral Forward 10 reps bilateral  Lateral  10 reps bilateral Forward 10 reps bilateral  Lateral  10 reps bilateral Forward 10 reps bilateral  Lateral  10 reps bilateral   Weight shifting:    Left & Right             Weight shifting:   Forward & Backward              Static Standing Balance             Standing with feet apart      Blue foams eyes open with head turns and up/down, blue foams eyes closed Blue foams eyes open with head turns and up/down, blue foams eyes closed Blue foams eyes open with head turns and up/down, blue foams eyes closed Blue foams eyes open with head turns and up/down, blue foams eyes closed [place] eyes open and eyes closed   Standing with feet together      Eyes closed Eyes closed Eyes closed     Standing with feet semitandem    X Blue foams Eyes open and eyes closed Blue foams Eyes open and eyes closed Blue foams Eyes open and eyes closed    Standing with feet tandem           Single leg stance           X1/X2 Viewing exercises             Hallpike-Barbra testing for BPPV (Benign Paroxysmal Positional Vertigo)     Positive R side        Fuentes-Daroff exercises           Canalith Repositioning treatment/Epley Maneuver  for BPPV (Benign Paroxysmal Positional Vertigo)   1) R Epley x 2 reps            Smart Equitest Training: See scanned report. Pt education Reviewed movement restrictions          THERAPEUTIC EXERCISE: (0 minutes):    Exercises per grid below to improve mobility, strength, and balance. Required minimal verbal cues to promote proper body alignment, promote proper body posture, and promote proper body mechanics. Progressed resistance, repetitions, and complexity of movement as indicated. Date:  11/9/22 Date:  11/11/22 Date:  11/17/22   Activity/Exercise Parameters Parameters Parameters   Sit<>stand X 10 reps from chair with no UE assist X 10 reps from chair with no UE assist X 10 reps from chair with no UE assist   nustep Level 2 x 6 minutes Level 3 x 6 minutes Level 3 x 7 minutes   walking In long damon, bld[address] and [address], and ramps, working on heel strike, stride length and arm swing. In long damon, bldg [address] and 135, and ramps, working on heel strike, stride length and arm swing. In long damon, bldg [address] and 135, and ramps, working on heel strike, stride length and arm swing.                                   Treatment/Session Summary:    Treatment Assessment:  Patient needed more standing rest breaks due to increased pain.  Patient unable to perform step taps, step ups, or sit to stands due to increased back pain. Patient advised to begin icing her back to decrease pain and inflammation from recent fall. Communication/Consultation:  None today  Equipment provided today:  none  Recommendations/Intent for next treatment session: Next visit will focus on balance and vestibular training.     Total Treatment Billable Duration:  45 minutes  Time In: 0800  Time Out: 0845    Linda Velez, PT       Charge Capture  }Post Session Pain  PT Visit Info  [facility] Portal  MD Guidelines  Scanned Media  Benefits  MyChart    Future Appointments   Date Time Provider Uriel Leone   1/10/2023  8:45 AM Juan Luis Kathleen, [facility]   1/10/2023  9:30 AM Kerwin Zambrano, PT [facility]   1/17/2023 10:15 AM Anita Napier, PT [facility]   1/17/2023 11:00 AM Juan Luis Kathleen SLP [facility]   1/24/2023  8:45 AM Juan Luis Kathleen SLP [facility]   1/24/2023  9:30 AM Kerwin Zambrano, PT [facility]   1/31/2023 10:15 AM Marie Sam, PT [facility]   1/31/2023 11:00 AM Juan Luis Kathleen, SLP [facility]   5/17/2023  8:40 AM Elkin Fenton MD [facility] AM[facility]

## 2023-01-09 NOTE — PROGRESS NOTES
Nasir Ramsay  : 1970  Primary: [address]  Secondary:  Selam [facility] [address]  [address] [address]  Phone: [phone]  Fax: [phone]       OUTPATIENT THERAPY:OP NOTE TYPE: Progress Report 1/10/2023               Episode  Appt Desk           Nasir Ramsay cancelled her appointment for today due to illness. Will plan to follow up next during next appointment.   Thank you,  NELSY Chaidez    Future Appointments   Date Time Provider Uriel Leone   1/10/2023  9:30 AM [facility]   2023 10:15 AM Fawad Morejon, PT [facility]   2023 11:00 AM [facility]   [facility]23  8:45 AM [facility]   2023  9:30 AM [facility]   2023 10:15 AM Fawad Morejon, PT [facility]   2023 11:00 AM NELSY Chaidez [facility]   2023  8:40 AM Abbie Okeefe MD [facility]

## 2023-01-10 ENCOUNTER — HOSPITAL ENCOUNTER (OUTPATIENT)
Dept: PHYSICAL THERAPY | Age: 53
Setting detail: RECURRING SERIES
End: 2023-01-10
Payer: COMMERCIAL

## 2023-01-10 NOTE — PROGRESS NOTES
Selin Saab  : 1970  Primary: Nika Hernandez  Secondary:  Jaqui Christina  [address] [facility] [address]  Phone: [phone]  Fax: [phone]    PT Visit Info: Total # of Visits to Date: 8  Progress Note Counter: 8  Progress Note Due Date: 22  Canceled Appointment: 4 (1/10/23 - sick)     OT Visit Info:  No data recorded    OUTPATIENT THERAPY:OP NOTE TYPE: Progress Report 1/10/2023               Episode  Appt Desk           Selin Saab cancelled her appointment for today due to illness. Will plan to follow up next during next appointment.   Thank you,  Mariposa Joseph, PT    Future Appointments   Date Time Provider Uriel Leone   2023 10:15 AM [facility]   [facility] 11:00 AM [facility]   2023  8:45 AM NELSY Nicole [facility]   2023  9:30 AM [facility]   2023 10:15 AM HEID[facility] Vaz[facility]   2023 11:00 AM NELSY Nicole [facility] Andree Furosanna   2023  8:40 AM Rod Patiño MD [facility] within normal limits

## 2023-01-13 NOTE — PROGRESS NOTES
Arpita Martinez  : 1970  Primary: Nika Hernandez  Secondary:  Trinda Fabry [address] [place] [address] Way [address]  Phone: [phone]  Fax: [phone] No data recorded  No data recorded    SLP VISIT INFO  Effective Dates:   11/10/2022 TO 2023   Frequency/Duration   2 times a week for 90 days    OUTPATIENT SPEECH PATHOLOGY NOTE:Daily Note 2023  Appt Desk   Episode      Treatment Diagnosis: R41.841 Cognitive-Communication Deficit  Medical/Referring Diagnosis:  No admission diagnoses are documented for this encounter. Referring Physician:  YANI Hayes NP, MD Orders:  Min Margarito and Treat   Allergies:  Amlodipine, Lisinopril, Olmesartan, Paroxetine hcl, Hydrochlorothiazide, Rosuvastatin, Eggs or egg-derived products, Indapamide, Semaglutide, Tramadol, Butorphanol, Cephalexin, Penicillins, and Ticagrelor  Medications Last Reviewed:  2023  Subjective Comments:  Patient states she has completed PT today. Pain:  Patient does not c/o pain. Interventions Planned: (Treatment may consist of any combination of the following)    GOALS: (Goals have been discussed and agreed upon with patient.)  Short-Term Functional Goals: Time Frame: 12 weeks  Patient will perform multiple step sequencing planning tasks based in daily activities with 80% accuracy with min verbal cues. Patient will perform prospective memory tasks after a 15-30 minute interval using external aid (example, clock). Patient will recall relevant verbal information with >80% accuracy with min cues. Patient will plan and prioritize daily tasks with 90% accuracy given no cueing. Patient will complete functional short-term memory tasks with 85% accuracy with minimal assistance. Patient will complete functional calculations with 85% accuracy with minimal assistance.   Patient will utilize memory compensatory strategies to improve recall of functional information with 80% accuracy given min assistance. Discharge Goals: Time Frame: 12 weeks  Patient will improve neuro-linguistic abilities to participate in a functional living environment     Updated Objective Findings:  None Today  Treatment   Cognitive-communicative activities:  Reviewed prospective memory tasks with 80% accuracy with mod cues  Patient continues to use external timer to sustain attention with task, starting with short increments and slowly increasing  Strategies discussed to implement restating comments heard to aid in memory for conversation - patient utilized in session 2x  Recalled recent events of last 2 weeks x5  Delayed recall after 5 minutes from a picture scene with 90% accuracy  Divided attention: 85% accuracy    Treatment/Session Summary:  States she has completed some memory journals but does not have them today. States she will keep them in a central location near her calendar to help implement into routine. Would like to make a spreadsheet for medications to aid in remembering to take medications. Will address next session. Communication/Consultation:  None today  Recommendations/Intent for next treatment session: Next visit will focus on cognitive-communicative skills.     Total Duration:  Time In: 1100  Time Out: 1140  Minutes: Pedro Faith, SLP    Visit # Therapist initials Date Arrived NS/ Cx < 24 hr >24 hr Cx Visit Comments   1 BC 11/10 X   Initial Assessment Only Today   2 BC 11/11 X      3 BC 11/15 X       BC 11/22   X No schedule availability    [facility] 11/24   X Clinic holiday closed     12/1-12/15   X COVID-19   4 BC 12/20 X       BC 12/26-30   X SLP out   5 BC 1/3 X       BC 1/10   X Pt not feeling well   6 BC 1/17 X      7 BC 1/24       8 BC 1/31       9 BC 2/7                                                      Abbreviations: NS = No Show; CX = cancelled

## 2023-01-17 ENCOUNTER — HOSPITAL ENCOUNTER (OUTPATIENT)
Dept: PHYSICAL THERAPY | Age: 53
Setting detail: RECURRING SERIES
Discharge: HOME | End: 2023-01-20
Payer: COMMERCIAL

## 2023-01-17 PROCEDURE — 97130 THER IVNTJ EA ADDL 15 MIN: CPT

## 2023-01-17 PROCEDURE — 97129 THER IVNTJ 1ST 15 MIN: CPT

## 2023-01-17 PROCEDURE — 97112 NEUROMUSCULAR REEDUCATION: CPT

## 2023-01-17 ASSESSMENT — PAIN SCALES - GENERAL: PAINLEVEL_OUTOF10: 0

## 2023-01-17 NOTE — THERAPY DISCHARGE
Carlos Swain  : 1970  Primary: Nika Hernandez  Secondary:  Som Andrews  [address] [facility] [address]  Phone: [phone]  Fax: [phone] Plan Frequency: 1-2 times per week for 8-12 weeks  Plan of Care/Certification Expiration Date: 23      PT Visit Info: Total # of Visits to Date: 9  Progress Note Counter: 9  Progress Note Due Date: 22  Canceled Appointment: 4 (1/10/23 - sick)      Visit Count:  9                OUTPATIENT PHYSICAL THERAPY:             OP NOTE TYPE: Discharge Summary 2023               Episode  Appt Desk         Treatment Diagnosis:  Other abnormalities of gait and mobility (R26.89)  Dizziness and Giddiness (R42)  Medical/Referring Diagnosis:  No admission diagnoses are documented for this encounter. R42 (ICD-10-CM) - Dizziness and giddiness  Referring Physician:  YANI Frey NP, MD Orders:  PT Eval and Treat   Return MD Appt:    Date of Onset:      Allergies:  Amlodipine, Lisinopril, Olmesartan, Paroxetine hcl, Hydrochlorothiazide, Rosuvastatin, Eggs or egg-derived products, Indapamide, Semaglutide, Tramadol, Butorphanol, Cephalexin, Penicillins, and Ticagrelor  Restrictions/Precautions:     Cardiac history      Medications Last Reviewed:  2023     SUBJECTIVE   History of Injury/Illness (Reason for Referral): Turn to R look to R, get vertigo. Vomited the first day. Has been propping up on pillows, but still rolls to R. Ringing in ears since covid. No pain. Has had some falls due to vertigo. Had heart attack  and got a virus too. ENT notes 10/3/22 indicate suspected vestibular neuritis  PER ENT note 10/3/22: Carlos Swain is a 47 y.o. female seen today in initial consultation for tinnitus and dizziness. She went into the ED at end of July w/ a viral illness and had acute MI.  She spent several days in hospital recovering and then when she got home, she started having a couple days of bad room-spinning vertigo, N/V and felt very off balance. Since then, she continues to have some episodic dizziness, which is mainly positional in nature- usually when turning her head/rolling over in bed to R side and extended her neck. This occurs daily and spinning sensation lasts for up to 30 secs. No further N/V. She has long h/o bilateral tinnitus which is present all the time. No relieving or aggravating factors. She had a hearing test down in [facility] earlier this yr which apparently revealed some mild high freq HL. This is new complaint and she has no previous otologic hx. She has not yet seen PT and tried the Epley maneuver. She was worked up w/ brain MRI recently which was clear. \"  Patient Stated Goal(s): \"To not be dizzy\"  Initial:     0/10 Post Session:     0/10  Past Medical History/Comorbidities:   Ms. Altaf Ca  has a past medical history of Asthma, CAD (coronary artery disease), Depression with anxiety, Diabetes ([address] Difficult intubation, GERD (gastroesophageal reflux disease), Hypertension, Nausea & vomiting, Obesity (BMI 30-39.9), Panic attacks, Prediabetes, Psychiatric disorder, Thromboembolus ([address] Unspecified adverse effect of anesthesia, and Unspecified sleep apnea. Ms. Altaf Ca  has a past surgical history that includes us guided core breast biopsy (Bilateral, 1993, 1995, 2000); pr unlisted procedure cardiac surgery; Septoplasty; Total abdominal hysterectomy w/ bilateral salpingoophorectomy (11/7/14); Cholecystectomy; Tonsillectomy (2006); Carpal tunnel release (Bilateral); Cardiac procedure (N/A, 7/25/2022); and Cardiac procedure (N/A, 7/25/2022).   Social History/Living Environment:   Lives With: Spouse     Prior Level of Function/Work/Activity:   Occupation: Full time employment  ADL Assistance: Independent  Active : Yes  Mode of Transportation: Car        Learning:   Does the patient/guardian have any barriers to learning?: No barriers  Will there be a co-learner?: No  What is the preferred language of the patient/guardian?: English  Is an  required?: No  How does the patient/guardian prefer to learn new concepts?: Listening; Reading; Demonstration; Pictures/Videos     Fall Risk Scale: Gutierrez Total Score: 40  Gutierrez Fall Risk: Medium (25-44)     Personal Factors:        Sex:  female        Age:  47 y.o. OBJECTIVE   Observations:      WNL  Functional Mobility:    independent  Sensation:   intact  Bed Mobility:   independent  Transfers:   independent  Strength:   LE STRENGTH:  4/5 B LE    Ambulation/WC:     Patient ambulates with no AD. She tends to walks stiffly. Balance / Vestibular:   Cervical AROM:     Visual/Oculomotor:     VOR Tests:       Positional Tests:     ASSESSMENT   Initial Assessment:  Patient presents with c/o vertigo and imbalance since July 2022. Patient c/o vertigo with turning head to R and down, as well as bending over, looking up, and rolling to the right. Patient has had some falls since vertigo started. Patient tested positively for R BPPV and was treated today with canalith repositioning maneuvers. Will reassess and treat further as needed next session. Patient would benefit from PT to address these problems to improve patient's independence and safety with mobility and daily activities. Thank you. Discharge note:  Patient has attended nine scheduled physical therapy appointments from 10/27/2022 to 1/17/2023. Patient demonstrates improved balance and decreased dizziness since beginning therapy. Patient discharged from physical therapy due to meeting all of her goals. Thank you for this referral.   Problem List: (Impacting functional limitations): Body Structures, Functions, Activity Limitations Requiring Skilled Therapeutic Intervention: Decreased functional mobility ; Decreased tolerance to work activity;  Decreased strength; Decreased balance; Vestibular Impairment     Therapy Prognosis:   Therapy Prognosis: Good     Assessment Complexity:      PLAN   Effective Dates: 10/27/2022 TO Plan of Care/Certification Expiration Date: 01/26/23     Frequency/Duration: Plan Frequency: 1-2 times per week for 8-12 weeks     Interventions Planned (Treatment may consist of any combination of the following):    Current Treatment Recommendations: Strengthening; Balance training; Gait training; Neuromuscular re-education; Home exercise program; Vestibular rehab     Goals: (Goals have been discussed and agreed upon with patient.)  Short-Term Functional Goals: Time Frame: 2-4 weeks  Patient will demonstrate independence and compliance with home exercise program to improve balance and dizziness for daily activities. Goal met. Discharge Goals: Time Frame: 8-12 weeks    Patient will increase her score on the dynamic gait index to greater than or equal to 22/24 indicating improved balance and safety for community ambulation. Goal met. Patient will ambulate with least assistive device over level and unlevel surfaces without evidence of imbalance to improve safety for daily activities. Goal met. Patient will report decreased dizziness to less than or equal to 1/10 with daily activities to improve safety and quality of life. Goal met. Outcome Measure: Tool Used: Dynamic Gait Index  Score:  Initial: 18/24 Most Recent: 22/24 (Date: 1- )   Interpretation of Score: Each section is scored on a 0-3 scale, 0 representing the patients inability to perform the task and 3 representing independence. The scores of each section are added together for a total score of 24. Any score below 19 indicates increased risk for falls.              Post Session Pain  Charge Capture  PT Visit Info MD Guidelines  Andrew

## 2023-01-17 NOTE — PROGRESS NOTES
Holli Bangura  : 1970  Primary: Nika Hernandez  Secondary:  Mariluz Patiño  [address] [facility] [address]  Phone: [phone]  Fax: [phone] Plan Frequency: 1-2 times per week for 8-12 weeks  Plan of Care/Certification Expiration Date: 23      PT Visit Info: Total # of Visits to Date: 9  Progress Note Counter: 9  Progress Note Due Date: 22  Canceled Appointment: 4 (1/10/23 - sick)     Visit Count:  9   OUTPATIENT PHYSICAL THERAPY:OP NOTE TYPE: Treatment Note 2023       Episode  }Appt Desk             Treatment Diagnosis:  Other abnormalities of gait and mobility (R26.89)  Dizziness and Giddiness (R42)  Medical/Referring Diagnosis:  No admission diagnoses are documented for this encounter. R42 (ICD-10-CM) - Dizziness and giddiness  Referring Physician:  YANI Irene NP, MD Orders:  PT Eval and Treat   Date of Onset:  No data recorded   Allergies:   Amlodipine, Lisinopril, Olmesartan, Paroxetine hcl, Hydrochlorothiazide, Rosuvastatin, Eggs or egg-derived products, Indapamide, Semaglutide, Tramadol, Butorphanol, Cephalexin, Penicillins, and Ticagrelor  Restrictions/Precautions:  No data recorded  No data recorded   Interventions Planned (Treatment may consist of any combination of the following):    Current Treatment Recommendations: Strengthening; Balance training; Gait training; Neuromuscular re-education; Home exercise program; Vestibular rehab     Subjective Comments:   Patient has had no falls. Patient reports feeling more stable today. Initial:}    010Post Session:       010  Medications Last Reviewed:  2023  Updated Objective Findings:   See discharge note    Treatment   NEUROMUSCULAR RE-EDUCATION: (35 minutes):    Exercise/activities per grid below to improve balance, coordination, kinesthetic sense, posture, and proprioception.   Required minimal verbal cues to promote static and dynamic balance in standing. Activity   Date  12/20/22 Date  1/3/23 Date  1/17/23 Date  11/9/22 Date  11/11/22 Date  11/17/22   Activity/Exercise   Sets/reps/equipment Sets/reps/  equipment Sets/reps/  equipment Sets/reps/  equipment Sets/reps/  equipment Sets/reps/  equipment   Walking with head turns     X 4 laps 4 laps 4 laps 4 laps 4 laps   Walking with head up & down     X 4 laps 4 laps 4 laps 4 laps 4 laps   Step ups      X 6 inch  2x10 reps 6 inch  2x10 reps 6 inch  2x10 reps    Step taps      X 6 inch  10 reps  Crossstap 10 reps 6 inch  10 reps  Crosstaps 10 reps 6 inch  10 reps  Crosstaps 10 reps 6 inch  10 reps  Crosstaps 10 reps   Marching   4 laps 4 laps 4 laps 4 laps 4 laps 4 laps   Sidestepping   4 laps 4 laps 4 laps 4 laps 4 laps 4 laps   Walking diagonals      4 laps   [place]           Walking  backwards     4 laps 4 laps 4 Laps 4 laps 4 laps 4 laps   Tandem walking        4 laps   Weaving in/out of cones     4 laps 4 laps 4 laps 4 laps 4 laps 4 laps   Walking in long damon     Working on heel strike, stride length and arm swing. Sports cord             [employer] ball           Figure 8s            Circles right/left           Walking with 360 degree turns           Stepping over half foam   Forward 10 reps bilateral  Lateral  10 reps bilateral Forward 10 reps bilateral  Lateral  10 reps bilateral Forward 10 reps bilateral  Lateral  10 reps bilateral Forward 10 reps bilateral  Lateral  10 reps bilateral Forward 10 reps bilateral  Lateral  10 reps bilateral Forward 10 reps bilateral  Lateral  10 reps bilateral   Weight shifting:    Left & Right             Weight shifting:   Forward & Backward              Static Standing Balance             Standing with feet apart      Blue foams eyes open with head turns and up/down, blue foams eyes closed Blue foams eyes open with head turns and up/down, blue foams eyes closed Blue foams eyes open with head turns and up/down, blue foams eyes closed Blue foams eyes open with head turns and up/down, blue foams eyes closed [place] eyes open and eyes closed   Standing with feet together      Eyes closed Eyes closed Eyes closed     Standing with feet semitandem    X Blue foams Eyes open and eyes closed Blue foams Eyes open and eyes closed Blue foams Eyes open and eyes closed    Standing with feet tandem           Single leg stance           X1/X2 Viewing exercises             Hallpike-Barbra testing for BPPV (Benign Paroxysmal Positional Vertigo)     Positive R side  Right negative  Left negative      Fuentes-Daroff exercises           Canalith Repositioning treatment/Epley Maneuver  for BPPV (Benign Paroxysmal Positional Vertigo)   1) R Epley x 2 reps            [employer] Training: See scanned report. Pt education Reviewed movement restrictions          THERAPEUTIC EXERCISE: (0 minutes):    Exercises per grid below to improve mobility, strength, and balance. Required minimal verbal cues to promote proper body alignment, promote proper body posture, and promote proper body mechanics. Progressed resistance, repetitions, and complexity of movement as indicated. Date:  11/9/22 Date:  11/11/22 Date:  11/17/22   Activity/Exercise Parameters Parameters Parameters   Sit<>stand X 10 reps from chair with no UE assist X 10 reps from chair with no UE assist X 10 reps from chair with no UE assist   nustep Level 2 x 6 minutes Level 3 x 6 minutes Level 3 x 7 minutes   walking In long damon, [address], and ramps, working on heel strike, stride length and arm swing. In long damon, [address] and [address], and ramps, working on heel strike, stride length and arm swing. In long damon, [address], and ramps, working on heel strike, stride length and arm swing. Treatment/Session Summary:    Treatment Assessment:  Patient demonstrates improved balance and decreased dizziness since initial evaluation.   Communication/Consultation:  None today  Equipment provided today:  none  Recommendations/Intent for next treatment session: Patient discharged due to meeting all of her goals.     Total Treatment Billable Duration:  35 minutes  Time In: 6639  Time Out: 1100    JUNITO RENTERIA, PT       Charge Capture  }Post Session Pain  PT Visit Info  [facility]  MD Guidelines  Scanned Media  Benefits  MyChart    Future Appointments   Date Time Provider Uriel Leone   1/24/2023  8:45 AM NELSY [facility]   1/31/2023 11:00 AM NELSY [facility]   2/7/2023  9:30 AM [facility]   5/17/2023  8:40 AM Mynor Lainez MD [facility]

## 2023-01-19 ENCOUNTER — TELEPHONE (OUTPATIENT)
Dept: CARDIOLOGY CLINIC | Age: 53
End: 2023-01-19

## 2023-01-23 NOTE — TELEPHONE ENCOUNTER
Call placed to patient to schedule lung biopsy.    Spoke with:patient  Procedure date:4/6  Check in:0900  Procedure start time:1000  Sedation status:RN sed  Needs a :yes  NPO status:after midnight  Medications to take:everything except insulin (ML with Dr Lemon regarding giving patient instructions for holding insulin)  Medications to hold:insulin hold possibly depending on Dr Lemon instructions    Our number provided in case of questions or need to change appointment.     No call back after multiple calls to pt.

## 2023-01-24 ENCOUNTER — APPOINTMENT (OUTPATIENT)
Dept: PHYSICAL THERAPY | Age: 53
End: 2023-01-24
Payer: COMMERCIAL

## 2023-01-24 ENCOUNTER — HOSPITAL ENCOUNTER (OUTPATIENT)
Dept: PHYSICAL THERAPY | Age: 53
Setting detail: RECURRING SERIES
Discharge: HOME | End: 2023-01-27
Payer: COMMERCIAL

## 2023-01-24 PROCEDURE — 97129 THER IVNTJ 1ST 15 MIN: CPT

## 2023-01-24 PROCEDURE — 97130 THER IVNTJ EA ADDL 15 MIN: CPT

## 2023-01-24 NOTE — PROGRESS NOTES
Jesus Carroll  : 1970  Primary: Nika Hernandez  Secondary:  Judah Cole  [facility] [address]  [address] [place] [address] Way [address]  Phone: [phone]  Fax: [phone] No data recorded  No data recorded    SLP VISIT INFO  Effective Dates:   11/10/2022 TO 2023   Frequency/Duration   2 times a week for 90 days    OUTPATIENT SPEECH PATHOLOGY NOTE:Daily Note 2023  Appt Desk   Episode      Treatment Diagnosis: R41.841 Cognitive-Communication Deficit  Medical/Referring Diagnosis:  No admission diagnoses are documented for this encounter. Referring Physician:  YANI Solomon NP, MD Orders:  Ken Cabrera and Treat   Allergies:  Amlodipine, Lisinopril, Olmesartan, Paroxetine hcl, Hydrochlorothiazide, Rosuvastatin, Eggs or egg-derived products, Indapamide, Semaglutide, Tramadol, Butorphanol, Cephalexin, Penicillins, and Ticagrelor  Medications Last Reviewed:  2023  Subjective Comments:  Patient states she fell when bending over and hurt her wrist. States she has fallen in 7 times in the last year and a half. Patient just recently finished with physical therapy. Patient also states she is having to see a cornea specialist as she got dishwashing detergent pod in her eye which caused a burn. Pain:  Patient does not c/o pain. Interventions Planned: (Treatment may consist of any combination of the following)    GOALS: (Goals have been discussed and agreed upon with patient.)  Short-Term Functional Goals: Time Frame: 12 weeks  Patient will perform multiple step sequencing planning tasks based in daily activities with 80% accuracy with min verbal cues. Patient will perform prospective memory tasks after a 15-30 minute interval using external aid (example, clock). Patient will recall relevant verbal information with >80% accuracy with min cues. Patient will plan and prioritize daily tasks with 90% accuracy given no cueing.    Patient will complete functional short-term memory tasks with 85% accuracy with minimal assistance. Patient will complete functional calculations with 85% accuracy with minimal assistance. Patient will utilize memory compensatory strategies to improve recall of functional information with 80% accuracy given min assistance. Discharge Goals: Time Frame: 12 weeks  Patient will improve neuro-linguistic abilities to participate in a functional living environment     Updated Objective Findings:  None Today  Treatment   Cognitive-communicative activities:  Reviewed prospective memory tasks with 80% accuracy with mod cues  Patient continues to use external timer to sustain attention with task, starting with short increments and slowly increasing  Strategies discussed to implement restating comments heard to aid in memory for conversation - patient utilized in session 2x  Recalled recent events of last week x6  Delayed recall after 5 minutes from a picture scene with 75% accuracy  Divided attention: 85% accuracy  Created a check off sheet with patient to assist in managing medications     Treatment/Session Summary:  Patient appears fatigued throughout session. States she did not sleep well but is feeling increased fatigue recently. Communication/Consultation:  None today  Recommendations/Intent for next treatment session: Next visit will focus on cognitive-communicative skills.     Total Duration:  Time In: 0845  Time Out: 0930  Minutes: [address], SLP    Visit # Therapist initials Date Arrived NS/ Cx < 24 hr >24 hr Cx Visit Comments   1 BC 11/10 X   Initial Assessment Only Today   2 BC 11/11 X      3 BC 11/15 X       BC 11/22   X No schedule availability    [facility] 11/24   X Clinic holiday closed     12/1-12/15   X COVID-19   4 BC 12/20 X       BC 12/26-30   X SLP out   5 BC 1/3 X       BC 1/10   X Pt not feeling well   6 BC 1/17 X      7 BC 1/24 X      8 BC 1/31       9 BC 2/7 Abbreviations: NS = No Show; CX = cancelled

## 2023-01-25 ENCOUNTER — HOSPITAL ENCOUNTER (OUTPATIENT)
Dept: GENERAL RADIOLOGY | Age: 53
Discharge: HOME | End: 2023-01-28
Payer: COMMERCIAL

## 2023-01-25 DIAGNOSIS — R52 PAIN: ICD-10-CM

## 2023-01-25 PROCEDURE — 73110 X-RAY EXAM OF WRIST: CPT

## 2023-01-27 RX ORDER — COLCHICINE 0.6 MG/1
TABLET ORAL
Qty: 90 TABLET | Refills: 1 | Status: SHIPPED | OUTPATIENT
Start: 2023-01-27

## 2023-01-30 NOTE — PROGRESS NOTES
Ian Carpenter  : 1970  Primary: Nika Hernandez  [facility]  [facility]  [address] [address]  Phone: [phone]  Fax: [phone] No data recorded  No data recorded    SLP VISIT INFO  Effective Dates:   11/10/2022 TO 2023   Frequency/Duration   2 times a week for 90 days    OUTPATIENT SPEECH PATHOLOGY NOTE:Daily Note 2023  Appt Desk   Episode      Treatment Diagnosis: R41.841 Cognitive-Communication Deficit  Medical/Referring Diagnosis:  No admission diagnoses are documented for this encounter. Referring Physician:  YANI Steward NP, MD Orders:  Alonso Martinez and Treat   Allergies:  Amlodipine, Lisinopril, Olmesartan, Paroxetine hcl, Hydrochlorothiazide, Rosuvastatin, Eggs or egg-derived products, Indapamide, Semaglutide, Tramadol, Butorphanol, Cephalexin, Penicillins, and Ticagrelor  Medications Last Reviewed:  2023  Subjective Comments:  Patient states she has a steroid eye drop following burn to determine if she will need surgery. Pain:  Patient does not c/o pain. Interventions Planned: (Treatment may consist of any combination of the following)    GOALS: (Goals have been discussed and agreed upon with patient.)  Short-Term Functional Goals: Time Frame: 12 weeks  Patient will perform multiple step sequencing planning tasks based in daily activities with 80% accuracy with min verbal cues. Patient will perform prospective memory tasks after a 15-30 minute interval using external aid (example, clock). Patient will recall relevant verbal information with >80% accuracy with min cues. Patient will plan and prioritize daily tasks with 90% accuracy given no cueing. Patient will complete functional short-term memory tasks with 85% accuracy with minimal assistance. Patient will complete functional calculations with 85% accuracy with minimal assistance.   Patient will utilize memory compensatory strategies to improve recall of functional information with 80% accuracy given min assistance.    Discharge Goals: Time Frame: 12 weeks  Patient will improve neuro-linguistic abilities to participate in a functional living environment     Updated Objective Findings:  None Today  Treatment   Cognitive-communicative activities:  Reviewed prospective memory tasks with 90% accuracy with min cues  Patient continues to use external timer to sustain attention with task, starting with short increments and slowly increasing  Strategies discussed to implement restating comments heard to aid in memory for conversation - patient utilized in session 3x  Recalled recent events of last week x5  Divided attention: 85% accuracy  Consistency with use of check off sheet with patient to assist in managing medications >80%    Treatment/Session Summary:  She states she has used the check off sheet for managing medications and it has helped.   Communication/Consultation:  None today  Recommendations/Intent for next treatment session: Next visit will focus on cognitive-communicative skills.    Total Duration:  Time In: 1100  Time Out: 1140  Minutes: 40    April Nassar SLP    Visit # Therapist initials Date Arrived NS/ Cx < 24 hr >24 hr Cx Visit Comments   1 BC 11/10 X   Initial Assessment Only Today   2 BC 11/11 X      3 BC 11/15 X       BC 11/22   X No schedule availability    BC 11/24   X Clinic holiday closed     12/1-12/15   X COVID-19   4 BC 12/20 X       BC 12/26-30   X SLP out   5 BC 1/3 X       BC 1/10   X Pt not feeling well   6 BC 1/17 X      7 BC 1/24 X      8 BC 1/31 X      9 BC 2/7                                                      Abbreviations: NS = No Show; CX = cancelled

## 2023-01-31 ENCOUNTER — HOSPITAL ENCOUNTER (OUTPATIENT)
Dept: PHYSICAL THERAPY | Age: 53
Setting detail: RECURRING SERIES
Discharge: HOME | End: 2023-02-03
Payer: COMMERCIAL

## 2023-01-31 ENCOUNTER — APPOINTMENT (OUTPATIENT)
Dept: PHYSICAL THERAPY | Age: 53
End: 2023-01-31
Payer: COMMERCIAL

## 2023-01-31 ENCOUNTER — TELEPHONE (OUTPATIENT)
Dept: CARDIOLOGY CLINIC | Age: 53
End: 2023-01-31

## 2023-01-31 PROCEDURE — 97129 THER IVNTJ 1ST 15 MIN: CPT

## 2023-01-31 PROCEDURE — 97130 THER IVNTJ EA ADDL 15 MIN: CPT

## 2023-01-31 NOTE — TELEPHONE ENCOUNTER
Please call patient re: she has a hemoglobin of 18 and needs to know if we can put in an order to give blood so that her hemoglobin would go down.

## 2023-01-31 NOTE — TELEPHONE ENCOUNTER
Hgb today was 18. She was told to call here because her blood is thick. She is on Plavix/ASA. She said before when her blood got thick she had an MI. She is seeking advice from [address] [place] [address]?

## 2023-02-01 RX ORDER — BUDESONIDE AND FORMOTEROL FUMARATE DIHYDRATE 80; 4.5 UG/1; UG/1
2 AEROSOL RESPIRATORY (INHALATION) 2 TIMES DAILY
Qty: 1 EACH | Refills: 11 | Status: SHIPPED | OUTPATIENT
Start: 2023-02-01

## 2023-02-01 NOTE — TELEPHONE ENCOUNTER
Chart has been reviewed.   Dr. Gabi Nguyen I pended Symbicort 80 mcg to the patient's preferred pharmacy, please sign off if appropriate. // Elsy Giang

## 2023-02-06 NOTE — PROGRESS NOTES
Carlos Passer  : 1970  Primary: Nika Hernandez  Secondary:  Tara Sinclair  [facility] [address]  [address] [place] [address] Way [address]  Phone: [phone]  Fax: [phone] No data recorded  No data recorded    SLP VISIT INFO  Effective Dates:   11/10/2022 TO 2023   Frequency/Duration   2 times a week for 90 days    OUTPATIENT SPEECH PATHOLOGY NOTE:Daily Note 2023  Appt Desk   Episode      Treatment Diagnosis: R41.841 Cognitive-Communication Deficit  Medical/Referring Diagnosis:  No admission diagnoses are documented for this encounter. Referring Physician:  YANI Turner NP, MD Orders:  [place] and Treat   Allergies:  Amlodipine, Lisinopril, Olmesartan, Paroxetine hcl, Hydrochlorothiazide, Rosuvastatin, Eggs or egg-derived products, Indapamide, Semaglutide, Tramadol, Butorphanol, Cephalexin, Penicillins, and Ticagrelor  Medications Last Reviewed:  2023  Subjective Comments:  Patient states she feels speech therapy has helped and sees improvement. She states she still has some difficulty with memory but uses compensatory strategies to assist. Discharge today. Pain:  Patient does not c/o pain. Interventions Planned: (Treatment may consist of any combination of the following)  Cognitive linguistic based treatment, Training in compensatory strategies, and Education  GOALS: (Goals have been discussed and agreed upon with patient.)  Short-Term Functional Goals: Time Frame: 12 weeks  Patient will perform multiple step sequencing planning tasks based in daily activities with 80% accuracy with min verbal cues. MET 23  Patient will perform prospective memory tasks after a 15-30 minute interval using external aid (example, clock). MET 23  Patient will recall relevant verbal information with >80% accuracy with min cues. MET 23  Patient will plan and prioritize daily tasks with 90% accuracy given no cueing.   MET 23  Patient will complete functional short-term memory tasks with 85% accuracy with minimal assistance. MET 2/7/23  Patient will complete functional calculations with 85% accuracy with minimal assistance. MET 2/7/23  Patient will utilize memory compensatory strategies to improve recall of functional information with 80% accuracy given min assistance. MET 2/7/23  Discharge Goals: Time Frame: 12 weeks  Patient will improve neuro-linguistic abilities to participate in a functional living environment MET 2/7/23     Updated Objective Findings:  See evaluation note from today  Treatment   Cognitive-communicative activities:  Reviewed prospective memory tasks 15+ minutes with  90% accuracy with min cues  Patient continues to use external timer to sustain attention with task, starting with short increments and slowly increasing - discussed sequencing steps to take on projects patient would like to complete at home  Strategies discussed to implement restating comments heard to aid in memory for conversation - patient utilized in session 5x  Recalled recent events of last week x6  Divided attention: 85% accuracy  Delayed recall from picture scene after 10 minutes with 85% accuracy independently  Immediate recall with 18/20 pieces of information correct independently  Consistency with use of check off sheet with patient to assist in managing medications >90%    Treatment/Session Summary:  Reviewed strategies to continue with at home and environmental settings. Discharge today. Patient in agreement. Communication/Consultation:  None today  Recommendations/Intent for next treatment session: No further speech therapy indicated at this time.     Total Duration:  Time In: 0930  Time Out: 1010  Minutes: Pedro Faith SLP    Visit # Therapist initials Date Arrived NS/ Cx < 24 hr >24 hr Cx Visit Comments   1 BC 11/10 X   Initial Assessment Only Today   2 BC 11/11 X      3 BC 11/15 X       BC 11/22   X No schedule availability    [facility] 11/24   X Clinic holiday closed     12/1-12/15   X COVID-19   4 BC 12/20 X       BC 12/26-30   X SLP out   5 BC 1/3 X       BC 1/10   X Pt not feeling well   6 BC 1/17 X      7 BC 1/24 X      8 BC 1/31 X      9 BC 2/7 X   Discharge                                                  Abbreviations: NS = No Show; CX = cancelled

## 2023-02-07 ENCOUNTER — HOSPITAL ENCOUNTER (OUTPATIENT)
Dept: PHYSICAL THERAPY | Age: 53
Setting detail: RECURRING SERIES
Discharge: HOME | End: 2023-02-10
Payer: COMMERCIAL

## 2023-02-07 PROCEDURE — 97129 THER IVNTJ 1ST 15 MIN: CPT

## 2023-02-07 PROCEDURE — 97130 THER IVNTJ EA ADDL 15 MIN: CPT

## 2023-02-07 NOTE — THERAPY DISCHARGE
Clarisse Glez  : 1970  Primary: Nika Hernandez  Secondary:  Israel Landry  [address] [facility] [address]  Phone: [phone]  Fax: [phone]    Visit Info:    Effective Dates  11/10/2022 TO 2023   Frequency/Duration   No further speech indicated at this time. SPEECH LANGUAGE PATHOLOGY: COMMUNICATION    Discharge 2023     Appt Desk   Episode        Treatment Diagnosis: R41.841 Cognitive-Communication Deficit  Medical/Referring Diagnosis:  No admission diagnoses are documented for this encounter. Referring Physician:  Mackey Aase, APRN - NP MD Orders:  Stephon Velasquez and Treat   Return MD Appt:  No current f/u scheduled  Date of Onset:  Progressive since   Allergies:  Amlodipine, Lisinopril, Olmesartan, Paroxetine hcl, Hydrochlorothiazide, Rosuvastatin, Eggs or egg-derived products, Indapamide, Semaglutide, Tramadol, Butorphanol, Cephalexin, Penicillins, and Ticagrelor  Medications Last Reviewed:  2023     SUBJECTIVE    History of Injury/Illness (Reason for Referral):  Patient with primary c/o memory loss, cognitive changes, including difficulty with multi-tasking. Patient being seen at this clinic for dizziness, vestibular changes. Patient reports having her 4th heart attack  and feels she has not bounced back as much as she previously has. She states she has not been able to return to work. Reports hx of long haul COVID in 2020 and has noticed changes progressively since that time. Patient Stated Goal(s):  Improve attention and memory recall.      Past Medical History/Comorbidities:   Ms. Reid Colindres  has a past medical history of Asthma, CAD (coronary artery disease), Depression with anxiety, Diabetes ([address] Difficult intubation, GERD (gastroesophageal reflux disease), Hypertension, Nausea & vomiting, Obesity (BMI 30-39.9), Panic attacks, Prediabetes, Psychiatric disorder, Thromboembolus ([address] Unspecified adverse effect of anesthesia, and Unspecified sleep apnea. Ms. Bertha Gleason  has a past surgical history that includes us guided core breast biopsy (Bilateral, 1993, 1995, 2000); pr unlisted procedure cardiac surgery; Septoplasty; Total abdominal hysterectomy w/ bilateral salpingoophorectomy (11/7/14); Cholecystectomy; Tonsillectomy (2006); Carpal tunnel release (Bilateral); Cardiac procedure (N/A, 7/25/2022); and Cardiac procedure (N/A, 7/25/2022). Current Communication Status:  Functional expressive communication  Follows Directions: Complex    Previous Speech Therapy:None reported or located in chart. Prior Level of Functioning:  Lives independently with spouse. 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  assists in managing financial at home. OBJECTIVE    Tests Given: [place]'s Cognitive Examination-ACE III; Patient was assessed using the [facility] [place] Cognitive Examination. This examination assesses memory, attention, fluency, language and visual spatial skills. Results were as follows: Attention: 17/18  Memory: 25/26  Fluency: 7/14  Language: 26/26  Visuospatial: 16/16  Total Score: 91/100  Cut-off score criterion for total score is 88. Patient's score is within functional limits    ASSESSMENT    Discharge Summary (2/7/2023): Patient attended 9 speech therapy sessions to address cognitive-communication skills. Patient increased her performance in assessment from overall score of 85 to 91. Patient's score is within functional limits. Patient has met short term goals related to cognitive function and memory. Patient states she does notice deficits with memory and attention, as this has been present for several years; however, she now feels she can use compensatory strategies and aids to assist with areas of need. Discharge today. Patient in agreement. Initial Assessment (11/10/2022): Patient presents with mild to moderately impaired cognitive-communicative skills.  Patient exhibits deficits in areas of sustained, selective, and divided attention, immediate and delayed memory recall, and functional calculations. Visuospatial skills remain intact. Expressive language is functional. Patient currently requires assistance from spouse/family members for higher level ADLs in home/community setting. Speech therapy services are required to address aforementioned difficulties related to cognitive-linguistic skills, as patient is currently below previous level of function. Problem List: (Impacting functional limitations):    Cognitive-communicative skills  Memory recall  Attention  Therapy Prognosis:   Good  RECOMMENDATIONS   Recommendations:    No further speech therapy indicated at this time. PLAN    Interventions Planned (Treatment may consist of any combination of the following):    Cognitive linguistic based treatment, Training in compensatory strategies, and Education  Goals: (Goals have been discussed and agreed upon with patient.)  Short-Term Functional Goals: Time Frame: 12 weeks  Patient will perform multiple step sequencing planning tasks based in daily activities with 80% accuracy with min verbal cues. MET 2/7/23  Patient will perform prospective memory tasks after a 15-30 minute interval using external aid (example, clock). MET 2/7/23  Patient will recall relevant verbal information with >80% accuracy with min cues. MET 2/7/23  Patient will plan and prioritize daily tasks with 90% accuracy given no cueing. MET 2/7/23  Patient will complete functional short-term memory tasks with 85% accuracy with minimal assistance. MET 2/7/23  Patient will complete functional calculations with 85% accuracy with minimal assistance. MET 2/7/23  Patient will utilize memory compensatory strategies to improve recall of functional information with 80% accuracy given min assistance.    MET 2/7/23  Discharge Goals: Time Frame: 12 weeks  Patient will improve neuro-linguistic abilities to participate in a functional living [facility] 2/7/23       Outcome Measure:   ATTENTION: Level 6: The individual maintains attention within complex activities, and can attend simultaneously to multiple demands with rare minimal cues. The individual usually uses compensatory strategies when encountering difficulty. The individual has mild difficulty or takes more than a reasonable amount of time to attend to multiple tasks/stimuli. MEMORY: Level 6:  The individual is able to recall or use external aids/memory strategies for complex information and planning complex future events most of the time. When there is a breakdown in the use of recall/memory strategies/external memory aids, the individual occasionally requires minimal cues. These breakdowns may occasionally interfere with the individual's functioning in vocational, avocational and social activities. Initial: (Date: 11/10/2022)    Medical Necessity/Other Comments:   No further speech therapy indicated at this time. Total Duration:  Time In: 0930  Time Out: 1010  Minutes: 36    Regarding Ale Luu's therapy, I certify that the treatment plan above will be carried out by a therapist or under their direction.   Thank you for this referral,  Idalmis Pop, SLP     Referring Physician Signature: Lazarus Parson, APRN - BRE _______________________________ Date _____________

## 2023-02-14 ENCOUNTER — OFFICE VISIT (OUTPATIENT)
Dept: CARDIOLOGY CLINIC | Age: 53
End: 2023-02-14
Payer: COMMERCIAL

## 2023-02-14 VITALS
SYSTOLIC BLOOD PRESSURE: 120 MMHG | HEIGHT: 67 IN | DIASTOLIC BLOOD PRESSURE: 70 MMHG | WEIGHT: 189.2 LBS | HEART RATE: 56 BPM | BODY MASS INDEX: 29.7 KG/M2

## 2023-02-14 DIAGNOSIS — E11.9 TYPE 2 DIABETES MELLITUS WITHOUT COMPLICATION, WITHOUT LONG-TERM CURRENT USE OF INSULIN (HCC): ICD-10-CM

## 2023-02-14 DIAGNOSIS — E78.5 DYSLIPIDEMIA: ICD-10-CM

## 2023-02-14 DIAGNOSIS — G47.33 OSA (OBSTRUCTIVE SLEEP APNEA): ICD-10-CM

## 2023-02-14 DIAGNOSIS — I10 ESSENTIAL HYPERTENSION WITH GOAL BLOOD PRESSURE LESS THAN 140/90: ICD-10-CM

## 2023-02-14 DIAGNOSIS — I25.10 CORONARY ARTERY DISEASE INVOLVING NATIVE CORONARY ARTERY OF NATIVE HEART WITHOUT ANGINA PECTORIS: Primary | ICD-10-CM

## 2023-02-14 PROCEDURE — 3074F SYST BP LT 130 MM HG: CPT | Performed by: INTERNAL MEDICINE

## 2023-02-14 PROCEDURE — 99214 OFFICE O/P EST MOD 30 MIN: CPT | Performed by: INTERNAL MEDICINE

## 2023-02-14 PROCEDURE — 3078F DIAST BP <80 MM HG: CPT | Performed by: INTERNAL MEDICINE

## 2023-02-14 NOTE — PROGRESS NOTES
[address], [address] [place] [address], [address] [place] [address]  PHONE: [phone]    SUBJECTIVE: Priya Patel (1970) is a 47 y.o. female seen for a follow up visit regarding the following:   Specialty Problems          Cardiology Problems    NSTEMI (non-ST elevated myocardial infarction) ([facility]        Essential hypertension with goal blood pressure less than 140/90        STEMI (ST elevation myocardial infarction) ([facility]        Acute inferior myocardial infarction ([facility]        CAD (coronary artery disease)         Patient presented to emergency room with chest pain and nausea 7/23/22 and was admitted with a diagnosis of NSTEMI. Catheratization was performed showing 99% blockage of mLAD between 2 previously placed stents. Stent was placed. Patient is not taking statins due to intolerance. Patient reports compliance with her medications. Past Medical History, Past Surgical History, Family history, Social History, and Medications were all reviewed with the patient today and updated as necessary. Patient serendipitously was found to have multiple nodules on a CAT scan she has a follow-up with pulmonary later this month. 9/20/22  Patient presents today from a cardiac standpoint appears to be stable and doing well. She though is having positional vertigo. I have suggested online exercises that may help with this    2/14/23  Pt doing well. No chest pain. No palpitations. Patient denies syncope. No dyspnea. States they are taking meds. Maintains a normal level of activity for them without symptoms. No dizziness or lightheadedness. All above conditions stable. Has obstructive sleep apnea and has limited compliance he does have what appears to be polycythemia has polycythemia      Patient is cleared from a cardiac standpoint to donate blood to address her elevated hemoglobin. She is medically safe to donate even if her hemoglobin is above 20.   She is safe to donate sooner than the 60-day waiting period          Allergies   Allergen Reactions    Amlodipine      Other reaction(s): Headache-Intolerance    Lisinopril      Other reaction(s): Headache-Intolerance    Olmesartan      Other reaction(s): Nausea and/or vomiting-Intolerance    Paroxetine Hcl      Other reaction(s): Headache-Intolerance    Hydrochlorothiazide      Other reaction(s): Other- (not listed) - Allergy  Couldn't see after taking, \"wool over eyes\"    Rosuvastatin Other (See Comments)    Eggs Or Egg-Derived Products     Indapamide      Other reaction(s): Unknown-Unspecified    Semaglutide      Other reaction(s): Nausea and/or vomiting-Intolerance    Tramadol Swelling    Butorphanol Palpitations    Cephalexin Rash and Swelling    Penicillins Rash    Ticagrelor Nausea And Vomiting     Past Medical History:   Diagnosis Date    Asthma     seasonal    CAD (coronary artery disease)     Depression with anxiety     Diabetes (HCC)     checks QD, last A1c 6.5, hyposymptoms at 80 , normal 100    Difficult intubation     during hysterectomy pt states small throat , request glidescope    GERD (gastroesophageal reflux disease)     Hypertension     Nausea & vomiting     Obesity (BMI 30-39.9) 34    Panic attacks     Prediabetes     bs: 140's. diet controlled    Psychiatric disorder     ANXIETY    Thromboembolus ([facility]     2014- ? ? Pulm Embolism after hysterectomy    Unspecified adverse effect of anesthesia     \"my heart stopped and quit breathing after hysterectomy on the way to PACU 11/7/14    Unspecified sleep apnea     uses cpap.       Past Surgical History:   Procedure Laterality Date    CARDIAC PROCEDURE N/A 7/25/2022    LEFT HEART CATH / CORONARY ANGIOGRAPHY performed by Melissa Duvall MD at [facility] CATH LAB    CARDIAC PROCEDURE N/A 7/25/2022    Percutaneous coronary intervention performed by Melissa Duvall MD at [address] [place] [address] CATH -     SEPTOPLASTY      x 2 from [facility]    NADER AND BSO (CERVIX REMOVED)  11/7/14    TONSILLECTOMY  2006    US GUIDED CORE BREAST BIOPSY Bilateral 1993, 1995, 2000     Family History   Problem Relation Age of Onset    Heart Disease Mother     Diabetes Mother     Hypertension Mother       Social History     Tobacco Use    Smoking status: Never    Smokeless tobacco: Never   Substance Use Topics    Alcohol use: Not Currently       Current Outpatient Medications:     budesonide-formoterol (SYMBICORT) 80-4.5 MCG/ACT AERO, Inhale 2 puffs into the lungs 2 times daily, Disp: 1 each, Rfl: 11    colchicine (COLCRYS) 0.6 MG tablet, TAKE 1 TABLET BY MOUTH EVERY DAY IN THE MORNING, Disp: 90 tablet, Rfl: 1    ranolazine (RANEXA) 500 MG extended release tablet, TAKE 1 TABLET BY MOUTH TWO TIMES A DAY., Disp: 180 tablet, Rfl: 3    clopidogrel (PLAVIX) 75 MG tablet, TAKE 1 TABLET BY MOUTH EVERY DAY, Disp: 90 tablet, Rfl: 3    buPROPion (WELLBUTRIN XL) 150 MG extended release tablet, TAKE 1 TABLET BY MOUTH EVERY DAY IN THE MORNING FOR 30 DAYS, Disp: , Rfl:     Evolocumab (REPATHA SURECLICK) 009 MG/ML SOAJ, Inject 140 mg into the skin every 14 days, Disp: 2 pen, Rfl: 11    DULoxetine (CYMBALTA) 60 MG extended release capsule, Take 60 mg by mouth in the morning., Disp: , Rfl:     Dulaglutide (TRULICITY) 6.11 YN/4.7SY SOPN, Inject 0.75 mg into the skin Q 10-12 days, Disp: , Rfl:     aspirin 81 MG EC tablet, Take 81 mg by mouth every evening, Disp: , Rfl:     cetirizine (ZYRTEC) 10 MG tablet, Take 10 mg by mouth, Disp: , Rfl:     chlorthalidone (HYGROTON) 25 MG tablet, Take 25 mg by mouth every evening, Disp: , Rfl:     Cholecalciferol 100 MCG (4000 UT) CAPS, Take 8,000 Units by mouth, Disp: , Rfl:     empagliflozin (JARDIANCE) 25 MG tablet, Take 25 mg by mouth daily, Disp: , Rfl:     fluticasone (FLONASE) 50 MCG/ACT nasal spray, 2 sprays by Nasal route as needed, Disp: , Rfl:     guaiFENesin (MUCINEX MAXIMUM STRENGTH) 1200 MG TB12, Take 1,200 mg by mouth as needed, Disp: , Rfl:     Insulin Glargine, 2 Unit Dial, (TOUJEO MAX SOLOSTAR) 300 UNIT/ML SOPN, INJECT 10 15 UNITS UNDER THE SKIN DAILY, Disp: , Rfl:     LORazepam (ATIVAN) 0.5 MG tablet, Take 0.5 mg by mouth every 4 hours as needed. , Disp: , Rfl:     losartan (COZAAR) 50 MG tablet, Take 50 mg by mouth daily, Disp: , Rfl:     metoprolol succinate (TOPROL XL) 50 MG extended release tablet, Take 50 mg by mouth daily, Disp: , Rfl:     niacin 500 MG tablet, Take 500 mg by mouth every morning (before breakfast), Disp: , Rfl:     nitroGLYCERIN (NITROSTAT) 0.4 MG SL tablet, Place 0.4 mg under the tongue, Disp: , Rfl:     ondansetron (ZOFRAN-ODT) 4 MG disintegrating tablet, Take 4 mg by mouth 3 times daily as needed, Disp: , Rfl:     potassium chloride (KLOR-CON M) 20 MEQ extended release tablet, Take 20 mEq by mouth daily, Disp: , Rfl:     levalbuterol (XOPENEX HFA) 45 MCG/ACT inhaler, Inhale 2 puffs into the lungs every 4 hours as needed for Wheezing Albuterol inhaler or nebulizer 4 times daily as needed.  (Patient not taking: Reported on 2/14/2023), Disp: 1 each, Rfl: 11    fluticasone-vilanterol (BREO ELLIPTA) 100-25 MCG/INH AEPB inhaler, Inhale 1 puff into the lungs daily 1 inhalation every morning, rinse mouth after use (Patient not taking: Reported on 2/14/2023), Disp: 1 each, Rfl: 11    azithromycin (ZITHROMAX) 250 MG tablet, TAKE 2 TABS ON DAY 1, THEN 1 TAB A DAY FOR 4 DAYS (Patient not taking: Reported on 2/14/2023), Disp: 6 tablet, Rfl: 0    HUMALOG KWIKPEN 100 UNIT/ML SOPN, INJECT 3-8 UNITS UNDER THE SKIN 3 (THREE) TIMES A DAY WITH MEALS (Patient not taking: Reported on 2/14/2023), Disp: , Rfl:     Heparin Sod, Porcine, in D5W (HEPARIN, PORCINE,) 100 UNIT/ML SOLN infusion, Infuse intravenously continuous (Patient not taking: No sig reported), Disp: , Rfl:     hydrOXYzine HCl (ATARAX) 10 MG tablet, TAKE ONE TABLET BY MOUTH AT BEDTIME AS NEEDED FOR SLEEP (Patient not taking: Reported on 2/14/2023), Disp: , Rfl:     levalbuterol (XOPENEX) 0.63 MG/3ML nebulization, 1 vial via nebulizer q 6 hours prn wheezing and shortness of breath (Patient not taking: Reported on 2/14/2023), Disp: , Rfl:     ROS:  Review of Systems - General ROS: negative for - chills, fatigue or fever  Hematological and Lymphatic ROS: negative for - bleeding problems, bruising or jaundice  Respiratory ROS: no cough, shortness of breath, or wheezing  Cardiovascular ROS: no chest pain or dyspnea on exertion  Gastrointestinal ROS: no abdominal pain, change in bowel habits, or black or bloody stools  Neurological ROS: no TIA or stroke symptoms  All other systems negative.     Wt Readings from Last 3 Encounters:   02/14/23 189 lb 3.2 oz (85.8 kg)   11/09/22 189 lb (85.7 kg)   10/03/22 193 lb (87.5 kg)     Temp Readings from Last 3 Encounters:   11/09/22 98.6 °F (37 °C)   09/15/22 97.3 °F (36.3 °C)   08/10/22 98 °F (36.7 °C) (Temporal)     BP Readings from Last 3 Encounters:   02/14/23 120/70   11/09/22 (!) 129/94   09/20/22 118/74     Pulse Readings from Last 3 Encounters:   02/14/23 56   11/09/22 (!) 110   09/20/22 92       PHYSICAL EXAM:  /70   Pulse 56   Ht 5' 7\" (1.702 m)   Wt 189 lb 3.2 oz (85.8 kg)   BMI 29.63 kg/m²   Physical Examination: General appearance - alert, well appearing, and in no distress  Mental status - alert, oriented to person, place, and time  Eyes - pupils equal and reactive, extraocular eye movements intact  Neck/lymph - supple, no significant adenopathy  Chest/lungs - clear to auscultation, no wheezes, rales or rhonchi, symmetric air entry  Heart/CV - normal rate and regular rhythm  Abdomen/GI - soft, nontender, nondistended, no masses or organomegaly  Musculoskeletal - no joint tenderness, deformity or swelling  Extremities - peripheral pulses normal, no pedal edema, no clubbing or cyanosis  Skin - normal coloration and turgor, no rashes, no suspicious skin lesions noted    EKG:         Medications reviewed and questions answered    No results found for this or any previous visit (from the past 672 hour(s)). Lab Results   Component Value Date/Time    CHOL 145 07/25/2022 06:48 AM    HDL 26 07/25/2022 06:48 AM       ASSESSMENT and PLAN  No follow-up provider specified. is for   Specialty Problems          Cardiology Problems    NSTEMI (non-ST elevated myocardial infarction) ([facility] [address]        Essential hypertension with goal blood pressure less than 140/90        STEMI (ST elevation myocardial infarction) ([facility] [address]        Acute inferior myocardial infarction ([facility] [address]        CAD (coronary artery disease)          HTN:  Controlled, continue medications. MI:  Take Repatha as directed. Continue discharge medications. Consider increasing metoprolol to lower heart rate. CAD:  Take Repatha as directed. A1C was elevated at 8.1 on 7/25/22. Follow up with endocrinology/PCP for better glucose control. PLAN:  Problem List Items Addressed This Visit          Circulatory    Essential hypertension with goal blood pressure less than 140/90    CAD (coronary artery disease) - Primary       Endocrine    Type 2 diabetes mellitus without complication (HCC)       Respiratory    LUCERO (obstructive sleep apnea)       Other    Dyslipidemia      Patient with known history of CAD and multiple caths and stents in the past she presented recently with a non-STEMI and had an LAD stented. She is on appropriate guideline directed medical therapy but with her recent presentation as a non-STEMI we will add colchicine 0.6 mg daily    Patient is starting cardiac rehab soon. Her vertigo has interfered with her starting up to this point. Hopefully appropriate vertigo exercises will mitigate this.   Follow-up in 3 months    Continue meds as below    Current Outpatient Medications:     budesonide-formoterol (SYMBICORT) 80-4.5 MCG/ACT AERO, Inhale 2 puffs into the lungs 2 times daily, Disp: 1 each, Rfl: 11    colchicine (COLCRYS) 0.6 MG tablet, TAKE 1 TABLET BY MOUTH EVERY DAY IN THE MORNING, Disp: 90 tablet, Rfl: 1    ranolazine (RANEXA) 500 MG extended release tablet, TAKE 1 TABLET BY MOUTH TWO TIMES A DAY., Disp: 180 tablet, Rfl: 3    clopidogrel (PLAVIX) 75 MG tablet, TAKE 1 TABLET BY MOUTH EVERY DAY, Disp: 90 tablet, Rfl: 3    buPROPion (WELLBUTRIN XL) 150 MG extended release tablet, TAKE 1 TABLET BY MOUTH EVERY DAY IN THE MORNING FOR 30 DAYS, Disp: , Rfl:     Evolocumab (REPATHA SURECLICK) 631 MG/ML SOAJ, Inject 140 mg into the skin every 14 days, Disp: 2 pen, Rfl: 11    DULoxetine (CYMBALTA) 60 MG extended release capsule, Take 60 mg by mouth in the morning., Disp: , Rfl:     Dulaglutide (TRULICITY) 3.48 RD/0.5LZ SOPN, Inject 0.75 mg into the skin Q 10-12 days, Disp: , Rfl:     aspirin 81 MG EC tablet, Take 81 mg by mouth every evening, Disp: , Rfl:     cetirizine (ZYRTEC) 10 MG tablet, Take 10 mg by mouth, Disp: , Rfl:     chlorthalidone (HYGROTON) 25 MG tablet, Take 25 mg by mouth every evening, Disp: , Rfl:     Cholecalciferol 100 MCG (4000 UT) CAPS, Take 8,000 Units by mouth, Disp: , Rfl:     empagliflozin (JARDIANCE) 25 MG tablet, Take 25 mg by mouth daily, Disp: , Rfl:     fluticasone (FLONASE) 50 MCG/ACT nasal spray, 2 sprays by Nasal route as needed, Disp: , Rfl:     guaiFENesin (MUCINEX MAXIMUM STRENGTH) 1200 MG TB12, Take 1,200 mg by mouth as needed, Disp: , Rfl:     Insulin Glargine, 2 Unit Dial, (TOUJEO MAX SOLOSTAR) 300 UNIT/ML SOPN, INJECT 10 15 UNITS UNDER THE SKIN DAILY, Disp: , Rfl:     LORazepam (ATIVAN) 0.5 MG tablet, Take 0.5 mg by mouth every 4 hours as needed. , Disp: , Rfl:     losartan (COZAAR) 50 MG tablet, Take 50 mg by mouth daily, Disp: , Rfl:     metoprolol succinate (TOPROL XL) 50 MG extended release tablet, Take 50 mg by mouth daily, Disp: , Rfl:     niacin 500 MG tablet, Take 500 mg by mouth every morning (before breakfast), Disp: , Rfl:     nitroGLYCERIN (NITROSTAT) 0.4 MG SL tablet, Place 0.4 mg under the tongue, Disp: , Rfl:     ondansetron (ZOFRAN-ODT) 4 MG disintegrating tablet, Take 4 mg by mouth 3 times daily as needed, Disp: , Rfl:     potassium chloride (KLOR-CON M) 20 MEQ extended release tablet, Take 20 mEq by mouth daily, Disp: , Rfl:     levalbuterol (XOPENEX HFA) 45 MCG/ACT inhaler, Inhale 2 puffs into the lungs every 4 hours as needed for Wheezing Albuterol inhaler or nebulizer 4 times daily as needed. (Patient not taking: Reported on 2/14/2023), Disp: 1 each, Rfl: 11    fluticasone-vilanterol (BREO ELLIPTA) 100-25 MCG/INH AEPB inhaler, Inhale 1 puff into the lungs daily 1 inhalation every morning, rinse mouth after use (Patient not taking: Reported on 2/14/2023), Disp: 1 each, Rfl: 11    azithromycin (ZITHROMAX) 250 MG tablet, TAKE 2 TABS ON DAY 1, THEN 1 TAB A DAY FOR 4 DAYS (Patient not taking: Reported on 2/14/2023), Disp: 6 tablet, Rfl: 0    HUMALOG KWIKPEN 100 UNIT/ML SOPN, INJECT 3-8 UNITS UNDER THE SKIN 3 (THREE) TIMES A DAY WITH MEALS (Patient not taking: Reported on 2/14/2023), Disp: , Rfl:     Heparin Sod, Porcine, in D5W (HEPARIN, PORCINE,) 100 UNIT/ML SOLN infusion, Infuse intravenously continuous (Patient not taking: No sig reported), Disp: , Rfl:     hydrOXYzine HCl (ATARAX) 10 MG tablet, TAKE ONE TABLET BY MOUTH AT BEDTIME AS NEEDED FOR SLEEP (Patient not taking: Reported on 2/14/2023), Disp: , Rfl:     levalbuterol (XOPENEX) 0.63 MG/3ML nebulization, 1 vial via nebulizer q 6 hours prn wheezing and shortness of breath (Patient not taking: Reported on 2/14/2023), Disp: , Rfl:     Alex Noriega MD  2/14/2023  3:44 PM    Pt is instructed to follow all appropriate cardiac risk factor recommendations and to be compliant with meds and testing. Instructed to follow up appropriately and seek urgent medical care if acute or unstable issues arise.  Results of some tests may be viewed thru [address] but this does not substitute for follow up with MD. If follow up is not scheduled pt is instructed to call for follow up

## 2023-02-14 NOTE — Clinical Note
[facility] CARDIOLOGY  [address] [place] [address] [address] [address]  Phone: [phone]  Fax: [phone]    Rah Barlow MD    February 14, 2023     None None  No address on file    Patient: Brandon Finn   MR Number: [id number]   YOB: 1970   Date of Visit: 2/14/2023       Dear None None:    Thank you for referring Linda Evans to me for evaluation/treatment. Below are the relevant portions of my assessment and plan of care. If you have questions, please do not hesitate to call me. I look forward to following Frank Page along with you.     Sincerely,      Rah Barlow MD

## 2023-02-14 NOTE — LETTER
[facility] CARDIOLOGY  [address] [place] [address] [address] [address]  Phone: [phone]  Fax: [phone]    Molly Varela MD        February 14, 2023     Patient: Isabelle White   YOB: 1970   Date of Visit: 2/14/2023       To Whom It May Concern:    Patient is cleared from a cardiac standpoint to donate blood to address her elevated hemoglobin. She is medically safe to donate even if her hemoglobin is above 20.   She is safe to donate sooner than the 60-day waiting period        Molly Varela MD

## 2023-02-21 ENCOUNTER — TELEPHONE (OUTPATIENT)
Dept: CARDIOLOGY CLINIC | Age: 53
End: 2023-02-21

## 2023-02-21 NOTE — TELEPHONE ENCOUNTER
Dr. Jassi Meadows, I pended the Breo to the patient's preferred pharmacy.   Please sign off if appropriate. // Dimitry Harrell

## 2023-02-21 NOTE — TELEPHONE ENCOUNTER
Patient states she does not like the symbicort and she is asking to go back to the [facility]

## 2023-02-22 RX ORDER — FLUTICASONE FUROATE AND VILANTEROL 100; 25 UG/1; UG/1
1 POWDER RESPIRATORY (INHALATION) DAILY
Qty: 1 EACH | Refills: 11 | Status: SHIPPED | OUTPATIENT
Start: 2023-02-22

## 2023-02-22 NOTE — TELEPHONE ENCOUNTER
An order has to be put in for her to give blood   Hospital and doctors on [facility]        ASAP       Hemoglobin is 20,2   Please call

## 2023-02-23 NOTE — TELEPHONE ENCOUNTER
Called pt and she is requesting form to be filled out on [facility] website stating she is able to give blood. States Dr. Ming Baugh gave letter but they need us to fill out a form. I told pt I will call the [facility] to find the form.

## 2023-02-24 ENCOUNTER — TELEPHONE (OUTPATIENT)
Dept: CARDIOLOGY CLINIC | Age: 53
End: 2023-02-24

## 2023-02-24 NOTE — TELEPHONE ENCOUNTER
Spoke to pt and received information to fill out form on [facility] website. Will submit form today. Pt voiced understanding.

## 2023-02-24 NOTE — TELEPHONE ENCOUNTER
Patient called and said she needs an order sent to [facility] and it has to go through their Website. Patient wants to be called so she can explain at [phone].

## 2023-05-16 ENCOUNTER — OFFICE VISIT (OUTPATIENT)
Age: 53
End: 2023-05-16
Payer: COMMERCIAL

## 2023-05-16 VITALS
SYSTOLIC BLOOD PRESSURE: 126 MMHG | BODY MASS INDEX: 29.98 KG/M2 | WEIGHT: 191 LBS | DIASTOLIC BLOOD PRESSURE: 80 MMHG | HEIGHT: 67 IN

## 2023-05-16 DIAGNOSIS — I25.10 CORONARY ARTERY DISEASE INVOLVING NATIVE CORONARY ARTERY OF NATIVE HEART WITHOUT ANGINA PECTORIS: ICD-10-CM

## 2023-05-16 DIAGNOSIS — I10 ESSENTIAL HYPERTENSION WITH GOAL BLOOD PRESSURE LESS THAN 140/90: ICD-10-CM

## 2023-05-16 DIAGNOSIS — G47.33 OSA (OBSTRUCTIVE SLEEP APNEA): ICD-10-CM

## 2023-05-16 DIAGNOSIS — E78.5 DYSLIPIDEMIA: ICD-10-CM

## 2023-05-16 DIAGNOSIS — I21.4 NSTEMI (NON-ST ELEVATED MYOCARDIAL INFARCTION) (HCC): Primary | ICD-10-CM

## 2023-05-16 DIAGNOSIS — E11.9 TYPE 2 DIABETES MELLITUS WITHOUT COMPLICATION, WITHOUT LONG-TERM CURRENT USE OF INSULIN (HCC): ICD-10-CM

## 2023-05-16 PROCEDURE — 93000 ELECTROCARDIOGRAM COMPLETE: CPT | Performed by: INTERNAL MEDICINE

## 2023-05-16 PROCEDURE — 3074F SYST BP LT 130 MM HG: CPT | Performed by: INTERNAL MEDICINE

## 2023-05-16 PROCEDURE — 99214 OFFICE O/P EST MOD 30 MIN: CPT | Performed by: INTERNAL MEDICINE

## 2023-05-16 PROCEDURE — 3079F DIAST BP 80-89 MM HG: CPT | Performed by: INTERNAL MEDICINE

## 2023-05-16 RX ORDER — COLCHICINE 0.6 MG/1
0.6 TABLET ORAL EVERY MORNING
Qty: 90 TABLET | Refills: 3 | Status: SHIPPED | OUTPATIENT
Start: 2023-05-16

## 2023-05-16 RX ORDER — FLUCONAZOLE 200 MG/1
TABLET ORAL
COMMUNITY
Start: 2023-04-04

## 2023-05-16 RX ORDER — TIRZEPATIDE 2.5 MG/.5ML
INJECTION, SOLUTION SUBCUTANEOUS
COMMUNITY
Start: 2023-03-28

## 2023-05-16 RX ORDER — RANOLAZINE 500 MG/1
500 TABLET, EXTENDED RELEASE ORAL 2 TIMES DAILY
Qty: 180 TABLET | Refills: 3 | Status: SHIPPED | OUTPATIENT
Start: 2023-05-16

## 2023-05-16 NOTE — PROGRESS NOTES
we will add colchicine 0.6 mg daily    Patient is starting cardiac rehab soon. Her vertigo has interfered with her starting up to this point. Hopefully appropriate vertigo exercises will mitigate this. Follow-up in 3 months    Continue meds as below    Current Outpatient Medications:     fluticasone furoate-vilanterol (BREO ELLIPTA) 100-25 MCG/ACT inhaler, Inhale 1 puff into the lungs daily, Disp: 1 each, Rfl: 11    budesonide-formoterol (SYMBICORT) 80-4.5 MCG/ACT AERO, Inhale 2 puffs into the lungs 2 times daily, Disp: 1 each, Rfl: 11    colchicine (COLCRYS) 0.6 MG tablet, TAKE 1 TABLET BY MOUTH EVERY DAY IN THE MORNING, Disp: 90 tablet, Rfl: 1    ranolazine (RANEXA) 500 MG extended release tablet, TAKE 1 TABLET BY MOUTH TWO TIMES A DAY., Disp: 180 tablet, Rfl: 3    clopidogrel (PLAVIX) 75 MG tablet, TAKE 1 TABLET BY MOUTH EVERY DAY, Disp: 90 tablet, Rfl: 3    levalbuterol (XOPENEX HFA) 45 MCG/ACT inhaler, Inhale 2 puffs into the lungs every 4 hours as needed for Wheezing Albuterol inhaler or nebulizer 4 times daily as needed.  (Patient not taking: Reported on 2/14/2023), Disp: 1 each, Rfl: 11    fluticasone-vilanterol (BREO ELLIPTA) 100-25 MCG/INH AEPB inhaler, Inhale 1 puff into the lungs daily 1 inhalation every morning, rinse mouth after use (Patient not taking: Reported on 2/14/2023), Disp: 1 each, Rfl: 11    azithromycin (ZITHROMAX) 250 MG tablet, TAKE 2 TABS ON DAY 1, THEN 1 TAB A DAY FOR 4 DAYS (Patient not taking: Reported on 2/14/2023), Disp: 6 tablet, Rfl: 0    HUMALOG KWIKPEN 100 UNIT/ML SOPN, INJECT 3-8 UNITS UNDER THE SKIN 3 (THREE) TIMES A DAY WITH MEALS (Patient not taking: Reported on 2/14/2023), Disp: , Rfl:     buPROPion (WELLBUTRIN XL) 150 MG extended release tablet, TAKE 1 TABLET BY MOUTH EVERY DAY IN THE MORNING FOR 30 DAYS, Disp: , Rfl:     Heparin Sod, Porcine, in D5W (HEPARIN, PORCINE,) 100 UNIT/ML SOLN infusion, Infuse intravenously continuous (Patient not taking: No sig reported), Disp:

## 2023-05-25 ENCOUNTER — HOSPITAL ENCOUNTER (OUTPATIENT)
Dept: CT IMAGING | Age: 53
Discharge: HOME | End: 2023-05-25
Payer: COMMERCIAL

## 2023-05-25 DIAGNOSIS — R91.8 LUNG NODULES: ICD-10-CM

## 2023-05-25 PROCEDURE — 71250 CT THORAX DX C-: CPT

## 2023-05-30 ENCOUNTER — TELEPHONE (OUTPATIENT)
Dept: PULMONOLOGY | Age: 53
End: 2023-05-30

## 2023-05-30 DIAGNOSIS — R91.8 LUNG NODULES: Primary | ICD-10-CM

## 2023-05-30 NOTE — RESULT ENCOUNTER NOTE
Reviewed for Dr. Libby Pop. Please let patient know that nodule is stable. Would recommended follow up CT of chest without contrast in 6 months.

## 2023-06-27 ENCOUNTER — APPOINTMENT (OUTPATIENT)
Dept: GENERAL RADIOLOGY | Age: 53
End: 2023-06-27
Payer: COMMERCIAL

## 2023-06-27 ENCOUNTER — HOSPITAL ENCOUNTER (EMERGENCY)
Age: 53
Discharge: HOME | End: 2023-06-27
Attending: EMERGENCY MEDICINE
Payer: COMMERCIAL

## 2023-06-27 ENCOUNTER — TELEPHONE (OUTPATIENT)
Age: 53
End: 2023-06-27

## 2023-06-27 VITALS
DIASTOLIC BLOOD PRESSURE: 72 MMHG | HEIGHT: 67 IN | RESPIRATION RATE: 17 BRPM | HEART RATE: 92 BPM | OXYGEN SATURATION: 95 % | BODY MASS INDEX: 29.98 KG/M2 | SYSTOLIC BLOOD PRESSURE: 105 MMHG | TEMPERATURE: 99 F | WEIGHT: 191 LBS

## 2023-06-27 DIAGNOSIS — R07.89 ATYPICAL CHEST PAIN: Primary | ICD-10-CM

## 2023-06-27 DIAGNOSIS — E87.6 HYPOKALEMIA: ICD-10-CM

## 2023-06-27 LAB
ALBUMIN SERPL-MCNC: 3.3 G/DL (ref 3.5–5)
ALBUMIN/GLOB SERPL: 0.8 (ref 0.4–1.6)
ALP SERPL-CCNC: 139 U/L (ref 50–136)
ALT SERPL-CCNC: 60 U/L (ref 12–65)
ANION GAP SERPL CALC-SCNC: 6 MMOL/L (ref 2–11)
AST SERPL-CCNC: 51 U/L (ref 15–37)
BASOPHILS # BLD: 0.1 K/UL (ref 0–0.2)
BASOPHILS NFR BLD: 1 % (ref 0–2)
BILIRUB SERPL-MCNC: 1.2 MG/DL (ref 0.2–1.1)
BUN SERPL-MCNC: 17 MG/DL (ref 6–23)
CALCIUM SERPL-MCNC: 8.8 MG/DL (ref 8.3–10.4)
CHLORIDE SERPL-SCNC: 97 MMOL/L (ref 101–110)
CO2 SERPL-SCNC: 27 MMOL/L (ref 21–32)
CREAT SERPL-MCNC: 0.9 MG/DL (ref 0.6–1)
D DIMER PPP FEU-MCNC: 0.33 UG/ML(FEU)
DIFFERENTIAL METHOD BLD: ABNORMAL
EKG ATRIAL RATE: 128 BPM
EKG DIAGNOSIS: NORMAL
EKG P-R INTERVAL: 104 MS
EKG Q-T INTERVAL: 406 MS
EKG QRS DURATION: 102 MS
EKG QTC CALCULATION (BAZETT): 592 MS
EKG R AXIS: 36 DEGREES
EKG T AXIS: 69 DEGREES
EKG VENTRICULAR RATE: 128 BPM
EOSINOPHIL # BLD: 0.3 K/UL (ref 0–0.8)
EOSINOPHIL NFR BLD: 2 % (ref 0.5–7.8)
ERYTHROCYTE [DISTWIDTH] IN BLOOD BY AUTOMATED COUNT: 12.8 % (ref 11.9–14.6)
GLOBULIN SER CALC-MCNC: 4.2 G/DL (ref 2.8–4.5)
GLUCOSE SERPL-MCNC: 169 MG/DL (ref 65–100)
HCT VFR BLD AUTO: 54.5 % (ref 35.8–46.3)
HGB BLD-MCNC: 19.1 G/DL (ref 11.7–15.4)
IMM GRANULOCYTES # BLD AUTO: 0.1 K/UL (ref 0–0.5)
IMM GRANULOCYTES NFR BLD AUTO: 1 % (ref 0–5)
LYMPHOCYTES # BLD: 2.3 K/UL (ref 0.5–4.6)
LYMPHOCYTES NFR BLD: 15 % (ref 13–44)
MAGNESIUM SERPL-MCNC: 1.6 MG/DL (ref 1.8–2.4)
MCH RBC QN AUTO: 29.2 PG (ref 26.1–32.9)
MCHC RBC AUTO-ENTMCNC: 35 G/DL (ref 31.4–35)
MCV RBC AUTO: 83.5 FL (ref 82–102)
MONOCYTES # BLD: 1.3 K/UL (ref 0.1–1.3)
MONOCYTES NFR BLD: 8 % (ref 4–12)
NEUTS SEG # BLD: 11.8 K/UL (ref 1.7–8.2)
NEUTS SEG NFR BLD: 73 % (ref 43–78)
NRBC # BLD: 0 K/UL (ref 0–0.2)
PLATELET # BLD AUTO: 213 K/UL (ref 150–450)
PMV BLD AUTO: 12.2 FL (ref 9.4–12.3)
POTASSIUM SERPL-SCNC: 2.8 MMOL/L (ref 3.5–5.1)
PROT SERPL-MCNC: 7.5 G/DL (ref 6.3–8.2)
RBC # BLD AUTO: 6.53 M/UL (ref 4.05–5.2)
SODIUM SERPL-SCNC: 130 MMOL/L (ref 133–143)
TROPONIN I SERPL HS-MCNC: 5.4 PG/ML (ref 0–14)
TROPONIN I SERPL HS-MCNC: 6.3 PG/ML (ref 0–14)
WBC # BLD AUTO: 16 K/UL (ref 4.3–11.1)

## 2023-06-27 PROCEDURE — 80053 COMPREHEN METABOLIC PANEL: CPT

## 2023-06-27 PROCEDURE — 71045 X-RAY EXAM CHEST 1 VIEW: CPT

## 2023-06-27 PROCEDURE — 93010 ELECTROCARDIOGRAM REPORT: CPT | Performed by: INTERNAL MEDICINE

## 2023-06-27 PROCEDURE — 2500000003 HC RX 250 WO HCPCS: Performed by: EMERGENCY MEDICINE

## 2023-06-27 PROCEDURE — 83735 ASSAY OF MAGNESIUM: CPT

## 2023-06-27 PROCEDURE — 85025 COMPLETE CBC W/AUTO DIFF WBC: CPT

## 2023-06-27 PROCEDURE — 96374 THER/PROPH/DIAG INJ IV PUSH: CPT

## 2023-06-27 PROCEDURE — 99285 EMERGENCY DEPT VISIT HI MDM: CPT

## 2023-06-27 PROCEDURE — 85379 FIBRIN DEGRADATION QUANT: CPT

## 2023-06-27 PROCEDURE — 2580000003 HC RX 258: Performed by: EMERGENCY MEDICINE

## 2023-06-27 PROCEDURE — 93005 ELECTROCARDIOGRAM TRACING: CPT | Performed by: EMERGENCY MEDICINE

## 2023-06-27 PROCEDURE — 96361 HYDRATE IV INFUSION ADD-ON: CPT

## 2023-06-27 PROCEDURE — 84484 ASSAY OF TROPONIN QUANT: CPT

## 2023-06-27 PROCEDURE — 6370000000 HC RX 637 (ALT 250 FOR IP): Performed by: EMERGENCY MEDICINE

## 2023-06-27 RX ORDER — HYDROMORPHONE HYDROCHLORIDE 1 MG/ML
0.4 INJECTION, SOLUTION INTRAMUSCULAR; INTRAVENOUS; SUBCUTANEOUS
Status: COMPLETED | OUTPATIENT
Start: 2023-06-27 | End: 2023-06-27

## 2023-06-27 RX ORDER — PANTOPRAZOLE SODIUM 40 MG/1
40 TABLET, DELAYED RELEASE ORAL
Status: DISCONTINUED | OUTPATIENT
Start: 2023-06-28 | End: 2023-06-27 | Stop reason: HOSPADM

## 2023-06-27 RX ORDER — 0.9 % SODIUM CHLORIDE 0.9 %
1000 INTRAVENOUS SOLUTION INTRAVENOUS ONCE
Status: COMPLETED | OUTPATIENT
Start: 2023-06-27 | End: 2023-06-27

## 2023-06-27 RX ORDER — SUCRALFATE 1 G/1
1 TABLET ORAL
Status: COMPLETED | OUTPATIENT
Start: 2023-06-27 | End: 2023-06-27

## 2023-06-27 RX ADMIN — SODIUM CHLORIDE 1000 ML: 9 INJECTION, SOLUTION INTRAVENOUS at 13:11

## 2023-06-27 RX ADMIN — SUCRALFATE 1 G: 1 TABLET ORAL at 16:52

## 2023-06-27 RX ADMIN — HYDROMORPHONE HYDROCHLORIDE 0.4 MG: 1 INJECTION, SOLUTION INTRAMUSCULAR; INTRAVENOUS; SUBCUTANEOUS at 14:11

## 2023-06-27 RX ADMIN — POTASSIUM BICARBONATE 40 MEQ: 782 TABLET, EFFERVESCENT ORAL at 14:11

## 2023-06-27 ASSESSMENT — PAIN DESCRIPTION - LOCATION
LOCATION: CHEST
LOCATION: CHEST

## 2023-06-27 ASSESSMENT — LIFESTYLE VARIABLES
HOW OFTEN DO YOU HAVE A DRINK CONTAINING ALCOHOL: MONTHLY OR LESS
HOW MANY STANDARD DRINKS CONTAINING ALCOHOL DO YOU HAVE ON A TYPICAL DAY: 1 OR 2

## 2023-06-27 ASSESSMENT — PAIN SCALES - GENERAL
PAINLEVEL_OUTOF10: 10
PAINLEVEL_OUTOF10: 7

## 2023-06-27 ASSESSMENT — PAIN DESCRIPTION - DESCRIPTORS
DESCRIPTORS: ACHING
DESCRIPTORS: TIGHTNESS;ACHING

## 2023-06-27 ASSESSMENT — PAIN - FUNCTIONAL ASSESSMENT: PAIN_FUNCTIONAL_ASSESSMENT: 0-10

## 2023-06-27 ASSESSMENT — PAIN DESCRIPTION - ORIENTATION: ORIENTATION: MID

## 2023-06-28 LAB
EKG ATRIAL RATE: 91 BPM
EKG DIAGNOSIS: NORMAL
EKG P AXIS: 71 DEGREES
EKG P-R INTERVAL: 150 MS
EKG Q-T INTERVAL: 404 MS
EKG QRS DURATION: 110 MS
EKG QTC CALCULATION (BAZETT): 500 MS
EKG R AXIS: 46 DEGREES
EKG T AXIS: 54 DEGREES
EKG VENTRICULAR RATE: 92 BPM

## 2023-06-28 PROCEDURE — 93010 ELECTROCARDIOGRAM REPORT: CPT | Performed by: INTERNAL MEDICINE

## 2023-08-29 NOTE — Clinical Note
Blood pressure stable  On increase  losartan 100 mg daily  To verify blood pressure cuff accuracy  To keep outpatient BP log, follow-up in 2 weeks for nurse blood pressure visit  counseled on exercise and diet (including DASH diet)  Goal to achieve appropriate BMI  Patient agreed with plan with verbal understanding Sheath was removed in the right radial artery. Site closed by radial band.

## 2023-11-14 ENCOUNTER — OFFICE VISIT (OUTPATIENT)
Age: 53
End: 2023-11-14
Payer: COMMERCIAL

## 2023-11-14 VITALS
SYSTOLIC BLOOD PRESSURE: 118 MMHG | WEIGHT: 195 LBS | DIASTOLIC BLOOD PRESSURE: 70 MMHG | HEART RATE: 75 BPM | BODY MASS INDEX: 30.61 KG/M2 | HEIGHT: 67 IN

## 2023-11-14 DIAGNOSIS — I25.10 CORONARY ARTERY DISEASE INVOLVING NATIVE CORONARY ARTERY OF NATIVE HEART WITHOUT ANGINA PECTORIS: Primary | ICD-10-CM

## 2023-11-14 DIAGNOSIS — I10 ESSENTIAL HYPERTENSION WITH GOAL BLOOD PRESSURE LESS THAN 140/90: ICD-10-CM

## 2023-11-14 DIAGNOSIS — E78.5 DYSLIPIDEMIA: ICD-10-CM

## 2023-11-14 DIAGNOSIS — E11.9 TYPE 2 DIABETES MELLITUS WITHOUT COMPLICATION, WITHOUT LONG-TERM CURRENT USE OF INSULIN (HCC): ICD-10-CM

## 2023-11-14 PROCEDURE — 93000 ELECTROCARDIOGRAM COMPLETE: CPT | Performed by: INTERNAL MEDICINE

## 2023-11-14 PROCEDURE — 99214 OFFICE O/P EST MOD 30 MIN: CPT | Performed by: INTERNAL MEDICINE

## 2023-11-14 PROCEDURE — 3074F SYST BP LT 130 MM HG: CPT | Performed by: INTERNAL MEDICINE

## 2023-11-14 PROCEDURE — 3078F DIAST BP <80 MM HG: CPT | Performed by: INTERNAL MEDICINE

## 2023-11-14 RX ORDER — EVOLOCUMAB 140 MG/ML
140 INJECTION, SOLUTION SUBCUTANEOUS
Qty: 6 ADJUSTABLE DOSE PRE-FILLED PEN SYRINGE | Refills: 3 | Status: SHIPPED | OUTPATIENT
Start: 2023-11-14

## 2023-11-14 RX ORDER — RANOLAZINE 500 MG/1
500 TABLET, EXTENDED RELEASE ORAL 2 TIMES DAILY
Qty: 180 TABLET | Refills: 3 | Status: SHIPPED | OUTPATIENT
Start: 2023-11-14

## 2023-11-14 NOTE — PROGRESS NOTES
NEEDED FOR SLEEP (Patient not taking: Reported on 2/14/2023), Disp: , Rfl:     Evolocumab (REPATHA SURECLICK) 023 MG/ML SOAJ, Inject 140 mg into the skin every 14 days, Disp: 2 pen, Rfl: 11    DULoxetine (CYMBALTA) 60 MG extended release capsule, Take 1 capsule by mouth daily, Disp: , Rfl:     Dulaglutide (TRULICITY) 8.68 GA/0.4RF SOPN, Inject 0.75 mg into the skin Q 10-12 days, Disp: , Rfl:     aspirin 81 MG EC tablet, Take 1 tablet by mouth every evening, Disp: , Rfl:     cetirizine (ZYRTEC) 10 MG tablet, Take 1 tablet by mouth, Disp: , Rfl:     chlorthalidone (HYGROTON) 25 MG tablet, Take 1 tablet by mouth every evening, Disp: , Rfl:     Cholecalciferol 100 MCG (4000 UT) CAPS, Take 8,000 Units by mouth, Disp: , Rfl:     empagliflozin (JARDIANCE) 25 MG tablet, Take 1 tablet by mouth daily, Disp: , Rfl:     fluticasone (FLONASE) 50 MCG/ACT nasal spray, 2 sprays by Nasal route as needed, Disp: , Rfl:     guaiFENesin (MUCINEX MAXIMUM STRENGTH) 1200 MG TB12, Take 1,200 mg by mouth as needed, Disp: , Rfl:     Insulin Glargine, 2 Unit Dial, (TOUJEO MAX SOLOSTAR) 300 UNIT/ML SOPN, INJECT 10 15 UNITS UNDER THE SKIN DAILY, Disp: , Rfl:     levalbuterol (XOPENEX) 0.63 MG/3ML nebulization, 1 vial via nebulizer q 6 hours prn wheezing and shortness of breath, Disp: , Rfl:     LORazepam (ATIVAN) 0.5 MG tablet, Take 1 tablet by mouth every 4 hours as needed. , Disp: , Rfl:     losartan (COZAAR) 50 MG tablet, Take 1 tablet by mouth daily, Disp: , Rfl:     metoprolol succinate (TOPROL XL) 50 MG extended release tablet, Take 1 tablet by mouth daily, Disp: , Rfl:     niacin 500 MG tablet, Take 1 tablet by mouth every morning (before breakfast), Disp: , Rfl:     nitroGLYCERIN (NITROSTAT) 0.4 MG SL tablet, Place 1 tablet under the tongue, Disp: , Rfl:     ondansetron (ZOFRAN-ODT) 4 MG disintegrating tablet, Take 1 tablet by mouth 3 times daily as needed, Disp: , Rfl:     potassium chloride (KLOR-CON M) 20 MEQ extended release tablet,

## 2023-11-22 ENCOUNTER — TELEPHONE (OUTPATIENT)
Dept: PULMONOLOGY | Age: 53
End: 2023-11-22

## 2023-11-22 NOTE — TELEPHONE ENCOUNTER
PA for [facility] sent to CoverMyMeds. Key: [id number]    Information regarding your request  This request has been approved using information available on the patient's profile. CIGPOD:[id number];HFMRNG:[id number]; Review Type:Prior Auth; Coverage Start Date:10/23/2023; Coverage End Date:11/21/2024;

## 2023-12-05 ENCOUNTER — HOSPITAL ENCOUNTER (OUTPATIENT)
Dept: CT IMAGING | Age: 53
Discharge: HOME | End: 2023-12-08
Payer: COMMERCIAL

## 2023-12-05 DIAGNOSIS — R91.8 LUNG NODULES: ICD-10-CM

## 2023-12-05 PROCEDURE — 71250 CT THORAX DX C-: CPT

## 2023-12-07 NOTE — RESULT ENCOUNTER NOTE
Please let patient know that nodules are stable. Needs follow up CT of chest without contrast in 6 months. Thank you.

## 2023-12-08 ENCOUNTER — OFFICE VISIT (OUTPATIENT)
Dept: PULMONOLOGY | Age: 53
End: 2023-12-08
Payer: COMMERCIAL

## 2023-12-08 VITALS
RESPIRATION RATE: 20 BRPM | WEIGHT: 185 LBS | DIASTOLIC BLOOD PRESSURE: 80 MMHG | SYSTOLIC BLOOD PRESSURE: 140 MMHG | OXYGEN SATURATION: 97 % | HEIGHT: 67 IN | TEMPERATURE: 98 F | HEART RATE: 77 BPM | BODY MASS INDEX: 29.03 KG/M2

## 2023-12-08 DIAGNOSIS — J45.40 MODERATE PERSISTENT ASTHMA, UNCOMPLICATED: ICD-10-CM

## 2023-12-08 DIAGNOSIS — G47.33 OBSTRUCTIVE SLEEP APNEA (ADULT) (PEDIATRIC): ICD-10-CM

## 2023-12-08 DIAGNOSIS — D75.1 POLYCYTHEMIA: ICD-10-CM

## 2023-12-08 DIAGNOSIS — L40.50 PSORIATIC ARTHRITIS (HCC): ICD-10-CM

## 2023-12-08 DIAGNOSIS — R91.8 LUNG NODULES: Primary | ICD-10-CM

## 2023-12-08 PROCEDURE — 3077F SYST BP >= 140 MM HG: CPT | Performed by: INTERNAL MEDICINE

## 2023-12-08 PROCEDURE — 99214 OFFICE O/P EST MOD 30 MIN: CPT | Performed by: INTERNAL MEDICINE

## 2023-12-08 PROCEDURE — 3079F DIAST BP 80-89 MM HG: CPT | Performed by: INTERNAL MEDICINE

## 2023-12-08 RX ORDER — LEVALBUTEROL TARTRATE 45 UG/1
1-2 AEROSOL, METERED ORAL EVERY 4 HOURS PRN
Qty: 1 EACH | Refills: 11 | Status: SHIPPED | OUTPATIENT
Start: 2023-12-08

## 2023-12-08 RX ORDER — LEVALBUTEROL INHALATION SOLUTION 1.25 MG/3ML
1 SOLUTION RESPIRATORY (INHALATION) EVERY 8 HOURS PRN
Qty: 180 EACH | Refills: 11 | Status: SHIPPED | OUTPATIENT
Start: 2023-12-08

## 2023-12-08 RX ORDER — FLUTICASONE FUROATE AND VILANTEROL 100; 25 UG/1; UG/1
1 POWDER RESPIRATORY (INHALATION) DAILY
Qty: 1 EACH | Refills: 11 | Status: SHIPPED | OUTPATIENT
Start: 2023-12-08

## 2023-12-08 RX ORDER — LEVALBUTEROL INHALATION SOLUTION 1.25 MG/3ML
1 SOLUTION RESPIRATORY (INHALATION) EVERY 4 HOURS PRN
Qty: 180 ML | Refills: 11 | Status: SHIPPED | OUTPATIENT
Start: 2023-12-08

## 2023-12-08 NOTE — PROGRESS NOTES
[address] Lu[address] Rosita Reid. [facility], [address] [place] [address]  [phone]    Patient Name:  Chance Vallejo      YOB: 1970  Office Visit 12/8/2023    ASSESSMENT AND PLAN:  (Medical Decision Making)    Pt with history of asthma, clinically stable though with recent exacerbation possibly linked to her psoriatic arthritis infusion. Incidentally found to have B pulmonary nodules somewhat suggestive of mets. No history of cancer. These are negative and have been stable for 1.5 years on follow-up. Has psoriatic arthritis which may given a reason for pulmonary nodules. Found to have polycythemia with most recent hemoglobin of 19. Need to make sure that these values are not related to hypoxia. O2 levels are normal during the daytime and she wears CPAP at night. Has not had any sleep study or overnight oximetry at all recently. -refilled breo albuterol.   -Refilled as needed Xopenex inhaler and nebulizer. Not currently having to use frequently. -repeat CT in another 6 months. -OLINDA on CPAP with polycythemia. F/u in 6 months. Diagnoses and all orders for this visit:  Lung nodules  -     CT CHEST WO CONTRAST; Future  Moderate persistent asthma, uncomplicated  Obstructive sleep apnea (adult) (pediatric)  -     Pulse oximetry, overnight  Polycythemia  -     Pulse oximetry, overnight  Psoriatic arthritis ([address])  -     CT CHEST WO CONTRAST; Future  Other orders  -     fluticasone furoate-vilanterol (BREO ELLIPTA) 100-25 MCG/ACT inhaler; Inhale 1 puff into the lungs daily  -     levalbuterol (XOPENEX HFA) 45 MCG/ACT inhaler; Inhale 1-2 puffs into the lungs every 4 hours as needed for Wheezing  -     levalbuterol (XOPENEX) 1.25 MG/3ML nebulizer solution;  Take 3 mLs by nebulization every 4 hours as needed for Wheezing For Pharmacy:  Dispense 180 Vials with 11 Refills  If this patient is Medicare please file under Medicare Part B DX: Bronchitis

## 2023-12-12 ENCOUNTER — TELEPHONE (OUTPATIENT)
Dept: PULMONOLOGY | Age: 53
End: 2023-12-12

## 2023-12-12 DIAGNOSIS — R91.8 LUNG NODULES: Primary | ICD-10-CM

## 2024-02-13 ENCOUNTER — TELEPHONE (OUTPATIENT)
Age: 54
End: 2024-02-13

## 2024-02-13 NOTE — TELEPHONE ENCOUNTER
----- Message from Jasmine Molina MA sent at 2/9/2024 12:59 PM EST -----  Regarding: FW: Medical Clearance Letter for Elective Surgery  Contact: [phone]    ----- Message -----  From: Claudette Luu  Sent: 2/9/2024  12:19 PM EST  To: [facility] Clinical Staff  Subject: Medical Clearance Letter for Elective Surgery    I discussed on my last visit with Dr Michael re elective Plastic Surgery.  He said yes that I was allowed to go off the plavix only for 5 days prior.   Please see attached what is needed.  If this can be done on Tuesday next week, as that is pre-op day.

## 2024-02-23 PROBLEM — I25.2 HISTORY OF ACUTE MYOCARDIAL INFARCTION: Status: ACTIVE | Noted: 2022-07-24

## 2024-02-23 PROBLEM — I21.3 STEMI (ST ELEVATION MYOCARDIAL INFARCTION) (HCC): Status: RESOLVED | Noted: 2019-06-30 | Resolved: 2024-02-23

## 2024-02-23 PROBLEM — I21.19 ACUTE INFERIOR MYOCARDIAL INFARCTION (HCC): Status: RESOLVED | Noted: 2019-07-01 | Resolved: 2024-02-23

## 2024-03-26 ENCOUNTER — TELEPHONE (OUTPATIENT)
Age: 54
End: 2024-03-26

## 2024-03-26 NOTE — TELEPHONE ENCOUNTER
Called and spoke with pt, scheduled her an appt with Dr. Michael to go further into Nuke results and also her disability paperwork.

## 2024-03-26 NOTE — TELEPHONE ENCOUNTER
Pt states her nurse pracitioner wanted her to call Dr. Michael's office and ask him if her cymbalta and wellbutrin could be conflicting with her heart condition. Pt would also like to discuss the results of her nuke.

## 2024-04-15 ENCOUNTER — TELEPHONE (OUTPATIENT)
Age: 54
End: 2024-04-15

## 2024-04-15 NOTE — TELEPHONE ENCOUNTER
Patient called and she was needing to give a pint of blood and the order , and can you do it to   [email or web address]

## 2024-04-16 NOTE — TELEPHONE ENCOUNTER
[facility] calling regarding order for blood. Please send to [email or web address]. Go under doctor and physican / therapeutic service and fill out form. With questions please call [phone] Marily

## 2024-04-16 NOTE — TELEPHONE ENCOUNTER
Put in order for blood to the [facility] per Dr. Fernanda rivera to order, will need to go through pcp for now on. JS

## 2024-05-03 ENCOUNTER — TELEPHONE (OUTPATIENT)
Dept: PULMONOLOGY | Age: 54
End: 2024-05-03

## 2024-05-30 ENCOUNTER — OFFICE VISIT (OUTPATIENT)
Age: 54
End: 2024-05-30
Payer: COMMERCIAL

## 2024-05-30 VITALS
SYSTOLIC BLOOD PRESSURE: 120 MMHG | HEIGHT: 67 IN | DIASTOLIC BLOOD PRESSURE: 88 MMHG | HEART RATE: 94 BPM | BODY MASS INDEX: 29.08 KG/M2 | WEIGHT: 185.3 LBS

## 2024-05-30 DIAGNOSIS — I10 ESSENTIAL HYPERTENSION WITH GOAL BLOOD PRESSURE LESS THAN 140/90: ICD-10-CM

## 2024-05-30 DIAGNOSIS — E11.9 TYPE 2 DIABETES MELLITUS WITHOUT COMPLICATION, WITHOUT LONG-TERM CURRENT USE OF INSULIN (HCC): ICD-10-CM

## 2024-05-30 DIAGNOSIS — E78.5 DYSLIPIDEMIA: ICD-10-CM

## 2024-05-30 DIAGNOSIS — I25.10 CORONARY ARTERY DISEASE INVOLVING NATIVE CORONARY ARTERY OF NATIVE HEART WITHOUT ANGINA PECTORIS: Primary | ICD-10-CM

## 2024-05-30 PROCEDURE — 3079F DIAST BP 80-89 MM HG: CPT | Performed by: INTERNAL MEDICINE

## 2024-05-30 PROCEDURE — 3074F SYST BP LT 130 MM HG: CPT | Performed by: INTERNAL MEDICINE

## 2024-05-30 PROCEDURE — 99214 OFFICE O/P EST MOD 30 MIN: CPT | Performed by: INTERNAL MEDICINE

## 2024-05-30 NOTE — PROGRESS NOTES
[facility] CARDIOLOGY, [address], [address]  [place] [address]  PHONE: [phone]    SUBJECTIVE: /HPI  Claudette Dominique Luu (1970) is a 53 y.o. female seen for a follow up visit regarding the following:   Specialty Problems          Cardiology Problems    Essential hypertension with goal blood pressure less than 140/90        CAD (coronary artery disease)         Pt doing well. No chest pain. No palpitations. Patient denies syncope. No dyspnea. States they are taking meds. Maintains a normal level of activity for them without symptoms. No dizziness or lightheadedness. All above conditions stable.  Patient is on Cymbalta and Wellbutrin these are acceptable medications and a cardiac patient and titrating up her Wellbutrin is reasonable    Past Medical History, Past Surgical History, Family history, Social History, and Medications were all reviewed with the patient today and updated as necessary.    Allergies   Allergen Reactions    Amlodipine      Other reaction(s): Headache-Intolerance    Lisinopril      Other reaction(s): Headache-Intolerance    Olmesartan      Other reaction(s): Nausea and/or vomiting-Intolerance    Paroxetine Hcl      Other reaction(s): Headache-Intolerance    Hydrochlorothiazide      Other reaction(s): Other- (not listed) - Allergy  Couldn't see after taking, \"wool over eyes\"    Rosuvastatin Other (See Comments)    Egg-Derived Products     Indapamide      Other reaction(s): Unknown-Unspecified    Semaglutide      Other reaction(s): Nausea and/or vomiting-Intolerance    Tramadol Swelling    Butorphanol Palpitations    Cephalexin Rash and Swelling    Penicillins Rash    Ticagrelor Nausea And Vomiting     Past Medical History:   Diagnosis Date    Acute inferior myocardial infarction (HCC) 7/1/2019    Asthma     seasonal    CAD (coronary artery disease)     Depression with anxiety     Diabetes ([facility])     checks QD, last A1c 6.5, hyposymptoms at 80 , normal 100    Difficult intubation

## 2024-07-29 ENCOUNTER — HOSPITAL ENCOUNTER (OUTPATIENT)
Dept: PHYSICAL THERAPY | Age: 54
Setting detail: RECURRING SERIES
Discharge: HOME | End: 2024-08-01
Payer: COMMERCIAL

## 2024-07-29 DIAGNOSIS — R26.89 OTHER ABNORMALITIES OF GAIT AND MOBILITY: ICD-10-CM

## 2024-07-29 DIAGNOSIS — R42 DIZZINESS AND GIDDINESS: Primary | ICD-10-CM

## 2024-07-29 PROCEDURE — 97161 PT EVAL LOW COMPLEX 20 MIN: CPT

## 2024-07-29 PROCEDURE — 97112 NEUROMUSCULAR REEDUCATION: CPT

## 2024-07-29 ASSESSMENT — PAIN SCALES - GENERAL: PAINLEVEL_OUTOF10: 0

## 2024-07-29 NOTE — THERAPY EVALUATION
Kykaushika Dominique Luu  : 1970  Primary: [place] [employer]  Secondary:  [facility] @ [address]  [place] [address]  Phone: [phone]  Fax: [phone] Plan Frequency: 1-2 times per week as needed for 30 days    Plan of Care/Certification Expiration Date: 24        Plan of Care/Certification Expiration Date:  Plan of Care/Certification Expiration Date: 24    Frequency/Duration: Plan Frequency: 1-2 times per week as needed for 30 days    Time In/Out:   Time In: 1400  Time Out: 1430      PT Visit Info:    Total # of Visits to Date: 1  Progress Note Counter: 1      Visit Count:  1                OUTPATIENT PHYSICAL THERAPY:             Initial Assessment 2024               Episode (vertigo)         Treatment Diagnosis:     Dizziness and giddiness  Other abnormalities of gait and mobility  Medical/Referring Diagnosis:    Vertigo   Referring Physician:  Referral, Self  MD Orders:  PT Eval and Treat   Return MD Appt:    Date of Onset:  Onset Date: 24     Allergies:  Amlodipine, Lisinopril, Olmesartan, Paroxetine hcl, Hydrochlorothiazide, Rosuvastatin, Egg-derived products, Indapamide, Semaglutide, Tramadol, Butorphanol, Cephalexin, Penicillins, and Ticagrelor  Restrictions/Precautions:    Fall Precautions: due to dizziness      Medications Last Reviewed:  2024     SUBJECTIVE   History of Injury/Illness (Reason for Referral):  This morning leaned back to crack my back and got vertigo. Have been fine since I was here last.    Neck/upper back achy.  Air headed 8/10 with standing.        R BPPV when seen last with PT   Patient Stated Goal(s):  \"To not be dizzy and to balance better\"  Initial Pain Level:      0/10   Post Session Pain Level:     0/10  Past Medical History/Comorbidities:   Ms. Luu  has a past medical history of Acute inferior myocardial infarction (HCC), Asthma, CAD (coronary artery disease),

## 2024-07-29 NOTE — PROGRESS NOTES
[facility]         Charge Capture  Events  [employer] Portal  Appt Desk  Attendance Report     Future Appointments   Date Time Provider Department Center   7/30/2024  8:30 AM Eleanor Morataya APRN - CNP [facility]   12/10/2024 10:15 AM Valerio Michael MD [facility]

## 2024-07-30 ENCOUNTER — OFFICE VISIT (OUTPATIENT)
Dept: PULMONOLOGY | Age: 54
End: 2024-07-30
Payer: COMMERCIAL

## 2024-07-30 ENCOUNTER — TELEPHONE (OUTPATIENT)
Age: 54
End: 2024-07-30

## 2024-07-30 VITALS
RESPIRATION RATE: 18 BRPM | TEMPERATURE: 97.2 F | OXYGEN SATURATION: 98 % | BODY MASS INDEX: 28.44 KG/M2 | HEIGHT: 67 IN | SYSTOLIC BLOOD PRESSURE: 118 MMHG | HEART RATE: 75 BPM | DIASTOLIC BLOOD PRESSURE: 72 MMHG | WEIGHT: 181.2 LBS

## 2024-07-30 DIAGNOSIS — R91.8 MULTIPLE LUNG NODULES ON CT: Primary | ICD-10-CM

## 2024-07-30 DIAGNOSIS — J45.40 MODERATE PERSISTENT ASTHMA, UNCOMPLICATED: ICD-10-CM

## 2024-07-30 DIAGNOSIS — G47.33 OSA (OBSTRUCTIVE SLEEP APNEA): ICD-10-CM

## 2024-07-30 DIAGNOSIS — K21.9 LARYNGOPHARYNGEAL REFLUX (LPR): ICD-10-CM

## 2024-07-30 DIAGNOSIS — E04.1 THYROID NODULE: ICD-10-CM

## 2024-07-30 PROCEDURE — 99215 OFFICE O/P EST HI 40 MIN: CPT | Performed by: NURSE PRACTITIONER

## 2024-07-30 PROCEDURE — 3074F SYST BP LT 130 MM HG: CPT | Performed by: NURSE PRACTITIONER

## 2024-07-30 PROCEDURE — G2211 COMPLEX E/M VISIT ADD ON: HCPCS | Performed by: NURSE PRACTITIONER

## 2024-07-30 PROCEDURE — 3078F DIAST BP <80 MM HG: CPT | Performed by: NURSE PRACTITIONER

## 2024-07-30 RX ORDER — INSULIN ASPART 100 [IU]/ML
INJECTION, SOLUTION INTRAVENOUS; SUBCUTANEOUS
COMMUNITY

## 2024-07-30 RX ORDER — FLUTICASONE FUROATE AND VILANTEROL 100; 25 UG/1; UG/1
1 POWDER RESPIRATORY (INHALATION) DAILY
Qty: 1 EACH | Refills: 11 | Status: SHIPPED | OUTPATIENT
Start: 2024-07-30

## 2024-07-30 RX ORDER — LEVALBUTEROL TARTRATE 45 UG/1
2 AEROSOL, METERED ORAL EVERY 4 HOURS PRN
Qty: 1 EACH | Refills: 11 | Status: SHIPPED | OUTPATIENT
Start: 2024-07-30 | End: 2024-08-01

## 2024-07-30 RX ORDER — FAMOTIDINE 20 MG/1
20 TABLET, FILM COATED ORAL 2 TIMES DAILY
Qty: 60 TABLET | Refills: 5 | Status: SHIPPED | OUTPATIENT
Start: 2024-07-30

## 2024-07-30 RX ORDER — SPIRONOLACTONE 25 MG/1
1 TABLET ORAL DAILY
COMMUNITY

## 2024-07-30 NOTE — TELEPHONE ENCOUNTER
Patient brought LTD forms to the [place] office. She states Dr Michael has had her on LTD since 2022. She states she needs to forms faxed to [employer] by tomorrow 7/31/24.    I informed the patient that I would review the forms with Dr Michael when he returns to the office tomorrow and let her know.

## 2024-07-30 NOTE — PATIENT INSTRUCTIONS
I have ordered PET scan for mid August.  You should receive a call from scheduling within 2-3 business days.  If you do not hear from them, please call [phone] to schedule your test.

## 2024-07-30 NOTE — PROGRESS NOTES
Name:  Claudette Luu  YOB: 1970   MRN: [id number]      Office Visit: 7/30/2024       Assessment & Plan    (Medical Decision Making)    Impression: 53 y.o. female     1. Multiple lung nodules on CT  --multiple nodules, stable on CT, previously PET negative.  Largest nodule is 2.2 cm.  Risk of malignancy is <2%.  Patient is extremely anxious about this.  Radiologist recommended follow up PET scan and we will obtain this in mid August.  If nodules are stable, they will have been stable for 2 years and likely no other work up is needed.  - PET CT SKULL BASE TO MID THIGH; Future    2. Moderate persistent asthma, uncomplicated  --exam normal.  Using Breo daily.  Has daily persistent cough that seems more related to reflux.  - fluticasone furoate-vilanterol (BREO ELLIPTA) 100-25 MCG/ACT inhaler; Inhale 1 puff into the lungs daily Give named brand  Dispense: 1 each; Refill: 11  - levalbuterol (XOPENEX HFA) 45 MCG/ACT inhaler; Inhale 2 puffs into the lungs every 4 hours as needed for Wheezing Albuterol inhaler or nebulizer 4 times daily as needed.  Dispense: 1 each; Refill: 11  - DME - DURABLE MEDICAL EQUIPMENT    3. Thyroid nodule  --needs follow up US in 1 year--has been arranged by ENT.    4. Laryngopharyngeal reflux (LPR)  --persistent cough.  LPR noted on recent ENT work up.  Can't take PPI.  Will place on Pepcid bid.  - famotidine (PEPCID) 20 MG tablet; Take 1 tablet by mouth 2 times daily  Dispense: 60 tablet; Refill: 5    5. LUCERO (obstructive sleep apnea)  --reports  nightly compliance with CPAP.  Continues to have DHS, fatigue.  OLINDA ordered last visit, but not performed.  Will re-order.  May need   - Pulse oximetry, overnight  - DME - DURABLE MEDICAL EQUIPMENT    Orders Placed This Encounter   Medications    fluticasone furoate-vilanterol (BREO ELLIPTA) 100-25 MCG/ACT inhaler     Sig: Inhale 1 puff into the lungs daily Give named brand     Dispense:  1 each     Refill:  11    levalbuterol

## 2024-08-01 RX ORDER — ALBUTEROL SULFATE 90 UG/1
2 AEROSOL, METERED RESPIRATORY (INHALATION) EVERY 4 HOURS PRN
Qty: 1 EACH | Refills: 5 | Status: SHIPPED | OUTPATIENT
Start: 2024-08-01

## 2024-08-01 NOTE — TELEPHONE ENCOUNTER
I spoke to the patient. I told her I reviewed the forms with Dr Michael. I informed her that our physicians aren't trained to do Functional Capacity Testing and that typically comes from a Disability physician.     She is asking if Dr Michael would dictate a letter with her cardiac history and other non cardiac issues and she will find someone else to do the Functional capacity testing.

## 2024-08-15 ENCOUNTER — OFFICE VISIT (OUTPATIENT)
Age: 54
End: 2024-08-15
Payer: COMMERCIAL

## 2024-08-15 VITALS
BODY MASS INDEX: 29.07 KG/M2 | HEIGHT: 67 IN | SYSTOLIC BLOOD PRESSURE: 102 MMHG | WEIGHT: 185.2 LBS | DIASTOLIC BLOOD PRESSURE: 82 MMHG | HEART RATE: 80 BPM

## 2024-08-15 DIAGNOSIS — E11.9 TYPE 2 DIABETES MELLITUS WITHOUT COMPLICATION, WITHOUT LONG-TERM CURRENT USE OF INSULIN (HCC): ICD-10-CM

## 2024-08-15 DIAGNOSIS — I25.10 CORONARY ARTERY DISEASE INVOLVING NATIVE CORONARY ARTERY OF NATIVE HEART WITHOUT ANGINA PECTORIS: Primary | ICD-10-CM

## 2024-08-15 DIAGNOSIS — E78.5 DYSLIPIDEMIA: ICD-10-CM

## 2024-08-15 DIAGNOSIS — G47.33 OSA (OBSTRUCTIVE SLEEP APNEA): ICD-10-CM

## 2024-08-15 PROCEDURE — 3079F DIAST BP 80-89 MM HG: CPT | Performed by: INTERNAL MEDICINE

## 2024-08-15 PROCEDURE — 3074F SYST BP LT 130 MM HG: CPT | Performed by: INTERNAL MEDICINE

## 2024-08-15 PROCEDURE — 99214 OFFICE O/P EST MOD 30 MIN: CPT | Performed by: INTERNAL MEDICINE

## 2024-08-15 RX ORDER — LOSARTAN POTASSIUM 25 MG/1
25 TABLET ORAL DAILY
Qty: 90 TABLET | Refills: 3 | Status: SHIPPED | OUTPATIENT
Start: 2024-08-15

## 2024-08-15 NOTE — PROGRESS NOTES
[facility] [place] CARDIOLOGY, [address], [address]  [place] [address]  PHONE: [phone]    SUBJECTIVE: /HPI  Claudette Dominique Luu (1970) is a 53 y.o. female seen for a follow up visit regarding the following:   Specialty Problems          Cardiology Problems    Essential hypertension with goal blood pressure less than 140/90        CAD (coronary artery disease)         Pt is a 52 yo F with PMH of CAD, HTN, HLD, LUCERO on CPAP and T2DM who presents today for a 3 month follow-up visit. Pt's main complaint is surrounding her chronic fatigue. She states that she has additionally have 2 episodes of low BP, ~79/53, where she was feeling severely fatigued and lightheaded. No other complaints today. Denies chest pain, shortness of breath, palpitations, and edema.     Past Medical History, Past Surgical History, Family history, Social History, and Medications were all reviewed with the patient today and updated as necessary.    Allergies   Allergen Reactions    Amlodipine      Other reaction(s): Headache-Intolerance    Lisinopril      Other reaction(s): Headache-Intolerance    Olmesartan      Other reaction(s): Nausea and/or vomiting-Intolerance    Paroxetine Hcl      Other reaction(s): Headache-Intolerance    Hydrochlorothiazide      Other reaction(s): Other- (not listed) - Allergy  Couldn't see after taking, \"wool over eyes\"    Rosuvastatin Other (See Comments)    Egg-Derived Products     Indapamide      Other reaction(s): Unknown-Unspecified    Semaglutide      Other reaction(s): Nausea and/or vomiting-Intolerance    Tramadol Swelling    Butorphanol Palpitations    Cephalexin Rash and Swelling    Penicillins Rash    Ticagrelor Nausea And Vomiting     Past Medical History:   Diagnosis Date    Acute inferior myocardial infarction (HCC) 7/1/2019    Asthma     seasonal    CAD (coronary artery disease)     Depression with anxiety     Diabetes (HCC)     checks QD, last A1c 6.5, hyposymptoms at 80 , normal 100

## 2024-09-05 NOTE — TELEPHONE ENCOUNTER
Per Dr Michael, we will be happy to send any requested records related to her Cardiac condition but are unable to provide information for noncardiac related conditions.     I have left several messages for the patient to call back if she has any further questions or concerns.

## 2024-11-19 ENCOUNTER — TELEPHONE (OUTPATIENT)
Dept: PULMONOLOGY | Age: 54
End: 2024-11-19

## 2024-11-19 DIAGNOSIS — R91.8 MULTIPLE LUNG NODULES ON CT: Primary | ICD-10-CM

## 2024-11-19 NOTE — TELEPHONE ENCOUNTER
PET Scan scheduled for 11/21/24 is being denied. PEER to PEER information. [phone] Ref #[id number].     After a review of the notes that were sent, the requested PET Scan with CT for Attenuation is denied at this time.  This request for PET Scan with CT for Attenuation was denied because clinical information needed to make a decision was not submitted by your physician. The following clinical notes were requested: doctor's notes that give the findings of the test [facility] is an independent licensee of the [employer]. BCBS--SHP--Med Nec Denial(Chest CT (Computed Tomography)) that was already approved. That test was approved on 6/20/2024 for another provider and was valid until 7/20/2024. If this test was done, we need to know why another test is needed.  [employer] Clinical Guideline 070-1 for PET Scan was used to make this decision.

## 2024-11-20 NOTE — TELEPHONE ENCOUNTER
PET scan is denied since previous PET was negative.  Will change order to CT of chest for ongoing follow up of nodules.  Please notify patient.

## 2024-11-20 NOTE — TELEPHONE ENCOUNTER
Notified PET department that ultimately PET was denied post call from Mrs Morataya.  I have left patient a message to notify on dedicated VM with instruction to call to schedule follow up CT chest. Notified radiology scheduling of insurance denial.

## 2024-11-20 NOTE — TELEPHONE ENCOUNTER
I called regarding PET scan.  This is denied.  Her insurance will only approve one PET scan for nodules unless there is a change.  Her nodules have been stable.  Would recommend CT of chest for follow up of nodules.  If this scan is unchanged, no further follow up is needed.  I have placed order for this--please let patient know.  Thanks.      Total time on call was 16 minutes.

## 2024-11-25 RX ORDER — RANOLAZINE 500 MG/1
500 TABLET, EXTENDED RELEASE ORAL 2 TIMES DAILY
Qty: 180 TABLET | Refills: 3 | Status: SHIPPED | OUTPATIENT
Start: 2024-11-25

## 2024-12-23 RX ORDER — METOPROLOL SUCCINATE 50 MG/1
50 TABLET, EXTENDED RELEASE ORAL DAILY
Qty: 90 TABLET | Refills: 0 | Status: SHIPPED | OUTPATIENT
Start: 2024-12-23

## 2024-12-23 NOTE — TELEPHONE ENCOUNTER
Requested Prescriptions     Pending Prescriptions Disp Refills    metoprolol succinate (TOPROL XL) 50 MG extended release tablet [Pharmacy Med Name: METOPROLOL SUCC ER 50 MG TAB] 90 tablet 0     Sig: TAKE 1 TABLET BY MOUTH EVERY DAY         Verified rx. Last OV 8/15/24. Erx to pharm on file.

## 2025-01-07 ENCOUNTER — HOSPITAL ENCOUNTER (OUTPATIENT)
Dept: PHYSICAL THERAPY | Age: 55
Setting detail: RECURRING SERIES
Discharge: HOME | End: 2025-01-10
Payer: COMMERCIAL

## 2025-01-07 DIAGNOSIS — G89.29 CHRONIC RIGHT SHOULDER PAIN: Primary | ICD-10-CM

## 2025-01-07 DIAGNOSIS — M25.521 PAIN IN RIGHT ELBOW: ICD-10-CM

## 2025-01-07 DIAGNOSIS — M25.511 CHRONIC RIGHT SHOULDER PAIN: Primary | ICD-10-CM

## 2025-01-07 DIAGNOSIS — M25.611 STIFFNESS OF RIGHT SHOULDER, NOT ELSEWHERE CLASSIFIED: ICD-10-CM

## 2025-01-07 PROCEDURE — 97110 THERAPEUTIC EXERCISES: CPT

## 2025-01-07 PROCEDURE — 97162 PT EVAL MOD COMPLEX 30 MIN: CPT

## 2025-01-07 ASSESSMENT — PAIN DESCRIPTION - ORIENTATION: ORIENTATION: RIGHT

## 2025-01-07 ASSESSMENT — PAIN DESCRIPTION - LOCATION: LOCATION: ELBOW;SHOULDER

## 2025-01-07 NOTE — THERAPY EVALUATION
Claudette Luu  : 1970  [place] [employer]  Secondary:  [facility] @ [address]  [place] [address]  Phone: [phone]  Fax: [phone] Plan Frequency: 1-2 times per week for 8-12 weeks    Plan of Care/Certification Expiration Date: 25        Plan of Care/Certification Expiration Date:  Plan of Care/Certification Expiration Date: 25    Frequency/Duration: Plan Frequency: 1-2 times per week for 8-12 weeks      Time In/Out:   Time In: 1115  Time Out: 1200      PT Visit Info:    Progress Note Due Date: 25  Total # of Visits to Date: 1  Progress Note Counter: 1      Visit Count:  1                OUTPATIENT PHYSICAL THERAPY:             Initial Assessment 2025               Episode (R elbow and shoulder)         Treatment Diagnosis:     Chronic right shoulder pain  Stiffness of right shoulder, not elsewhere classified  Pain in right elbow  Medical/Referring Diagnosis:    Pain in right elbow  Adhesive capsulitis of right shoulder     Referring Physician:  Mala Mijares, YANI - NP  MD Orders:  PT Eval and Treat   Return MD Appt:  3 months  Date of Onset:  Onset Date:  (2024)   early 2024  Allergies:  Amlodipine, Lisinopril, Olmesartan, Paroxetine hcl, Hydrochlorothiazide, Rosuvastatin, Egg-derived products, Indapamide, Semaglutide, Tramadol, Butorphanol, Cephalexin, Penicillins, and Ticagrelor  Restrictions/Precautions:    See medical history      Medications Last Reviewed: 2025     SUBJECTIVE   History of Injury/Illness (Reason for Referral):  I have fallen 2 x, landing on R side; during bouts of vertigo.    Pain with getting dressed.   Sensation intact  No pain from shoulder at night.    Xrays were negative at . Primary MD set up PT. Hurts to raise R shoulder and to reach behind and hurts R elbow to move over in bed.   Patient Stated Goal(s):  \"To be able to  things with R

## 2025-01-07 NOTE — PROGRESS NOTES
Claudette Luu  : 1970  [place] [employer]  Secondary:  [facility] @ [address]  [place] [address]  Phone: [phone]  Fax: [phone] Plan Frequency: 1-2 times per week for 8-12 weeks    Plan of Care/Certification Expiration Date: 25        Plan of Care/Certification Expiration Date:  Plan of Care/Certification Expiration Date: 25    Frequency/Duration: Plan Frequency: 1-2 times per week for 8-12 weeks      Time In/Out:   Time In: 1115  Time Out: 1200      PT Visit Info:    Progress Note Due Date: 25  Total # of Visits to Date: 1  Progress Note Counter: 1      Visit Count:  1    OUTPATIENT PHYSICAL THERAPY:   Treatment Note 2025       Episode  (R elbow and shoulder)               Treatment Diagnosis:    Chronic right shoulder pain  Stiffness of right shoulder, not elsewhere classified  Pain in right elbow  Medical/Referring Diagnosis:    Pain in right elbow  Adhesive capsulitis of right shoulder      Referring Physician:  Mala Mijares, APRN - NP  MD Orders:  PT Eval and Treat   Return MD Appt:  3 months   Date of Onset:  Onset Date:  (2024)     Allergies:   Amlodipine, Lisinopril, Olmesartan, Paroxetine hcl, Hydrochlorothiazide, Rosuvastatin, Egg-derived products, Indapamide, Semaglutide, Tramadol, Butorphanol, Cephalexin, Penicillins, and Ticagrelor  Restrictions/Precautions:   See medical history      Interventions Planned (Treatment may consist of any combination of the following):     See Assessment Note    Subjective Comments:   Fell 2 times on R side. No fractures  Initial Pain Level:: Right Elbow, Shoulder  (6-7/10 with AROM R shoulder)/10  Post Session Pain Level:  Right  Elbow, Shoulder 2/10  Medications Last Reviewed: 2025  Updated Objective Findings:  See Evaluation Note from today  Treatment   THERAPEUTIC EXERCISE: (15 minutes):    Exercises per grid below to improve mobility and

## 2025-01-09 ENCOUNTER — HOSPITAL ENCOUNTER (OUTPATIENT)
Dept: PHYSICAL THERAPY | Age: 55
Setting detail: RECURRING SERIES
Discharge: HOME | End: 2025-01-12
Payer: COMMERCIAL

## 2025-01-09 PROCEDURE — 97110 THERAPEUTIC EXERCISES: CPT

## 2025-01-09 PROCEDURE — 97140 MANUAL THERAPY 1/> REGIONS: CPT

## 2025-01-09 ASSESSMENT — PAIN SCALES - GENERAL: PAINLEVEL_OUTOF10: 4

## 2025-01-09 NOTE — PROGRESS NOTES
Claudette Luu  : 1970  [place] [employer]  Secondary:  [facility] @ [address]  [place] [address]  Phone: [phone]  Fax: [phone] Plan Frequency: 1-2 times per week for 8-12 weeks    Plan of Care/Certification Expiration Date: 25        Plan of Care/Certification Expiration Date:  Plan of Care/Certification Expiration Date: 25    Frequency/Duration: Plan Frequency: 1-2 times per week for 8-12 weeks      Time In/Out:   Time In: 0900  Time Out: 0950      PT Visit Info:    Progress Note Due Date: 25  Total # of Visits to Date: 2  Progress Note Counter: 2      Visit Count:  2    OUTPATIENT PHYSICAL THERAPY:   Treatment Note 2025       Episode  (R elbow and shoulder)               Treatment Diagnosis:    Chronic right shoulder pain  Stiffness of right shoulder, not elsewhere classified  Pain in right elbow  Medical/Referring Diagnosis:    Pain in right elbow  Adhesive capsulitis of right shoulder      Referring Physician:  Mala Mijares APRN - NP  MD Orders:  PT Eval and Treat   Return MD Appt:  3 months   Date of Onset:  Onset Date:  (2024)     Allergies:   Amlodipine, Lisinopril, Olmesartan, Paroxetine hcl, Hydrochlorothiazide, Rosuvastatin, Egg-derived products, Indapamide, Semaglutide, Tramadol, Butorphanol, Cephalexin, Penicillins, and Ticagrelor  Restrictions/Precautions:   See medical history      Interventions Planned (Treatment may consist of any combination of the following):     See Assessment Note    Subjective Comments:   Doing okay. Arm is sore. Doing HEP.  Initial Pain Level::     4/10  Post Session Pain Level:       3/10  Medications Last Reviewed: 2025  Updated Objective Findings:  None Today  Treatment   THERAPEUTIC EXERCISE: (25 minutes):    Exercises per grid below to improve mobility and strength.  Required minimal verbal cues to promote proper body alignment, promote

## 2025-01-14 ENCOUNTER — HOSPITAL ENCOUNTER (OUTPATIENT)
Dept: PHYSICAL THERAPY | Age: 55
Setting detail: RECURRING SERIES
Discharge: HOME | End: 2025-01-17
Payer: COMMERCIAL

## 2025-01-14 PROCEDURE — 97110 THERAPEUTIC EXERCISES: CPT

## 2025-01-14 PROCEDURE — 97140 MANUAL THERAPY 1/> REGIONS: CPT

## 2025-01-14 ASSESSMENT — PAIN DESCRIPTION - LOCATION: LOCATION: ELBOW;SHOULDER

## 2025-01-14 ASSESSMENT — PAIN SCALES - GENERAL: PAINLEVEL_OUTOF10: 4

## 2025-01-14 ASSESSMENT — PAIN DESCRIPTION - ORIENTATION: ORIENTATION: RIGHT

## 2025-01-14 NOTE — PROGRESS NOTES
Claudette Luu  : 1970  [place] [employer]  Secondary:  [facility] @ [address]  [place] [address]  Phone: [phone]  Fax: [phone] Plan Frequency: 1-2 times per week for 8-12 weeks    Plan of Care/Certification Expiration Date: 25        Plan of Care/Certification Expiration Date:  Plan of Care/Certification Expiration Date: 25    Frequency/Duration: Plan Frequency: 1-2 times per week for 8-12 weeks      Time In/Out:   Time In: 45  Time Out: 1035      PT Visit Info:    Progress Note Due Date: 25  Total # of Visits to Date: 3  Progress Note Counter: 3      Visit Count:  3    OUTPATIENT PHYSICAL THERAPY:   Treatment Note 2025       Episode  (R elbow and shoulder)               Treatment Diagnosis:    Chronic right shoulder pain  Stiffness of right shoulder, not elsewhere classified  Pain in right elbow  Medical/Referring Diagnosis:    Pain in right elbow  Adhesive capsulitis of right shoulder      Referring Physician:  Mala Mijares APRN - NP  MD Orders:  PT Eval and Treat   Return MD Appt:  3 months   Date of Onset:  Onset Date:  (2024)     Allergies:   Amlodipine, Lisinopril, Olmesartan, Paroxetine hcl, Hydrochlorothiazide, Rosuvastatin, Egg-derived products, Indapamide, Semaglutide, Tramadol, Butorphanol, Cephalexin, Penicillins, and Ticagrelor  Restrictions/Precautions:   See medical history      Interventions Planned (Treatment may consist of any combination of the following):     See Assessment Note    Subjective Comments:   \"I think it's getting better. I can move it more.\"  Initial Pain Level:: Right Elbow, Shoulder 4/10  Post Session Pain Level:  Right  Elbow, Shoulder 3/10  Medications Last Reviewed: 2025  Updated Objective Findings:  None Today  Treatment   THERAPEUTIC EXERCISE: (25 minutes):    Exercises per grid below to improve mobility and strength.  Required minimal verbal

## 2025-01-16 ENCOUNTER — HOSPITAL ENCOUNTER (OUTPATIENT)
Dept: PHYSICAL THERAPY | Age: 55
Setting detail: RECURRING SERIES
End: 2025-01-16
Payer: COMMERCIAL

## 2025-01-21 ENCOUNTER — HOSPITAL ENCOUNTER (OUTPATIENT)
Dept: PHYSICAL THERAPY | Age: 55
Setting detail: RECURRING SERIES
Discharge: HOME | End: 2025-01-24
Payer: COMMERCIAL

## 2025-01-21 PROCEDURE — 97035 APP MDLTY 1+ULTRASOUND EA 15: CPT

## 2025-01-21 PROCEDURE — 97110 THERAPEUTIC EXERCISES: CPT

## 2025-01-21 ASSESSMENT — PAIN DESCRIPTION - ORIENTATION: ORIENTATION: RIGHT

## 2025-01-21 ASSESSMENT — PAIN DESCRIPTION - LOCATION: LOCATION: ELBOW;SHOULDER

## 2025-01-21 ASSESSMENT — PAIN SCALES - GENERAL: PAINLEVEL_OUTOF10: 4

## 2025-01-21 NOTE — PROGRESS NOTES
Claudette Luu  : 1970  [place] [employer]  Secondary:  [facility] @ [address] [place]  [place] [address]  Phone: [phone]  Fax: [phone] Plan Frequency: 1-2 times per week for 8-12 weeks    Plan of Care/Certification Expiration Date: 25        Plan of Care/Certification Expiration Date:  Plan of Care/Certification Expiration Date: 25    Frequency/Duration: Plan Frequency: 1-2 times per week for 8-12 weeks      Time In/Out:   Time In: 45  Time Out: 1035      PT Visit Info:    Progress Note Due Date: 25  Total # of Visits to Date: 4  Progress Note Counter: 4      Visit Count:  4    OUTPATIENT PHYSICAL THERAPY:   Treatment Note 2025       Episode  (R elbow and shoulder)               Treatment Diagnosis:    Chronic right shoulder pain  Stiffness of right shoulder, not elsewhere classified  Pain in right elbow  Medical/Referring Diagnosis:    Pain in right elbow  Adhesive capsulitis of right shoulder      Referring Physician:  Mala Mijares APRN - NP  MD Orders:  PT Eval and Treat   Return MD Appt:  3 months   Date of Onset:  Onset Date:  (2024)     Allergies:   Amlodipine, Lisinopril, Olmesartan, Paroxetine hcl, Hydrochlorothiazide, Rosuvastatin, Egg-derived products, Indapamide, Semaglutide, Tramadol, Butorphanol, Cephalexin, Penicillins, and Ticagrelor  Restrictions/Precautions:   See medical history      Interventions Planned (Treatment may consist of any combination of the following):     See Assessment Note    Subjective Comments:   R arm has been sore.   Initial Pain Level:: Right Elbow, Shoulder 4/10  Post Session Pain Level:  Right  Elbow, Shoulder 2/10  Medications Last Reviewed: 2025  Updated Objective Findings:  None Today  Treatment   THERAPEUTIC EXERCISE: (30 minutes):    Exercises per grid below to improve mobility and strength.  Required minimal verbal cues to promote proper body

## 2025-01-23 ENCOUNTER — HOSPITAL ENCOUNTER (OUTPATIENT)
Dept: PHYSICAL THERAPY | Age: 55
Setting detail: RECURRING SERIES
Discharge: HOME | End: 2025-01-26
Payer: COMMERCIAL

## 2025-01-23 PROCEDURE — 97035 APP MDLTY 1+ULTRASOUND EA 15: CPT

## 2025-01-23 PROCEDURE — 97110 THERAPEUTIC EXERCISES: CPT

## 2025-01-23 ASSESSMENT — PAIN SCALES - GENERAL: PAINLEVEL_OUTOF10: 3

## 2025-01-23 ASSESSMENT — PAIN DESCRIPTION - LOCATION: LOCATION: SHOULDER

## 2025-01-23 ASSESSMENT — PAIN DESCRIPTION - ORIENTATION: ORIENTATION: RIGHT

## 2025-01-23 NOTE — PROGRESS NOTES
Claudette Luu  : 1970  [place] [employer]  Secondary:  [facility] @ [address] [place]  [place] [address]  Phone: [phone]  Fax: [phone] Plan Frequency: 1-2 times per week for 8-12 weeks    Plan of Care/Certification Expiration Date: 25        Plan of Care/Certification Expiration Date:  Plan of Care/Certification Expiration Date: 25    Frequency/Duration: Plan Frequency: 1-2 times per week for 8-12 weeks      Time In/Out:   Time In: 1114  Time Out: 1155      PT Visit Info:    Progress Note Due Date: 25  Total # of Visits to Date: 5  Progress Note Counter: 5      Visit Count:  5    OUTPATIENT PHYSICAL THERAPY:   Treatment Note 2025       Episode  (R elbow and shoulder)               Treatment Diagnosis:    Chronic right shoulder pain  Stiffness of right shoulder, not elsewhere classified  Pain in right elbow  Medical/Referring Diagnosis:    Pain in right elbow  Adhesive capsulitis of right shoulder      Referring Physician:  Mala Mijares APRN - NP  MD Orders:  PT Eval and Treat   Return MD Appt:  3 months   Date of Onset:  Onset Date:  (2024)     Allergies:   Amlodipine, Lisinopril, Olmesartan, Paroxetine hcl, Hydrochlorothiazide, Rosuvastatin, Egg-derived products, Indapamide, Semaglutide, Tramadol, Butorphanol, Cephalexin, Penicillins, and Ticagrelor  Restrictions/Precautions:   See medical history      Interventions Planned (Treatment may consist of any combination of the following):     See Assessment Note    Subjective Comments:   The tape felt good. Can move my arm forward okay but still hurts moving out to the side.   Initial Pain Level:: Right Shoulder 3/10  Post Session Pain Level:  Right  Shoulder 2/10  Medications Last Reviewed: 2025  Updated Objective Findings:  None Today  Treatment   THERAPEUTIC EXERCISE: (31 minutes):    Exercises per grid below to improve mobility and strength.

## 2025-01-28 ENCOUNTER — HOSPITAL ENCOUNTER (OUTPATIENT)
Dept: PHYSICAL THERAPY | Age: 55
Setting detail: RECURRING SERIES
End: 2025-01-28
Payer: COMMERCIAL

## 2025-01-30 ENCOUNTER — HOSPITAL ENCOUNTER (OUTPATIENT)
Dept: PHYSICAL THERAPY | Age: 55
Setting detail: RECURRING SERIES
End: 2025-01-30
Payer: COMMERCIAL

## 2025-01-30 PROCEDURE — 97110 THERAPEUTIC EXERCISES: CPT

## 2025-01-30 PROCEDURE — 97140 MANUAL THERAPY 1/> REGIONS: CPT

## 2025-01-30 ASSESSMENT — PAIN SCALES - GENERAL: PAINLEVEL_OUTOF10: 4

## 2025-01-30 ASSESSMENT — PAIN DESCRIPTION - LOCATION: LOCATION: SHOULDER

## 2025-01-30 ASSESSMENT — PAIN DESCRIPTION - ORIENTATION: ORIENTATION: RIGHT

## 2025-01-30 NOTE — PROGRESS NOTES
Claudette Luu  : 1970  [place] [employer]  Secondary:  [facility] @ [address] [place]  [place] [address]  Phone: [phone]  Fax: [phone] Plan Frequency: 1-2 times per week for 8-12 weeks    Plan of Care/Certification Expiration Date: 25        Plan of Care/Certification Expiration Date:  Plan of Care/Certification Expiration Date: 25    Frequency/Duration: Plan Frequency: 1-2 times per week for 8-12 weeks      Time In/Out:   Time In: 1030  Time Out: 1125      PT Visit Info:    Progress Note Due Date: 25  Total # of Visits to Date: 6  Progress Note Counter: 6      Visit Count:  6    OUTPATIENT PHYSICAL THERAPY:   Treatment Note 2025       Episode  (R elbow and shoulder)               Treatment Diagnosis:    Chronic right shoulder pain  Stiffness of right shoulder, not elsewhere classified  Pain in right elbow  Medical/Referring Diagnosis:    Pain in right elbow  Adhesive capsulitis of right shoulder      Referring Physician:  Mala Mijares APRN - NP  MD Orders:  PT Eval and Treat   Return MD Appt:  3 months   Date of Onset:  Onset Date:  (2024)     Allergies:   Amlodipine, Lisinopril, Olmesartan, Paroxetine hcl, Hydrochlorothiazide, Rosuvastatin, Egg-derived products, Indapamide, Semaglutide, Tramadol, Butorphanol, Cephalexin, Penicillins, and Ticagrelor  Restrictions/Precautions:   See medical history      Interventions Planned (Treatment may consist of any combination of the following):     See Assessment Note    Subjective Comments:   Shoulder was hurting more on Tuesday. Same pain just hurting more.   Initial Pain Level:: Right Shoulder 4/10  Post Session Pain Level:  Right  Shoulder 2/10  Medications Last Reviewed: 2025  Updated Objective Findings:  None Today  Treatment   THERAPEUTIC EXERCISE: (35 minutes):    Exercises per grid below to improve mobility and strength.  Required minimal  verbal cues to promote proper body alignment, promote proper body posture, and promote proper body mechanics.  Progressed resistance, range, repetitions, and complexity of movement as indicated.   Date:  1/30/25 Date:     Activity/Exercise Parameters Parameters   Supine wand ER R shoulder     Seated scapular retraction     Shoulder pulleys flexion R shoulder X 15 reps flexion and abduction    Wall ladder flexion R shoulder     pendulums     rows Red tubing  x 15 reps B    B shoulder extension Red tubing  x 15 reps B    R shoulder ER Yellow tubing x 15 reps ROM as tolerated    R shoulder IR Yellow tubing x 15 reps ROM as tolerated.    Standing R elbow extension Red tubing x 15 reps    R elbow flexion 3# x 15 reps    Supine R shoulder protraction X 10 reps with assist    R forearm pronation/supination 3# x 10 reps    Standing shoulder flexion with wand X 10 reps    Bent over R shoulder horizontal abduction     Bent over R shoulder scaption X 10 reps    kinesiotaping         MANUAL THERAPY: (10 minutes):   Joint mobilization and Soft tissue mobilization was utilized and necessary because of the patient's restricted joint motion and restricted motion of soft tissue. Pt in supine. R shoulder PROM in all planes to improve mobility.       MODALITIES:        *  Cold Pack Therapy in order to provide analgesia and reduce inflammation and edema. Cold pack to R shoulder x 10 minutes at end of session to decrease pain and soreness at end of session. Skin intact after ice.         Treatment/Session Summary:    Treatment Assessment:   Patient tolerated session well with no increase in pain. Patient reported that R shoulder felt looser at end of session.  Communication/Consultation:  None today  Equipment provided today:  None  Recommendations/Intent for next treatment session: Next visit will focus on stretching, strengthening, manual therapy and modalities as needed..    >Total Treatment Billable Duration:  45 minutes + 10

## 2025-02-08 ENCOUNTER — HOSPITAL ENCOUNTER (OUTPATIENT)
Dept: CT IMAGING | Age: 55
Discharge: HOME | End: 2025-02-11
Payer: COMMERCIAL

## 2025-02-08 ENCOUNTER — HOSPITAL ENCOUNTER (OUTPATIENT)
Dept: MRI IMAGING | Age: 55
Discharge: HOME | End: 2025-02-11
Payer: COMMERCIAL

## 2025-02-08 DIAGNOSIS — M75.01 ADHESIVE CAPSULITIS OF RIGHT SHOULDER: ICD-10-CM

## 2025-02-08 DIAGNOSIS — R91.8 MULTIPLE LUNG NODULES ON CT: ICD-10-CM

## 2025-02-08 PROCEDURE — 71250 CT THORAX DX C-: CPT

## 2025-02-08 PROCEDURE — 73221 MRI JOINT UPR EXTREM W/O DYE: CPT

## 2025-02-21 ENCOUNTER — OFFICE VISIT (OUTPATIENT)
Dept: PULMONOLOGY | Age: 55
End: 2025-02-21
Payer: COMMERCIAL

## 2025-02-21 ENCOUNTER — HOSPITAL ENCOUNTER (OUTPATIENT)
Dept: GENERAL RADIOLOGY | Age: 55
Discharge: HOME | End: 2025-02-24
Payer: COMMERCIAL

## 2025-02-21 VITALS
WEIGHT: 190 LBS | HEIGHT: 67 IN | RESPIRATION RATE: 20 BRPM | OXYGEN SATURATION: 99 % | TEMPERATURE: 98 F | DIASTOLIC BLOOD PRESSURE: 80 MMHG | SYSTOLIC BLOOD PRESSURE: 124 MMHG | BODY MASS INDEX: 29.82 KG/M2 | HEART RATE: 92 BPM

## 2025-02-21 DIAGNOSIS — L40.50 PSORIATIC ARTHRITIS (HCC): ICD-10-CM

## 2025-02-21 DIAGNOSIS — K21.9 LARYNGOPHARYNGEAL REFLUX (LPR): ICD-10-CM

## 2025-02-21 DIAGNOSIS — G47.33 OSA (OBSTRUCTIVE SLEEP APNEA): ICD-10-CM

## 2025-02-21 DIAGNOSIS — J45.40 MODERATE PERSISTENT ASTHMA, UNCOMPLICATED: Primary | ICD-10-CM

## 2025-02-21 DIAGNOSIS — R91.8 MULTIPLE LUNG NODULES ON CT: ICD-10-CM

## 2025-02-21 LAB
EXPIRATORY TIME: NORMAL
FEF 25-75% %PRED-PRE: NORMAL
FEF 25-75% PRED: NORMAL
FEF 25-75-PRE: NORMAL
FEV1 %PRED-PRE: 74 %
FEV1 PRED: NORMAL
FEV1/FVC %PRED-PRE: NORMAL
FEV1/FVC PRED: NORMAL
FEV1/FVC: 70 %
FEV1: 2.15 L
FVC %PRED-PRE: 83 %
FVC PRED: NORMAL
FVC: 3.05 L
PEF %PRED-PRE: NORMAL
PEF PRED: NORMAL
PEF-PRE: NORMAL

## 2025-02-21 PROCEDURE — 94010 BREATHING CAPACITY TEST: CPT | Performed by: INTERNAL MEDICINE

## 2025-02-21 PROCEDURE — 73120 X-RAY EXAM OF HAND: CPT

## 2025-02-21 PROCEDURE — 3079F DIAST BP 80-89 MM HG: CPT | Performed by: INTERNAL MEDICINE

## 2025-02-21 PROCEDURE — 3074F SYST BP LT 130 MM HG: CPT | Performed by: INTERNAL MEDICINE

## 2025-02-21 PROCEDURE — 99214 OFFICE O/P EST MOD 30 MIN: CPT | Performed by: INTERNAL MEDICINE

## 2025-02-21 RX ORDER — LEVALBUTEROL INHALATION SOLUTION 1.25 MG/3ML
1 SOLUTION RESPIRATORY (INHALATION) EVERY 8 HOURS PRN
Qty: 180 EACH | Refills: 11 | Status: SHIPPED | OUTPATIENT
Start: 2025-02-21

## 2025-02-21 RX ORDER — FLUTICASONE FUROATE AND VILANTEROL 100; 25 UG/1; UG/1
1 POWDER RESPIRATORY (INHALATION) DAILY
Qty: 1 EACH | Refills: 11 | Status: CANCELLED | OUTPATIENT
Start: 2025-02-21

## 2025-02-21 RX ORDER — FLUTICASONE FUROATE AND VILANTEROL TRIFENATATE 200; 25 UG/1; UG/1
1 POWDER RESPIRATORY (INHALATION) DAILY
Qty: 1 EACH | Refills: 11 | Status: SHIPPED | OUTPATIENT
Start: 2025-02-21

## 2025-02-21 RX ORDER — FAMOTIDINE 20 MG/1
20 TABLET, FILM COATED ORAL 2 TIMES DAILY
Qty: 60 TABLET | Refills: 5 | Status: SHIPPED | OUTPATIENT
Start: 2025-02-21

## 2025-02-21 RX ORDER — LEVALBUTEROL TARTRATE 45 UG/1
1 AEROSOL, METERED ORAL EVERY 4 HOURS PRN
Qty: 1 EACH | Refills: 11 | Status: SHIPPED | OUTPATIENT
Start: 2025-02-21 | End: 2026-02-21

## 2025-02-21 RX ORDER — ALBUTEROL SULFATE 90 UG/1
2 INHALANT RESPIRATORY (INHALATION) EVERY 4 HOURS PRN
Qty: 1 EACH | Refills: 5 | Status: CANCELLED | OUTPATIENT
Start: 2025-02-21

## 2025-02-21 ASSESSMENT — PULMONARY FUNCTION TESTS
FVC: 3.05
FEV1: 2.15
FEV1_PERCENT_PREDICTED_PRE: 74
FVC_PERCENT_PREDICTED_PRE: 83
FEV1/FVC: 70

## 2025-02-21 NOTE — PATIENT INSTRUCTIONS
The company who will be taking care of your Overnight Oximetry is:    Address: [address], [place] [address]  Phone: [phone]   Fax: [phone]

## 2025-02-21 NOTE — PROGRESS NOTES
[facility]  [address]  [place] [address]  [phone]    Patient Name:  Claudette Luu      YOB: 1970  Office Visit 2/21/2025    ASSESSMENT AND PLAN:  (Medical Decision Making)    Pt with history of asthma, some nocturnal symptoms that seem poorly controlled on breo 100.     Incidentally found to have B pulmonary nodules somewhat suggestive of mets. No history of cancer.  These are PET negative and have been stable for 2 years on follow-up.  Reviewed guidelines suggesting no ongoing need for f/u. She is very nervous that these could eventually enlarge and cause problems.     Has psoriatic arthritis which may given a reason for pulmonary nodules.     Found to have polycythemia with most recent hemoglobin of 19.  Need to make sure that these values are not related to hypoxia.  O2 levels are normal during the daytime and she wears CPAP at night.  Has not had any sleep study or overnight oximetry at all recently.    -will increase breo to 200 daily.   -prn Xopenex inhaler and nebulizer.  Has not tolerated albuterol in the past with tachycardia and has h/o CAD, multiple MI's.   -repeat CT in 2 years: Feb 2027  -OLINDA on CPAP with polycythemia.  -with worsening cough laying down at night will try famotidone (cannot take PPI due to plavix interaction)    F/u in 6 months.       Diagnoses and all orders for this visit:  Moderate persistent asthma, uncomplicated  -     Spirometry Without Bronchodilator  -     Pulse oximetry, overnight  Laryngopharyngeal reflux (LPR)  Multiple lung nodules on CT  LUCERO (obstructive sleep apnea)      Fritz Villatoro MD    _________________________________________________________________________    HISTORY OF PRESENT ILLNESS:    Ms. Claudette Luu in our clinic today who presents with a Pulmonary Nodule and Asthma    She has a hsitory of never smoking, asthma, LUCERO on APAP, polycythemia, CAD with h/o 7 stents (most recent June 2022),  radiology results/lab results

## 2025-02-25 NOTE — PROGRESS NOTES
New Patient presents today for a routine gynecological examination with complaints of vaginal dryness and dyspareunia. Denies bleeding with intercourse. Denies discharge, odor or itching. Denies new partners. H/o DM/MI. Poor candidate for estrogen tx. Notes muscles seem very \"tight\" vaginally.   History of hysterectomy in 2016 due to fibroids. Does not have ovaries.    Last pap smear: years ago. Per pt no known history of abnormal pap smears.   Mammogram: 2023 Benign.   Colonoscopy: Has not had screening.   Dexa: 2024 Bone density is considered osteopenia.     OB History          3    Para   2    Term   2            AB   1    Living   2         SAB   1    IAB        Ectopic        Molar        Multiple        Live Births   2              GYN History         No LMP recorded (lmp unknown). Patient has had a hysterectomy.     Past Medical History:  Past Medical History:   Diagnosis Date    Acute inferior myocardial infarction ([facility]) 2019    Asthma     seasonal    CAD (coronary artery disease)     Depression with anxiety     Diabetes ([facility])     checks QD, last A1c 6.5, hyposymptoms at 80 , normal 100    Difficult intubation     during hysterectomy pt states small throat , request glidescope    GERD (gastroesophageal reflux disease)     Hypertension     Lung disease     Nausea & vomiting     NSTEMI (non-ST elevated myocardial infarction) ([facility]) 2022    Obesity (BMI 30-39.9) 34    Panic attacks     Prediabetes     bs: 140's. diet controlled    Psoriatic arthritis ([facility]) 2023    Psychiatric disorder     ANXIETY    STEMI (ST elevation myocardial infarction) ([facility]) 2019    Thromboembolus ([facility])     - ??Pulm Embolism after hysterectomy    Unspecified adverse effect of anesthesia     \"my heart stopped and quit breathing after hysterectomy on the way to PACU 14    Unspecified sleep apnea     uses cpap.        Past Surgical History:  Past Surgical History:   Procedure

## 2025-02-26 ENCOUNTER — TELEPHONE (OUTPATIENT)
Age: 55
End: 2025-02-26

## 2025-02-26 NOTE — TELEPHONE ENCOUNTER
Spoke with pt and she had an isolated event this past Monday night of CP that last approx 3 hours requiring 3 NTG tabs before pain was relieved. No active CP at this time. Also voiced concern of last CT chest that said she has severe coronary plaque. Her f/u is on 3/11/25 but she would like to be seen sooner due to past med hx of CAD. Advised that if pain comes back and she is concerned, she should go to ER. Please advise..

## 2025-02-26 NOTE — TELEPHONE ENCOUNTER
Per Dr. Michael..     If I have office availability you can certainly work her in earlier than her March 11 appointment.  I am in the hospital though for the next 8 days.     Pt scheduled on 3/7 at 10 am in [facility] office. Pt accepted new appt.

## 2025-02-26 NOTE — TELEPHONE ENCOUNTER
Patient saw her results from the pulmonologist and she saw it went from moderate to severe plaque and also Monday night she had an episode that she could not lay down due to pain in her chest and no relief after taking nitroglycerin- so she repeated it 3 times and finally got relief.   Patient is worried, please call back.

## 2025-02-27 ENCOUNTER — OFFICE VISIT (OUTPATIENT)
Dept: OBGYN CLINIC | Age: 55
End: 2025-02-27
Payer: COMMERCIAL

## 2025-02-27 VITALS
BODY MASS INDEX: 30.45 KG/M2 | HEIGHT: 67 IN | DIASTOLIC BLOOD PRESSURE: 64 MMHG | WEIGHT: 194 LBS | SYSTOLIC BLOOD PRESSURE: 116 MMHG

## 2025-02-27 DIAGNOSIS — Z12.72 SCREENING FOR VAGINAL CANCER: ICD-10-CM

## 2025-02-27 DIAGNOSIS — Z13.89 SCREENING FOR GENITOURINARY CONDITION: ICD-10-CM

## 2025-02-27 DIAGNOSIS — Z01.419 WELL WOMAN EXAM: Primary | ICD-10-CM

## 2025-02-27 DIAGNOSIS — Z12.31 VISIT FOR SCREENING MAMMOGRAM: ICD-10-CM

## 2025-02-27 DIAGNOSIS — N94.10 DYSPAREUNIA IN FEMALE: ICD-10-CM

## 2025-02-27 DIAGNOSIS — Z11.51 SCREENING FOR HUMAN PAPILLOMAVIRUS (HPV): ICD-10-CM

## 2025-02-27 LAB
BILIRUBIN, URINE, POC: NORMAL
BLOOD URINE, POC: NEGATIVE
GLUCOSE URINE, POC: NEGATIVE
KETONES, URINE, POC: NORMAL
LEUKOCYTE ESTERASE, URINE, POC: NEGATIVE
NITRITE, URINE, POC: NEGATIVE
PH, URINE, POC: 5.5 (ref 4.6–8)
PROTEIN,URINE, POC: 30
SPECIFIC GRAVITY, URINE, POC: 1.03 (ref 1–1.03)
URINALYSIS CLARITY, POC: CLEAR
URINALYSIS COLOR, POC: NORMAL
UROBILINOGEN, POC: NORMAL MG/DL

## 2025-02-27 PROCEDURE — 99213 OFFICE O/P EST LOW 20 MIN: CPT | Performed by: NURSE PRACTITIONER

## 2025-02-27 PROCEDURE — 3078F DIAST BP <80 MM HG: CPT | Performed by: NURSE PRACTITIONER

## 2025-02-27 PROCEDURE — 81002 URINALYSIS NONAUTO W/O SCOPE: CPT | Performed by: NURSE PRACTITIONER

## 2025-02-27 PROCEDURE — 99459 PELVIC EXAMINATION: CPT | Performed by: NURSE PRACTITIONER

## 2025-02-27 PROCEDURE — 3074F SYST BP LT 130 MM HG: CPT | Performed by: NURSE PRACTITIONER

## 2025-02-27 PROCEDURE — 99386 PREV VISIT NEW AGE 40-64: CPT | Performed by: NURSE PRACTITIONER

## 2025-03-07 ENCOUNTER — OFFICE VISIT (OUTPATIENT)
Age: 55
End: 2025-03-07
Payer: COMMERCIAL

## 2025-03-07 VITALS
HEART RATE: 81 BPM | HEIGHT: 67 IN | DIASTOLIC BLOOD PRESSURE: 76 MMHG | SYSTOLIC BLOOD PRESSURE: 122 MMHG | BODY MASS INDEX: 30.76 KG/M2 | WEIGHT: 196 LBS

## 2025-03-07 DIAGNOSIS — I25.10 CORONARY ARTERY DISEASE INVOLVING NATIVE CORONARY ARTERY OF NATIVE HEART WITHOUT ANGINA PECTORIS: Primary | ICD-10-CM

## 2025-03-07 LAB
COLLECTION METHOD: NORMAL
CYTOLOGIST CVX/VAG CYTO: NORMAL
CYTOLOGY CVX/VAG DOC THIN PREP: NORMAL
DATE OF LMP: NORMAL
HPV APTIMA: NEGATIVE
Lab: NORMAL
PAP SOURCE: NORMAL
PATH REPORT.FINAL DX SPEC: NORMAL
PREV TREATMENT: NORMAL
STAT OF ADQ CVX/VAG CYTO-IMP: NORMAL

## 2025-03-07 PROCEDURE — 3078F DIAST BP <80 MM HG: CPT | Performed by: INTERNAL MEDICINE

## 2025-03-07 PROCEDURE — 93000 ELECTROCARDIOGRAM COMPLETE: CPT | Performed by: INTERNAL MEDICINE

## 2025-03-07 PROCEDURE — 99215 OFFICE O/P EST HI 40 MIN: CPT | Performed by: INTERNAL MEDICINE

## 2025-03-07 PROCEDURE — 3074F SYST BP LT 130 MM HG: CPT | Performed by: INTERNAL MEDICINE

## 2025-03-07 RX ORDER — SEMAGLUTIDE 0.68 MG/ML
INJECTION, SOLUTION SUBCUTANEOUS
Qty: 3 ML | Refills: 2 | Status: SHIPPED | OUTPATIENT
Start: 2025-03-07 | End: 2025-05-05

## 2025-03-07 NOTE — PROGRESS NOTES
[facility] CARDIOLOGY, [address], [address]  [place] [address]  PHONE: [phone]    SUBJECTIVE: /HPI  Claudette Luu (1970) is a 54 y.o. female seen for a follow up visit regarding the following:   Specialty Problems          Cardiology Problems    Essential hypertension with goal blood pressure less than 140/90        CAD (coronary artery disease)         Pt with hx of HTN, CAD presents for acute chest pains. Last heart cath 2022 with severe mid LAD stenosis between 2 previously placed stents, diffuse small vessel CAD of apical LAD, normal LCX. Pain had started at midnight midsternal. Took 3 NTG tabs with pain relief a few weeks ago. No SOB, syncope, edema. Compliant with all medications.     Past Medical History, Past Surgical History, Family history, Social History, and Medications were all reviewed with the patient today and updated as necessary.    Allergies   Allergen Reactions    Amlodipine Headaches    Lisinopril      Other reaction(s): Headache-Intolerance    Olmesartan      Other reaction(s): Nausea and/or vomiting-Intolerance    Paroxetine Hcl      Other reaction(s): Headache-Intolerance    Egg-Derived Products     Hydrochlorothiazide      Other reaction(s): Other- (not listed) - Allergy  Couldn't see after taking, \"wool over eyes\"    Indapamide      Other reaction(s): Unknown-Unspecified    Rosuvastatin Other (See Comments)    Semaglutide Nausea And Vomiting    Tramadol Swelling    Butorphanol Palpitations    Cephalexin Rash and Swelling    Penicillins Rash    Ticagrelor Nausea And Vomiting     Past Medical History:   Diagnosis Date    Acute inferior myocardial infarction (HCC) 07/01/2019    Asthma     seasonal    CAD (coronary artery disease)     Depression with anxiety     Diabetes (HCC)     checks QD, last A1c 6.5, hyposymptoms at 80 , normal 100    Difficult intubation     during hysterectomy pt states small throat , request glidescope    GERD (gastroesophageal reflux

## 2025-03-11 NOTE — TELEPHONE ENCOUNTER
Pt is calling asking us to call [facility] about her Ozempic RX that we sent in  They need to speak with someone from our office please call   [facility] [phone]

## 2025-03-12 RX ORDER — SEMAGLUTIDE 0.68 MG/ML
0.25 INJECTION, SOLUTION SUBCUTANEOUS
Qty: 3 ML | Refills: 0 | Status: SHIPPED | OUTPATIENT
Start: 2025-03-12 | End: 2025-04-11

## 2025-03-27 ENCOUNTER — HOSPITAL ENCOUNTER (OUTPATIENT)
Dept: PHYSICAL THERAPY | Age: 55
Setting detail: RECURRING SERIES
End: 2025-03-27
Payer: COMMERCIAL

## 2025-03-27 ENCOUNTER — HOSPITAL ENCOUNTER (OUTPATIENT)
Dept: PHYSICAL THERAPY | Age: 55
Setting detail: RECURRING SERIES
Discharge: HOME | End: 2025-03-30
Payer: COMMERCIAL

## 2025-03-27 DIAGNOSIS — R42 DIZZINESS AND GIDDINESS: Primary | ICD-10-CM

## 2025-03-27 DIAGNOSIS — H81.11 BENIGN PAROXYSMAL VERTIGO, RIGHT EAR: ICD-10-CM

## 2025-03-27 PROCEDURE — 97112 NEUROMUSCULAR REEDUCATION: CPT

## 2025-03-27 PROCEDURE — 97161 PT EVAL LOW COMPLEX 20 MIN: CPT

## 2025-03-27 ASSESSMENT — PAIN SCALES - GENERAL: PAINLEVEL_OUTOF10: 0

## 2025-03-27 NOTE — THERAPY EVALUATION
Claudette Luu  : 1970  [place] ([place] [facility])  Secondary:  [facility] @ [address]  [place] [address]  Phone: [phone]  Fax: [phone] Plan Frequency: 1-2 times per week for 30 days as needed    Plan of Care/Certification Expiration Date: 25        Plan of Care/Certification Expiration Date:  Plan of Care/Certification Expiration Date: 25    Frequency/Duration: Plan Frequency: 1-2 times per week for 30 days as needed      Time In/Out:   Time In: 0815  Time Out: 0850      PT Visit Info:    Progress Note Due Date: 25  Total # of Visits to Date: 1  Progress Note Counter: 1      Visit Count:  1                OUTPATIENT PHYSICAL THERAPY:             Initial Assessment 3/27/2025               Episode (PT: Vertigo)         Treatment Diagnosis:     Dizziness and giddiness  Benign paroxysmal vertigo, right ear  Medical/Referring Diagnosis:    Benign paroxysmal vertigo     Referring Physician:  Referral, Self  MD Orders:  self referred  Return MD Appt:  N/A  Date of Onset:  Onset Date:  (last week per patient)     Allergies:  Amlodipine, Lisinopril, Olmesartan, Paroxetine hcl, Egg-derived products, Hydrochlorothiazide, Indapamide, Rosuvastatin, Semaglutide, Tramadol, Butorphanol, Cephalexin, Penicillins, and Ticagrelor  Restrictions/Precautions:    None      Medications Last Reviewed: 3/27/2025     SUBJECTIVE   History of Injury/Illness (Reason for Referral):  About a week ago vertigo started, worse if look R. Roll over in bed get nauseated  No falls.  Patient Stated Goal(s):  \"To not be dizzy\"  Initial Pain Level:      0/10   Post Session Pain Level:     0/10  Past Medical History/Comorbidities:   Ms. Luu  has a past medical history of Acute inferior myocardial infarction (HCC), Asthma, CAD (coronary artery disease), Depression with anxiety, Diabetes (HCC), Difficult intubation, GERD (gastroesophageal reflux

## 2025-03-27 NOTE — PROGRESS NOTES
Claudette Luu  : 1970  [place] [employer]  Secondary:  [facility] @ [address] [place]  [place] [address]  Phone: [phone]  Fax: [phone] Plan Frequency: 1-2 times per week for 30 days as needed    Plan of Care/Certification Expiration Date: 25        Plan of Care/Certification Expiration Date:  Plan of Care/Certification Expiration Date: 25    Frequency/Duration: Plan Frequency: 1-2 times per week for 30 days as needed      Time In/Out:   Time In: 0815  Time Out: 0850      PT Visit Info:    Progress Note Due Date: 25  Total # of Visits to Date: 1  Progress Note Counter: 1      Visit Count:  1    OUTPATIENT PHYSICAL THERAPY:   Treatment Note 3/27/2025       Episode  (PT: Vertigo)               Treatment Diagnosis:    Dizziness and giddiness  Benign paroxysmal vertigo, right ear  Medical/Referring Diagnosis:    Benign paroxysmal vertigo    Referring Physician:  Referral, Self  MD Orders:  self-referred  Return MD Appt:  N/A   Date of Onset:  Onset Date:  (last week per patient)     Allergies:   Amlodipine, Lisinopril, Olmesartan, Paroxetine hcl, Egg-derived products, Hydrochlorothiazide, Indapamide, Rosuvastatin, Semaglutide, Tramadol, Butorphanol, Cephalexin, Penicillins, and Ticagrelor  Restrictions/Precautions:   None      Interventions Planned (Treatment may consist of any combination of the following):     See Assessment Note    Subjective Comments:   Vertigo when roll R, for past weeks  Initial Pain Level:     0/10  Post Session Pain Level:      0/10  Medications Last Reviewed: 3/27/2025  Updated Objective Findings:  See Evaluation Note from today  Treatment   NEUROMUSCULAR RE-EDUCATION: (15 minutes):    Exercise/activities per grid below to improve balance, coordination, kinesthetic sense, posture, and proprioception.  Required minimal verbal cues to promote static and dynamic balance in standing.  Activity

## 2025-04-01 ENCOUNTER — OFFICE VISIT (OUTPATIENT)
Age: 55
End: 2025-04-01
Payer: COMMERCIAL

## 2025-04-01 VITALS
HEIGHT: 67 IN | BODY MASS INDEX: 29.98 KG/M2 | HEART RATE: 80 BPM | SYSTOLIC BLOOD PRESSURE: 114 MMHG | WEIGHT: 191 LBS | DIASTOLIC BLOOD PRESSURE: 62 MMHG

## 2025-04-01 DIAGNOSIS — E11.9 TYPE 2 DIABETES MELLITUS WITHOUT COMPLICATION, WITHOUT LONG-TERM CURRENT USE OF INSULIN: ICD-10-CM

## 2025-04-01 DIAGNOSIS — G47.33 OSA (OBSTRUCTIVE SLEEP APNEA): ICD-10-CM

## 2025-04-01 DIAGNOSIS — E78.5 DYSLIPIDEMIA: ICD-10-CM

## 2025-04-01 DIAGNOSIS — I25.10 CORONARY ARTERY DISEASE INVOLVING NATIVE CORONARY ARTERY OF NATIVE HEART WITHOUT ANGINA PECTORIS: Primary | ICD-10-CM

## 2025-04-01 PROCEDURE — 3078F DIAST BP <80 MM HG: CPT | Performed by: INTERNAL MEDICINE

## 2025-04-01 PROCEDURE — 3074F SYST BP LT 130 MM HG: CPT | Performed by: INTERNAL MEDICINE

## 2025-04-01 PROCEDURE — 99215 OFFICE O/P EST HI 40 MIN: CPT | Performed by: INTERNAL MEDICINE

## 2025-04-01 NOTE — PROGRESS NOTES
[facility] CARDIOLOGY, [address], [address]  [place] [address]  PHONE: [phone]    SUBJECTIVE: /HPI  Claudette Dominique Luu (1970) is a 54 y.o. female seen for a follow up visit regarding the following:   Specialty Problems          Cardiology Problems    Essential hypertension with goal blood pressure less than 140/90        CAD (coronary artery disease)         Pt with hx of HTN, CAD presents for acute chest pains. Last heart cath 2022 with severe mid LAD stenosis between 2 previously placed stents, diffuse small vessel CAD of apical LAD, normal LCX. Pain had started at midnight midsternal. Took 3 NTG tabs with pain relief a few weeks ago. No SOB, syncope, edema. Compliant with all medications.     Patient returns today 4 weeks after her previous visit she endorses that overall her chest pains have essentially resolved therefore I think we probably do not need to schedule a heart cath at this point    Past Medical History, Past Surgical History, Family history, Social History, and Medications were all reviewed with the patient today and updated as necessary.    Allergies   Allergen Reactions    Amlodipine Headaches    Lisinopril      Other reaction(s): Headache-Intolerance    Olmesartan      Other reaction(s): Nausea and/or vomiting-Intolerance    Paroxetine Hcl      Other reaction(s): Headache-Intolerance    Egg-Derived Products     Hydrochlorothiazide      Other reaction(s): Other- (not listed) - Allergy  Couldn't see after taking, \"wool over eyes\"    Indapamide      Other reaction(s): Unknown-Unspecified    Rosuvastatin Other (See Comments)    Semaglutide Nausea And Vomiting    Tramadol Swelling    Butorphanol Palpitations    Cephalexin Rash and Swelling    Penicillins Rash    Ticagrelor Nausea And Vomiting     Past Medical History:   Diagnosis Date    Acute inferior myocardial infarction (HCC) 07/01/2019    Asthma     seasonal    CAD (coronary artery disease)     Depression with anxiety

## 2025-05-23 ENCOUNTER — HOSPITAL ENCOUNTER (OUTPATIENT)
Dept: MRI IMAGING | Age: 55
Discharge: HOME | End: 2025-05-26
Payer: COMMERCIAL

## 2025-05-23 DIAGNOSIS — M25.561 ACUTE PAIN OF RIGHT KNEE: ICD-10-CM

## 2025-05-23 PROCEDURE — 73721 MRI JNT OF LWR EXTRE W/O DYE: CPT

## 2025-06-04 ENCOUNTER — TRANSCRIBE ORDERS (OUTPATIENT)
Dept: SCHEDULING | Age: 55
End: 2025-06-04

## 2025-06-04 DIAGNOSIS — M25.512 ACUTE PAIN OF LEFT SHOULDER: Primary | ICD-10-CM

## 2025-06-10 ENCOUNTER — HOSPITAL ENCOUNTER (OUTPATIENT)
Dept: MRI IMAGING | Age: 55
Discharge: HOME | End: 2025-06-13
Payer: COMMERCIAL

## 2025-06-10 DIAGNOSIS — M25.512 ACUTE PAIN OF LEFT SHOULDER: ICD-10-CM

## 2025-06-10 PROCEDURE — 73221 MRI JOINT UPR EXTREM W/O DYE: CPT

## 2025-07-14 RX ORDER — ALBUTEROL SULFATE 90 UG/1
INHALANT RESPIRATORY (INHALATION)
Refills: 0 | OUTPATIENT
Start: 2025-07-14

## 2025-07-31 ENCOUNTER — EVALUATION (OUTPATIENT)
Age: 55
End: 2025-07-31

## 2025-07-31 ENCOUNTER — OFFICE VISIT (OUTPATIENT)
Age: 55
End: 2025-07-31
Payer: COMMERCIAL

## 2025-07-31 DIAGNOSIS — S62.313A CLOSED DISPLACED FRACTURE OF BASE OF THIRD METACARPAL BONE OF LEFT HAND, INITIAL ENCOUNTER: ICD-10-CM

## 2025-07-31 DIAGNOSIS — M25.642 STIFFNESS OF LEFT HAND JOINT: ICD-10-CM

## 2025-07-31 DIAGNOSIS — Z78.9 DECREASED ACTIVITIES OF DAILY LIVING (ADL): ICD-10-CM

## 2025-07-31 DIAGNOSIS — M79.645 PAIN IN FINGER OF LEFT HAND: ICD-10-CM

## 2025-07-31 DIAGNOSIS — S62.341G: ICD-10-CM

## 2025-07-31 DIAGNOSIS — M62.81 MUSCULAR WEAKNESS: ICD-10-CM

## 2025-07-31 DIAGNOSIS — S62.341G: Primary | ICD-10-CM

## 2025-07-31 DIAGNOSIS — M25.60 DECREASED RANGE OF MOTION: ICD-10-CM

## 2025-07-31 DIAGNOSIS — M79.642 PAIN IN LEFT HAND: Primary | ICD-10-CM

## 2025-07-31 PROCEDURE — 99204 OFFICE O/P NEW MOD 45 MIN: CPT | Performed by: ORTHOPAEDIC SURGERY

## 2025-07-31 NOTE — PROGRESS NOTES
[facility] Note    Name: Claudette Luu  YOB: 1970  Gender: female  MRN: [id number]      CC: Patient referred for evaluation of upper extremity pain    HPI: Claudette Luu is a 54 y.o. female with a chief complaint of injury to the left hand which occurred on 7/29 when she tried to use the bathroom in the middle of the night and her  startled her. She was seen in the ED. Xrays were obtained. She was placed in a splint.      ROS/Meds/PSH/PMH/FH/SH: I personally reviewed the patients standard intake form.  Pertinents are discussed in the HPI    Physical Examination:    Musculoskeletal Exam:  Examination on the left upper extremity demonstrates cap refill < 5 seconds in all fingers, skin is intact. There is swelling, ecchymosis and tenderness at the 2nd/3rd MC bases. Finger range of motion is limited. There is no apparent rotational deformity.    Imaging / Electrodiagnostic Tests:     I independently reviewed and interpreted left hand radiographs.  They demonstrate nondisplaced 2nd metacarpal base, comminuted and mildly displaced but not angulated 3rd metacarpal base fractures      Assessment:     ICD-10-CM    1. Nondisplaced fracture of base of second metacarpal bone, left hand, subsequent encounter for fracture with delayed healing  S62.341G Ambulatory referral to Occupational Therapy      2. Closed displaced fracture of base of third metacarpal bone of left hand, initial encounter  S62.313A Ambulatory referral to Occupational Therapy          Plan:   We discussed the diagnosis and different treatment options. We discussed observation, therapy, antiinflammatory medications and other pertinent treatment modalities.    After discussing in detail the patient elects to proceed with nonsurgical treatment. Patient will be placed into a custom molded removable brace. Brace should remain in place at all times except for hygiene. The patient can perform finger range of motion as

## 2025-07-31 NOTE — PROGRESS NOTES
[facility] ORTHOPAEDICS  [address] [place] [id number]  Dept: [phone]   Occupational Therapy Initial Assessment     PERTINENT MEDICAL HISTORY   Referring MD: Friend, Thalia TOMAS MD  Diagnosis:     ICD-10-CM    1. Pain in left hand  M79.642       2. Stiffness of left hand joint  M25.642       3. Pain in finger of left hand  M79.645       4. Decreased activities of daily living (ADL)  Z78.9       5. Decreased range of motion  M25.60       6. Muscular weakness  M62.81          Surgery or Medical Dx: L IF and LF MC base fx  Date of Injury: 7/30/2025  [x] Special instructions: Motion at IPs only  Co-morbidities affecting plan of care / Therapy precautions: Fracture precautions     Preferred Name: Claudette  SUBJECTIVE   History of injury (how symptoms started) : Pt states that she tripped and fell and hit the back of her hand.   Chief Complaint/Current Symptoms: Pain, weakness, swelling, difficulty with tasks including ADLs and IADLs, difficulty sleeping, and difficulty performing job duties  Nature of condition: Recent onset (initial onset within last 3 months)  Primary cause of current episode: Traumatic  Number of Occupational Therapy Visits in past 90 days: 0    Pain Assessment:  Description: sharp, stabbing, and throbbing  Average Pain/symptom intensity (0-10 scale)  Last 24 hours: 8/10  Last week (1-7 days): 10/10  How often do you feel symptoms? Constant (% of time)  Aggravating factors: reaching, lifting, carrying, upper body dressing/grooming, lower body dressing/grooming, and sleeping   Alleviating factors: ice and avoid aggravating movements  How much have your symptoms interfered with daily activities? Extremely    Neuro screen: radiating symptoms    Social/Functional Hx:  In general, would you say your current overall health is very good  Pt lives at home with her family.    Current DME: brace/splint custom hand based brace fabricated today  Sleep: severely disturbed (less than

## 2025-08-05 ENCOUNTER — TREATMENT (OUTPATIENT)
Age: 55
End: 2025-08-05
Payer: COMMERCIAL

## 2025-08-05 DIAGNOSIS — M25.642 STIFFNESS OF LEFT HAND JOINT: ICD-10-CM

## 2025-08-05 DIAGNOSIS — M25.60 DECREASED RANGE OF MOTION: ICD-10-CM

## 2025-08-05 DIAGNOSIS — M79.642 PAIN IN LEFT HAND: Primary | ICD-10-CM

## 2025-08-05 DIAGNOSIS — Z78.9 DECREASED ACTIVITIES OF DAILY LIVING (ADL): ICD-10-CM

## 2025-08-05 DIAGNOSIS — M62.81 MUSCULAR WEAKNESS: ICD-10-CM

## 2025-08-05 DIAGNOSIS — M79.645 PAIN IN FINGER OF LEFT HAND: ICD-10-CM

## 2025-08-05 PROCEDURE — 97110 THERAPEUTIC EXERCISES: CPT | Performed by: OCCUPATIONAL THERAPIST

## 2025-08-05 PROCEDURE — 97763 ORTHC/PROSTC MGMT SBSQ ENC: CPT | Performed by: OCCUPATIONAL THERAPIST

## 2025-08-12 ENCOUNTER — TREATMENT (OUTPATIENT)
Age: 55
End: 2025-08-12
Payer: COMMERCIAL

## 2025-08-12 DIAGNOSIS — S62.313A CLOSED DISPLACED FRACTURE OF BASE OF THIRD METACARPAL BONE OF LEFT HAND, INITIAL ENCOUNTER: Primary | ICD-10-CM

## 2025-08-12 DIAGNOSIS — M25.642 STIFFNESS OF LEFT HAND JOINT: ICD-10-CM

## 2025-08-12 DIAGNOSIS — M79.642 PAIN IN LEFT HAND: Primary | ICD-10-CM

## 2025-08-12 DIAGNOSIS — M79.645 PAIN IN FINGER OF LEFT HAND: ICD-10-CM

## 2025-08-12 DIAGNOSIS — M25.60 DECREASED RANGE OF MOTION: ICD-10-CM

## 2025-08-12 DIAGNOSIS — M62.81 MUSCULAR WEAKNESS: ICD-10-CM

## 2025-08-12 DIAGNOSIS — Z78.9 DECREASED ACTIVITIES OF DAILY LIVING (ADL): ICD-10-CM

## 2025-08-12 PROCEDURE — 97110 THERAPEUTIC EXERCISES: CPT | Performed by: OCCUPATIONAL THERAPIST

## 2025-08-12 PROCEDURE — 97022 WHIRLPOOL THERAPY: CPT | Performed by: OCCUPATIONAL THERAPIST

## 2025-08-12 RX ORDER — METHYLPREDNISOLONE 4 MG/1
TABLET ORAL
Qty: 1 KIT | Refills: 0 | Status: SHIPPED | OUTPATIENT
Start: 2025-08-12

## 2025-08-14 ENCOUNTER — TREATMENT (OUTPATIENT)
Age: 55
End: 2025-08-14

## 2025-08-14 ENCOUNTER — OFFICE VISIT (OUTPATIENT)
Age: 55
End: 2025-08-14
Payer: COMMERCIAL

## 2025-08-14 DIAGNOSIS — M25.642 STIFFNESS OF LEFT HAND JOINT: ICD-10-CM

## 2025-08-14 DIAGNOSIS — S62.313A CLOSED DISPLACED FRACTURE OF BASE OF THIRD METACARPAL BONE OF LEFT HAND, INITIAL ENCOUNTER: Primary | ICD-10-CM

## 2025-08-14 DIAGNOSIS — M79.645 PAIN IN FINGER OF LEFT HAND: ICD-10-CM

## 2025-08-14 DIAGNOSIS — M25.60 DECREASED RANGE OF MOTION: ICD-10-CM

## 2025-08-14 DIAGNOSIS — S62.313A CLOSED DISPLACED FRACTURE OF BASE OF THIRD METACARPAL BONE OF LEFT HAND, INITIAL ENCOUNTER: ICD-10-CM

## 2025-08-14 DIAGNOSIS — M62.81 MUSCULAR WEAKNESS: ICD-10-CM

## 2025-08-14 DIAGNOSIS — S62.341G: ICD-10-CM

## 2025-08-14 DIAGNOSIS — M79.642 PAIN IN LEFT HAND: Primary | ICD-10-CM

## 2025-08-14 DIAGNOSIS — Z78.9 DECREASED ACTIVITIES OF DAILY LIVING (ADL): ICD-10-CM

## 2025-08-14 PROCEDURE — 99213 OFFICE O/P EST LOW 20 MIN: CPT | Performed by: ORTHOPAEDIC SURGERY

## 2025-08-21 ENCOUNTER — TREATMENT (OUTPATIENT)
Age: 55
End: 2025-08-21

## 2025-08-21 DIAGNOSIS — M25.642 STIFFNESS OF LEFT HAND JOINT: ICD-10-CM

## 2025-08-21 DIAGNOSIS — Z78.9 DECREASED ACTIVITIES OF DAILY LIVING (ADL): ICD-10-CM

## 2025-08-21 DIAGNOSIS — M62.81 MUSCULAR WEAKNESS: ICD-10-CM

## 2025-08-21 DIAGNOSIS — M79.645 PAIN IN FINGER OF LEFT HAND: ICD-10-CM

## 2025-08-21 DIAGNOSIS — M25.60 DECREASED RANGE OF MOTION: ICD-10-CM

## 2025-08-21 DIAGNOSIS — M79.642 PAIN IN LEFT HAND: Primary | ICD-10-CM

## 2025-08-26 ENCOUNTER — TREATMENT (OUTPATIENT)
Age: 55
End: 2025-08-26
Payer: COMMERCIAL

## 2025-08-26 DIAGNOSIS — M25.60 DECREASED RANGE OF MOTION: ICD-10-CM

## 2025-08-26 DIAGNOSIS — M25.642 STIFFNESS OF LEFT HAND JOINT: ICD-10-CM

## 2025-08-26 DIAGNOSIS — M79.642 PAIN IN LEFT HAND: Primary | ICD-10-CM

## 2025-08-26 DIAGNOSIS — Z78.9 DECREASED ACTIVITIES OF DAILY LIVING (ADL): ICD-10-CM

## 2025-08-26 DIAGNOSIS — M79.645 PAIN IN FINGER OF LEFT HAND: ICD-10-CM

## 2025-08-26 DIAGNOSIS — M62.81 MUSCULAR WEAKNESS: ICD-10-CM

## 2025-08-26 PROCEDURE — 97022 WHIRLPOOL THERAPY: CPT | Performed by: OCCUPATIONAL THERAPIST

## 2025-08-26 PROCEDURE — 97763 ORTHC/PROSTC MGMT SBSQ ENC: CPT | Performed by: OCCUPATIONAL THERAPIST

## 2025-08-26 PROCEDURE — 97110 THERAPEUTIC EXERCISES: CPT | Performed by: OCCUPATIONAL THERAPIST

## 2025-08-28 ENCOUNTER — TREATMENT (OUTPATIENT)
Age: 55
End: 2025-08-28

## 2025-08-28 DIAGNOSIS — Z78.9 DECREASED ACTIVITIES OF DAILY LIVING (ADL): ICD-10-CM

## 2025-08-28 DIAGNOSIS — M25.642 STIFFNESS OF LEFT HAND JOINT: ICD-10-CM

## 2025-08-28 DIAGNOSIS — M25.60 DECREASED RANGE OF MOTION: ICD-10-CM

## 2025-08-28 DIAGNOSIS — M79.642 PAIN IN LEFT HAND: Primary | ICD-10-CM

## 2025-08-28 DIAGNOSIS — M79.645 PAIN IN FINGER OF LEFT HAND: ICD-10-CM

## 2025-08-28 DIAGNOSIS — M62.81 MUSCULAR WEAKNESS: ICD-10-CM

## (undated) DEVICE — DEVICE COMPR REG 24 CM VASC BND

## (undated) DEVICE — GUIDEWIRE 035IN 210CM PTFE COAT FIX COR J TIP 15MM FIRM BODY

## (undated) DEVICE — Device: Brand: PROWATER

## (undated) DEVICE — RUNTHROUGH NS EXTRA FLOPPY PTCA GUIDEWIRE: Brand: RUNTHROUGH

## (undated) DEVICE — CATHETER ANGIO 5FR L100CM GRY S STL NYL JR4 3 SEG BRAID L

## (undated) DEVICE — RADIFOCUS OPTITORQUE ANGIOGRAPHIC CATHETER: Brand: OPTITORQUE

## (undated) DEVICE — CATHETER GUID AD 6FR L100CM COR PERIPH GRN NYL PTFE XB L 3

## (undated) DEVICE — BAND RADIAL COMPR ARTERY 24CM -- REG BX/10

## (undated) DEVICE — CATHETER GUID 6FR L100CM GRN PTFE JR4 TRUELUMEN HYBRID

## (undated) DEVICE — COPILOT BLEEDBACK CONTROL VALVE: Brand: COPILOT

## (undated) DEVICE — CATHETER GUID 6FR L100CM BLU PTFE JL3.5 TRUELUMEN HYBRID

## (undated) DEVICE — CATHETER DIAG AD 5FR L100CM COR NYL JUDKINS R 5 DILATED

## (undated) DEVICE — CATHETER DIAG AD 5FR L110CM 145DEG COR GRY HYDRPHLC NYL

## (undated) DEVICE — GLIDESHEATH SLENDER STAINLESS STEEL KIT: Brand: GLIDESHEATH SLENDER

## (undated) DEVICE — CATH BLLN ANGIO 2X15MM NC EUPHORIA RX

## (undated) DEVICE — GUIDEWIRE VASC L260CM DIA0.035IN RAD 3MM J TIP L7CM PTFE

## (undated) DEVICE — CATH ANGI BLLN DIL 2.75X08MM -- NC EUPHORA